# Patient Record
Sex: MALE | Race: OTHER | Employment: FULL TIME | URBAN - METROPOLITAN AREA
[De-identification: names, ages, dates, MRNs, and addresses within clinical notes are randomized per-mention and may not be internally consistent; named-entity substitution may affect disease eponyms.]

---

## 2017-11-05 ENCOUNTER — HOSPITAL ENCOUNTER (INPATIENT)
Age: 52
LOS: 13 days | Discharge: HOME HEALTH | DRG: 720 | End: 2017-11-18
Attending: EMERGENCY MEDICINE | Admitting: INTERNAL MEDICINE
Payer: MEDICAID

## 2017-11-05 ENCOUNTER — APPOINTMENT (OUTPATIENT)
Dept: CT IMAGING | Age: 52
DRG: 720 | End: 2017-11-05
Payer: MEDICAID

## 2017-11-05 DIAGNOSIS — E11.10 TYPE 2 DIABETES MELLITUS WITH KETOACIDOSIS WITHOUT COMA, UNSPECIFIED LONG TERM INSULIN USE STATUS: ICD-10-CM

## 2017-11-05 DIAGNOSIS — A41.9 SEPSIS, DUE TO UNSPECIFIED ORGANISM: ICD-10-CM

## 2017-11-05 DIAGNOSIS — A49.01 MSSA (METHICILLIN SUSCEPTIBLE STAPHYLOCOCCUS AUREUS) INFECTION: ICD-10-CM

## 2017-11-05 DIAGNOSIS — N10 ACUTE PYELONEPHRITIS: Primary | ICD-10-CM

## 2017-11-05 LAB
ALBUMIN SERPL-MCNC: 2.6 G/DL (ref 3.9–4.9)
ALP BLD-CCNC: 269 U/L (ref 35–104)
ALT SERPL-CCNC: 27 U/L (ref 0–41)
ANION GAP SERPL CALCULATED.3IONS-SCNC: 14 MEQ/L (ref 7–13)
ANION GAP SERPL CALCULATED.3IONS-SCNC: 16 MEQ/L (ref 7–13)
ANISOCYTOSIS: ABNORMAL
AST SERPL-CCNC: 14 U/L (ref 0–40)
BACTERIA: ABNORMAL /HPF
BANDED NEUTROPHILS RELATIVE PERCENT: 16 %
BASOPHILS ABSOLUTE: 0.2 K/UL (ref 0–0.2)
BASOPHILS RELATIVE PERCENT: 1 %
BETA-HYDROXYBUTYRATE: 6.8 MG/DL (ref 0.2–2.8)
BILIRUB SERPL-MCNC: 0.9 MG/DL (ref 0–1.2)
BILIRUBIN URINE: NEGATIVE
BLOOD, URINE: ABNORMAL
BUN BLDV-MCNC: 19 MG/DL (ref 6–20)
BUN BLDV-MCNC: 22 MG/DL (ref 6–20)
CALCIUM SERPL-MCNC: 7.6 MG/DL (ref 8.6–10.2)
CALCIUM SERPL-MCNC: 8 MG/DL (ref 8.6–10.2)
CHLORIDE BLD-SCNC: 85 MEQ/L (ref 98–107)
CHLORIDE BLD-SCNC: 93 MEQ/L (ref 98–107)
CHP ED QC CHECK: YES
CLARITY: ABNORMAL
CO2: 25 MEQ/L (ref 22–29)
CO2: 26 MEQ/L (ref 22–29)
COLOR: ABNORMAL
CREAT SERPL-MCNC: 1.12 MG/DL (ref 0.7–1.2)
CREAT SERPL-MCNC: 1.24 MG/DL (ref 0.7–1.2)
EOSINOPHILS ABSOLUTE: 0 K/UL (ref 0–0.7)
EOSINOPHILS RELATIVE PERCENT: 0.2 %
EPITHELIAL CELLS, UA: ABNORMAL /HPF
GFR AFRICAN AMERICAN: >60
GFR AFRICAN AMERICAN: >60
GFR NON-AFRICAN AMERICAN: >60
GFR NON-AFRICAN AMERICAN: >60
GLOBULIN: 3.7 G/DL (ref 2.3–3.5)
GLUCOSE BLD-MCNC: 163 MG/DL (ref 60–115)
GLUCOSE BLD-MCNC: 163 MG/DL (ref 74–109)
GLUCOSE BLD-MCNC: 169 MG/DL (ref 60–115)
GLUCOSE BLD-MCNC: 186 MG/DL (ref 60–115)
GLUCOSE BLD-MCNC: 190 MG/DL (ref 60–115)
GLUCOSE BLD-MCNC: 208 MG/DL (ref 60–115)
GLUCOSE BLD-MCNC: 361 MG/DL (ref 60–115)
GLUCOSE BLD-MCNC: 380 MG/DL
GLUCOSE BLD-MCNC: 380 MG/DL (ref 60–115)
GLUCOSE BLD-MCNC: 522 MG/DL (ref 74–109)
GLUCOSE URINE: >=1000 MG/DL
HBA1C MFR BLD: 11.9 % (ref 4.8–5.9)
HCT VFR BLD CALC: 33.4 % (ref 42–52)
HEMOGLOBIN: 11.5 G/DL (ref 14–18)
HYPOCHROMIA: 0
KETONES, URINE: ABNORMAL MG/DL
LACTIC ACID: 1.5 MMOL/L (ref 0.5–2.2)
LACTIC ACID: 2.9 MMOL/L (ref 0.5–2.2)
LEUKOCYTE ESTERASE, URINE: ABNORMAL
LYMPHOCYTES ABSOLUTE: 0.5 K/UL (ref 1–4.8)
LYMPHOCYTES RELATIVE PERCENT: 2 %
MACROCYTES: 0
MAGNESIUM: 2.2 MG/DL (ref 1.7–2.3)
MCH RBC QN AUTO: 28.3 PG (ref 27–31.3)
MCHC RBC AUTO-ENTMCNC: 34.4 % (ref 33–37)
MCV RBC AUTO: 82.3 FL (ref 80–100)
METAMYELOCYTES RELATIVE PERCENT: 1 %
MICROCYTES: 0
MONOCYTES ABSOLUTE: 0.7 K/UL (ref 0.2–0.8)
MONOCYTES RELATIVE PERCENT: 2.6 %
NEUTROPHILS ABSOLUTE: 21.7 K/UL (ref 1.4–6.5)
NEUTROPHILS RELATIVE PERCENT: 78 %
NITRITE, URINE: POSITIVE
OSMOLALITY: 283 MOSM/KG (ref 280–303)
PDW BLD-RTO: 12.4 % (ref 11.5–14.5)
PERFORMED ON: ABNORMAL
PH UA: 6 (ref 5–9)
PHOSPHORUS: 2.6 MG/DL (ref 2.5–4.5)
PLATELET # BLD: 271 K/UL (ref 130–400)
PLATELET SLIDE REVIEW: ADEQUATE
POIKILOCYTES: 0
POLYCHROMASIA: 0
POTASSIUM SERPL-SCNC: 3.8 MEQ/L (ref 3.5–5.1)
POTASSIUM SERPL-SCNC: 4.8 MEQ/L (ref 3.5–5.1)
PROTEIN UA: 30 MG/DL
RBC # BLD: 4.06 M/UL (ref 4.7–6.1)
RBC UA: ABNORMAL /HPF (ref 0–2)
SLIDE REVIEW: ABNORMAL
SMUDGE CELLS: 1.8
SODIUM BLD-SCNC: 125 MEQ/L (ref 132–144)
SODIUM BLD-SCNC: 134 MEQ/L (ref 132–144)
SPECIFIC GRAVITY UA: 1.03 (ref 1–1.03)
TOTAL PROTEIN: 6.3 G/DL (ref 6.4–8.1)
URINE REFLEX TO CULTURE: YES
UROBILINOGEN, URINE: 0.2 E.U./DL
VACUOLATED NEUTROPHILS: ABNORMAL
WBC # BLD: 22.8 K/UL (ref 4.8–10.8)
WBC UA: >100 /HPF (ref 0–5)

## 2017-11-05 PROCEDURE — 6370000000 HC RX 637 (ALT 250 FOR IP): Performed by: INTERNAL MEDICINE

## 2017-11-05 PROCEDURE — 36415 COLL VENOUS BLD VENIPUNCTURE: CPT

## 2017-11-05 PROCEDURE — 74176 CT ABD & PELVIS W/O CONTRAST: CPT

## 2017-11-05 PROCEDURE — 83036 HEMOGLOBIN GLYCOSYLATED A1C: CPT

## 2017-11-05 PROCEDURE — 83605 ASSAY OF LACTIC ACID: CPT

## 2017-11-05 PROCEDURE — 82010 KETONE BODYS QUAN: CPT

## 2017-11-05 PROCEDURE — 2580000003 HC RX 258: Performed by: EMERGENCY MEDICINE

## 2017-11-05 PROCEDURE — 87149 DNA/RNA DIRECT PROBE: CPT

## 2017-11-05 PROCEDURE — 87147 CULTURE TYPE IMMUNOLOGIC: CPT

## 2017-11-05 PROCEDURE — 2000000000 HC ICU R&B

## 2017-11-05 PROCEDURE — 87186 SC STD MICRODIL/AGAR DIL: CPT

## 2017-11-05 PROCEDURE — 6360000002 HC RX W HCPCS: Performed by: INTERNAL MEDICINE

## 2017-11-05 PROCEDURE — 81001 URINALYSIS AUTO W/SCOPE: CPT

## 2017-11-05 PROCEDURE — 2580000003 HC RX 258: Performed by: INTERNAL MEDICINE

## 2017-11-05 PROCEDURE — 6370000000 HC RX 637 (ALT 250 FOR IP): Performed by: EMERGENCY MEDICINE

## 2017-11-05 PROCEDURE — 96375 TX/PRO/DX INJ NEW DRUG ADDON: CPT

## 2017-11-05 PROCEDURE — 87040 BLOOD CULTURE FOR BACTERIA: CPT

## 2017-11-05 PROCEDURE — 87077 CULTURE AEROBIC IDENTIFY: CPT

## 2017-11-05 PROCEDURE — 6360000002 HC RX W HCPCS: Performed by: EMERGENCY MEDICINE

## 2017-11-05 PROCEDURE — 83930 ASSAY OF BLOOD OSMOLALITY: CPT

## 2017-11-05 PROCEDURE — 96365 THER/PROPH/DIAG IV INF INIT: CPT

## 2017-11-05 PROCEDURE — 83735 ASSAY OF MAGNESIUM: CPT

## 2017-11-05 PROCEDURE — 96366 THER/PROPH/DIAG IV INF ADDON: CPT

## 2017-11-05 PROCEDURE — 99284 EMERGENCY DEPT VISIT MOD MDM: CPT

## 2017-11-05 PROCEDURE — 85025 COMPLETE CBC W/AUTO DIFF WBC: CPT

## 2017-11-05 PROCEDURE — 84100 ASSAY OF PHOSPHORUS: CPT

## 2017-11-05 PROCEDURE — 87086 URINE CULTURE/COLONY COUNT: CPT

## 2017-11-05 PROCEDURE — 80053 COMPREHEN METABOLIC PANEL: CPT

## 2017-11-05 RX ORDER — CIPROFLOXACIN 2 MG/ML
400 INJECTION, SOLUTION INTRAVENOUS ONCE
Status: COMPLETED | OUTPATIENT
Start: 2017-11-05 | End: 2017-11-05

## 2017-11-05 RX ORDER — INSULIN GLARGINE 100 [IU]/ML
50 INJECTION, SOLUTION SUBCUTANEOUS NIGHTLY
Status: DISCONTINUED | OUTPATIENT
Start: 2017-11-05 | End: 2017-11-07

## 2017-11-05 RX ORDER — DEXTROSE AND SODIUM CHLORIDE 5; .45 G/100ML; G/100ML
INJECTION, SOLUTION INTRAVENOUS CONTINUOUS PRN
Status: DISCONTINUED | OUTPATIENT
Start: 2017-11-05 | End: 2017-11-18 | Stop reason: HOSPADM

## 2017-11-05 RX ORDER — DEXTROSE MONOHYDRATE 25 G/50ML
12.5 INJECTION, SOLUTION INTRAVENOUS PRN
Status: DISCONTINUED | OUTPATIENT
Start: 2017-11-05 | End: 2017-11-05 | Stop reason: SDUPTHER

## 2017-11-05 RX ORDER — SODIUM CHLORIDE 450 MG/100ML
INJECTION, SOLUTION INTRAVENOUS CONTINUOUS
Status: DISCONTINUED | OUTPATIENT
Start: 2017-11-05 | End: 2017-11-06

## 2017-11-05 RX ORDER — 0.9 % SODIUM CHLORIDE 0.9 %
1000 INTRAVENOUS SOLUTION INTRAVENOUS ONCE
Status: COMPLETED | OUTPATIENT
Start: 2017-11-05 | End: 2017-11-05

## 2017-11-05 RX ORDER — DEXTROSE MONOHYDRATE 50 MG/ML
100 INJECTION, SOLUTION INTRAVENOUS PRN
Status: DISCONTINUED | OUTPATIENT
Start: 2017-11-05 | End: 2017-11-18 | Stop reason: HOSPADM

## 2017-11-05 RX ORDER — ACETAMINOPHEN 500 MG
1000 TABLET ORAL ONCE
Status: COMPLETED | OUTPATIENT
Start: 2017-11-05 | End: 2017-11-05

## 2017-11-05 RX ORDER — NICOTINE POLACRILEX 4 MG
15 LOZENGE BUCCAL PRN
Status: DISCONTINUED | OUTPATIENT
Start: 2017-11-05 | End: 2017-11-18 | Stop reason: HOSPADM

## 2017-11-05 RX ORDER — DEXTROSE MONOHYDRATE 25 G/50ML
12.5 INJECTION, SOLUTION INTRAVENOUS PRN
Status: DISCONTINUED | OUTPATIENT
Start: 2017-11-05 | End: 2017-11-18 | Stop reason: HOSPADM

## 2017-11-05 RX ORDER — TAMSULOSIN HYDROCHLORIDE 0.4 MG/1
0.4 CAPSULE ORAL DAILY
Status: DISCONTINUED | OUTPATIENT
Start: 2017-11-05 | End: 2017-11-18 | Stop reason: HOSPADM

## 2017-11-05 RX ADMIN — SODIUM CHLORIDE 9 UNITS/HR: 9 INJECTION, SOLUTION INTRAVENOUS at 16:40

## 2017-11-05 RX ADMIN — SODIUM CHLORIDE 4 UNITS/HR: 9 INJECTION, SOLUTION INTRAVENOUS at 14:43

## 2017-11-05 RX ADMIN — TAZOBACTAM SODIUM AND PIPERACILLIN SODIUM 3.38 G: 375; 3 INJECTION, SOLUTION INTRAVENOUS at 14:27

## 2017-11-05 RX ADMIN — CIPROFLOXACIN 400 MG: 2 INJECTION, SOLUTION INTRAVENOUS at 12:50

## 2017-11-05 RX ADMIN — SODIUM CHLORIDE 9 UNITS/HR: 9 INJECTION, SOLUTION INTRAVENOUS at 16:11

## 2017-11-05 RX ADMIN — TAZOBACTAM SODIUM AND PIPERACILLIN SODIUM 3.38 G: 375; 3 INJECTION, SOLUTION INTRAVENOUS at 23:44

## 2017-11-05 RX ADMIN — INSULIN HUMAN 10 UNITS: 100 INJECTION, SOLUTION PARENTERAL at 13:19

## 2017-11-05 RX ADMIN — SODIUM CHLORIDE 1000 ML: 9 INJECTION, SOLUTION INTRAVENOUS at 13:18

## 2017-11-05 RX ADMIN — DEXTROSE AND SODIUM CHLORIDE: 5; 450 INJECTION, SOLUTION INTRAVENOUS at 18:24

## 2017-11-05 RX ADMIN — SODIUM CHLORIDE: 4.5 INJECTION, SOLUTION INTRAVENOUS at 16:40

## 2017-11-05 RX ADMIN — TAMSULOSIN HYDROCHLORIDE 0.4 MG: 0.4 CAPSULE ORAL at 16:40

## 2017-11-05 RX ADMIN — SODIUM CHLORIDE 1000 ML: 9 INJECTION, SOLUTION INTRAVENOUS at 12:50

## 2017-11-05 RX ADMIN — ACETAMINOPHEN 1000 MG: 500 TABLET ORAL at 11:34

## 2017-11-05 ASSESSMENT — PAIN SCALES - GENERAL
PAINLEVEL_OUTOF10: 0
PAINLEVEL_OUTOF10: 4
PAINLEVEL_OUTOF10: 7

## 2017-11-05 ASSESSMENT — ENCOUNTER SYMPTOMS
ABDOMINAL PAIN: 0
SHORTNESS OF BREATH: 0
SORE THROAT: 0
CHEST TIGHTNESS: 0
NAUSEA: 0
EYE PAIN: 0
VOMITING: 0

## 2017-11-05 ASSESSMENT — PAIN DESCRIPTION - ORIENTATION: ORIENTATION: LEFT

## 2017-11-05 ASSESSMENT — PAIN DESCRIPTION - FREQUENCY
FREQUENCY: INTERMITTENT
FREQUENCY: CONTINUOUS

## 2017-11-05 ASSESSMENT — PAIN DESCRIPTION - LOCATION: LOCATION: NECK

## 2017-11-05 ASSESSMENT — PAIN DESCRIPTION - DESCRIPTORS
DESCRIPTORS: ACHING
DESCRIPTORS: ACHING;THROBBING

## 2017-11-05 ASSESSMENT — PAIN DESCRIPTION - PAIN TYPE
TYPE: ACUTE PAIN
TYPE: ACUTE PAIN

## 2017-11-05 NOTE — ED PROVIDER NOTES
deficit. Skin: Skin is warm and dry. No rash noted. He is not diaphoretic. Psychiatric: He has a normal mood and affect. His behavior is normal.       DIAGNOSTIC RESULTS     EKG: All EKG's are interpreted by the Emergency Department Physician who either signs or Co-signs this chart in the absence of a cardiologist.        RADIOLOGY:   Non-plain film images such as CT, Ultrasound and MRI are read by the radiologist. Plain radiographic images are visualized and preliminarily interpreted by the emergency physician with the below findings:    CT of the abdomen and pelvis shows swelling of the right kidney with perinephric stranding but no hydronephrosis. There was questionable calcification at the right UVJ/bladder. Interpretation per the Radiologist below, if available at the time of this note:    CT ABDOMEN PELVIS WO IV CONTRAST Additional Contrast? None    (Results Pending)         ED BEDSIDE ULTRASOUND:   Performed by ED Physician - none    LABS:  Labs Reviewed   URINE RT REFLEX TO CULTURE       All other labs were within normal range or not returned as of this dictation. EMERGENCY DEPARTMENT COURSE and DIFFERENTIAL DIAGNOSIS/MDM:   Vitals:    Vitals:    11/05/17 1052   BP: 104/71   Pulse: 123   Resp: 14   Temp: 100.2 °F (37.9 °C)   TempSrc: Oral   SpO2: 95%   Weight: 158 lb (71.7 kg)   Height: 5' 6\" (1.676 m)       Patient presents with hematuria and fever. He was found to have pyelonephritis. CT of the abdomen and pelvis shows pyelonephritis but does not show hydronephrosis or a stone. The patient is septic and also is in DKA. Patient was diagnosed with type 2 diabetes a year ago but never had any treatment he does have a family history of diabetes. Patient was started on IV Cipro and then stepped up to IV Zosyn. He is getting IV fluids and IV insulin. I did discuss this case with urology, Dr. Stephen Cole.   He looked at the CAT scan himself and did not feel that there was a stone obstructing the ureter. He did state that the patient started to get worse he would come in and put a stent in if necessary. Patient's blood pressure is holding steady and patient is feeling much better with the IV fluids.     [facility]    Labs Reviewed   URINE RT REFLEX TO CULTURE - Abnormal; Notable for the following:        Result Value    Color, UA PRITI (*)     Clarity, UA TURBID (*)     Glucose, Ur >=1000 (*)     Ketones, Urine TRACE (*)     Blood, Urine LARGE (*)     Protein, UA 30 (*)     Nitrite, Urine POSITIVE (*)     Leukocyte Esterase, Urine LARGE (*)     All other components within normal limits   MICROSCOPIC URINALYSIS - Abnormal; Notable for the following:     WBC, UA >100 (*)     RBC, UA 20-50 (*)     All other components within normal limits   CBC WITH AUTO DIFFERENTIAL - Abnormal; Notable for the following:     WBC 22.8 (*)     RBC 4.06 (*)     Hemoglobin 11.5 (*)     Hematocrit 33.4 (*)     Neutrophils # 21.7 (*)     Lymphocytes # 0.5 (*)     All other components within normal limits   COMPREHENSIVE METABOLIC PANEL - Abnormal; Notable for the following:     Sodium 125 (*)     Chloride 85 (*)     Anion Gap 14 (*)     Glucose 522 (*)     BUN 22 (*)     CREATININE 1.24 (*)     Calcium 8.0 (*)     Total Protein 6.3 (*)     Alb 2.6 (*)     Alkaline Phosphatase 269 (*)     Globulin 3.7 (*)     All other components within normal limits    Narrative:     CALL  [facility]  [facility] tel. [phone],  Glucose results called to and read back by Dr Santhosh Lance, 11/05/2017 12:57, by  Lisa Palmer - Abnormal; Notable for the following:     Beta-Hydroxybutyrate 6.8 (*)     All other components within normal limits    Narrative:     CALL  [facility]  [facility] tel. [phone],  Glucose results called to and read back by Dr Santhosh Lance, 11/05/2017 12:57, by  Vito Mlies #2   CULTURE BLOOD #1   LACTIC ACID, PLASMA   HEMOGLOBIN A1C   POCT GLUCOSE   POCT GLUCOSE   POCT GLUCOSE       REASSESSMENT     ED Course

## 2017-11-05 NOTE — PROGRESS NOTES
NEED/INDICATION FOR EMERGENT RIGHT DOUBLE J STENT AT THIS TIME  I WILL MONITOR THE PATIENT THROUGH THE DAY  AGREE WITH ZOSYN TO BE STARTED ASAP  NO ANTICOAGULATION  Shira Unger MD

## 2017-11-05 NOTE — PROGRESS NOTES
Patient admitted to ICU bed 6. Admission assessment completed. Dr Holmes Hammans at bedside orders received. 1800 Dr Alejandro Simmons called and orders received.  Patient voices no needs at this time

## 2017-11-06 LAB
ANION GAP SERPL CALCULATED.3IONS-SCNC: 11 MEQ/L (ref 7–13)
ANION GAP SERPL CALCULATED.3IONS-SCNC: 12 MEQ/L (ref 7–13)
BASOPHILS ABSOLUTE: 0 K/UL (ref 0–0.2)
BASOPHILS RELATIVE PERCENT: 0.1 %
BUN BLDV-MCNC: 16 MG/DL (ref 6–20)
BUN BLDV-MCNC: 17 MG/DL (ref 6–20)
CALCIUM SERPL-MCNC: 7.1 MG/DL (ref 8.6–10.2)
CALCIUM SERPL-MCNC: 7.3 MG/DL (ref 8.6–10.2)
CHLORIDE BLD-SCNC: 96 MEQ/L (ref 98–107)
CHLORIDE BLD-SCNC: 96 MEQ/L (ref 98–107)
CHOLESTEROL, TOTAL: 49 MG/DL (ref 0–199)
CO2: 25 MEQ/L (ref 22–29)
CO2: 25 MEQ/L (ref 22–29)
CREAT SERPL-MCNC: 0.99 MG/DL (ref 0.7–1.2)
CREAT SERPL-MCNC: 1.12 MG/DL (ref 0.7–1.2)
EKG ATRIAL RATE: 108 BPM
EKG P AXIS: 33 DEGREES
EKG P-R INTERVAL: 138 MS
EKG Q-T INTERVAL: 376 MS
EKG QRS DURATION: 88 MS
EKG QTC CALCULATION (BAZETT): 503 MS
EKG R AXIS: 34 DEGREES
EKG T AXIS: 40 DEGREES
EKG VENTRICULAR RATE: 108 BPM
EOSINOPHILS ABSOLUTE: 0.1 K/UL (ref 0–0.7)
EOSINOPHILS RELATIVE PERCENT: 0.4 %
GFR AFRICAN AMERICAN: >60
GFR AFRICAN AMERICAN: >60
GFR NON-AFRICAN AMERICAN: >60
GFR NON-AFRICAN AMERICAN: >60
GLUCOSE BLD-MCNC: 159 MG/DL (ref 74–109)
GLUCOSE BLD-MCNC: 163 MG/DL (ref 60–115)
GLUCOSE BLD-MCNC: 163 MG/DL (ref 60–115)
GLUCOSE BLD-MCNC: 165 MG/DL (ref 74–109)
GLUCOSE BLD-MCNC: 166 MG/DL (ref 60–115)
GLUCOSE BLD-MCNC: 170 MG/DL (ref 60–115)
GLUCOSE BLD-MCNC: 177 MG/DL (ref 60–115)
GLUCOSE BLD-MCNC: 195 MG/DL (ref 60–115)
GLUCOSE BLD-MCNC: 203 MG/DL (ref 60–115)
GLUCOSE BLD-MCNC: 255 MG/DL (ref 60–115)
GLUCOSE BLD-MCNC: 308 MG/DL (ref 60–115)
HCT VFR BLD CALC: 32.8 % (ref 42–52)
HDLC SERPL-MCNC: 6 MG/DL (ref 40–59)
HEMOGLOBIN: 11 G/DL (ref 14–18)
LDL CHOLESTEROL CALCULATED: 25 MG/DL (ref 0–129)
LYMPHOCYTES ABSOLUTE: 0.7 K/UL (ref 1–4.8)
LYMPHOCYTES RELATIVE PERCENT: 3.8 %
MAGNESIUM: 2 MG/DL (ref 1.7–2.3)
MAGNESIUM: 2.3 MG/DL (ref 1.7–2.3)
MCH RBC QN AUTO: 28.2 PG (ref 27–31.3)
MCHC RBC AUTO-ENTMCNC: 33.6 % (ref 33–37)
MCV RBC AUTO: 83.8 FL (ref 80–100)
MONOCYTES ABSOLUTE: 1.2 K/UL (ref 0.2–0.8)
MONOCYTES RELATIVE PERCENT: 6.4 %
NEUTROPHILS ABSOLUTE: 17.1 K/UL (ref 1.4–6.5)
NEUTROPHILS RELATIVE PERCENT: 89.3 %
PDW BLD-RTO: 12.8 % (ref 11.5–14.5)
PERFORMED ON: ABNORMAL
PHOSPHORUS: 2.2 MG/DL (ref 2.5–4.5)
PHOSPHORUS: 3.3 MG/DL (ref 2.5–4.5)
PLATELET # BLD: 230 K/UL (ref 130–400)
POTASSIUM SERPL-SCNC: 3.5 MEQ/L (ref 3.5–5.1)
POTASSIUM SERPL-SCNC: 4.5 MEQ/L (ref 3.5–5.1)
RBC # BLD: 3.92 M/UL (ref 4.7–6.1)
SODIUM BLD-SCNC: 132 MEQ/L (ref 132–144)
SODIUM BLD-SCNC: 133 MEQ/L (ref 132–144)
TRIGL SERPL-MCNC: 91 MG/DL (ref 0–200)
VANCOMYCIN TROUGH: <4 UG/ML (ref 5–10)
WBC # BLD: 19.1 K/UL (ref 4.8–10.8)

## 2017-11-06 PROCEDURE — 99222 1ST HOSP IP/OBS MODERATE 55: CPT | Performed by: INTERNAL MEDICINE

## 2017-11-06 PROCEDURE — 6370000000 HC RX 637 (ALT 250 FOR IP): Performed by: INTERNAL MEDICINE

## 2017-11-06 PROCEDURE — 83735 ASSAY OF MAGNESIUM: CPT

## 2017-11-06 PROCEDURE — 85025 COMPLETE CBC W/AUTO DIFF WBC: CPT

## 2017-11-06 PROCEDURE — 2500000003 HC RX 250 WO HCPCS: Performed by: INTERNAL MEDICINE

## 2017-11-06 PROCEDURE — 80202 ASSAY OF VANCOMYCIN: CPT

## 2017-11-06 PROCEDURE — 2580000003 HC RX 258: Performed by: INTERNAL MEDICINE

## 2017-11-06 PROCEDURE — 99221 1ST HOSP IP/OBS SF/LOW 40: CPT | Performed by: UROLOGY

## 2017-11-06 PROCEDURE — 99254 IP/OBS CNSLTJ NEW/EST MOD 60: CPT | Performed by: INTERNAL MEDICINE

## 2017-11-06 PROCEDURE — 93005 ELECTROCARDIOGRAM TRACING: CPT

## 2017-11-06 PROCEDURE — 6360000002 HC RX W HCPCS: Performed by: INTERNAL MEDICINE

## 2017-11-06 PROCEDURE — 80048 BASIC METABOLIC PNL TOTAL CA: CPT

## 2017-11-06 PROCEDURE — 93010 ELECTROCARDIOGRAM REPORT: CPT | Performed by: INTERNAL MEDICINE

## 2017-11-06 PROCEDURE — 84100 ASSAY OF PHOSPHORUS: CPT

## 2017-11-06 PROCEDURE — 99232 SBSQ HOSP IP/OBS MODERATE 35: CPT | Performed by: ANESTHESIOLOGY

## 2017-11-06 PROCEDURE — 36415 COLL VENOUS BLD VENIPUNCTURE: CPT

## 2017-11-06 PROCEDURE — 80061 LIPID PANEL: CPT

## 2017-11-06 PROCEDURE — 2060000000 HC ICU INTERMEDIATE R&B

## 2017-11-06 RX ORDER — CALCIUM CARBONATE 200(500)MG
1000 TABLET,CHEWABLE ORAL 3 TIMES DAILY PRN
Status: DISCONTINUED | OUTPATIENT
Start: 2017-11-06 | End: 2017-11-18 | Stop reason: HOSPADM

## 2017-11-06 RX ORDER — KETOROLAC TROMETHAMINE 30 MG/ML
30 INJECTION, SOLUTION INTRAMUSCULAR; INTRAVENOUS ONCE
Status: COMPLETED | OUTPATIENT
Start: 2017-11-07 | End: 2017-11-07

## 2017-11-06 RX ORDER — ACETAMINOPHEN 325 MG/1
650 TABLET ORAL EVERY 4 HOURS PRN
Status: DISCONTINUED | OUTPATIENT
Start: 2017-11-06 | End: 2017-11-18 | Stop reason: HOSPADM

## 2017-11-06 RX ORDER — SODIUM CHLORIDE 9 MG/ML
INJECTION, SOLUTION INTRAVENOUS CONTINUOUS
Status: DISCONTINUED | OUTPATIENT
Start: 2017-11-06 | End: 2017-11-13

## 2017-11-06 RX ORDER — VANCOMYCIN HYDROCHLORIDE 1 G/200ML
1000 INJECTION, SOLUTION INTRAVENOUS EVERY 12 HOURS
Status: DISCONTINUED | OUTPATIENT
Start: 2017-11-06 | End: 2017-11-06

## 2017-11-06 RX ADMIN — SODIUM CHLORIDE: 9 INJECTION, SOLUTION INTRAVENOUS at 21:31

## 2017-11-06 RX ADMIN — ACETAMINOPHEN 650 MG: 325 TABLET ORAL at 15:05

## 2017-11-06 RX ADMIN — DEXTROSE AND SODIUM CHLORIDE: 5; 450 INJECTION, SOLUTION INTRAVENOUS at 00:36

## 2017-11-06 RX ADMIN — INSULIN LISPRO 15 UNITS: 100 INJECTION, SOLUTION INTRAVENOUS; SUBCUTANEOUS at 07:34

## 2017-11-06 RX ADMIN — DEXTROSE AND SODIUM CHLORIDE: 5; 450 INJECTION, SOLUTION INTRAVENOUS at 07:28

## 2017-11-06 RX ADMIN — VANCOMYCIN HYDROCHLORIDE 1000 MG: 1 INJECTION, SOLUTION INTRAVENOUS at 09:36

## 2017-11-06 RX ADMIN — NAFCILLIN SODIUM 2 G: 2 INJECTION, POWDER, LYOPHILIZED, FOR SOLUTION INTRAMUSCULAR; INTRAVENOUS at 20:02

## 2017-11-06 RX ADMIN — TAMSULOSIN HYDROCHLORIDE 0.4 MG: 0.4 CAPSULE ORAL at 08:19

## 2017-11-06 RX ADMIN — INSULIN LISPRO 15 UNITS: 100 INJECTION, SOLUTION INTRAVENOUS; SUBCUTANEOUS at 11:47

## 2017-11-06 RX ADMIN — ANTACID TABLETS 1000 MG: 500 TABLET, CHEWABLE ORAL at 21:37

## 2017-11-06 RX ADMIN — HYDROMORPHONE HYDROCHLORIDE 1 MG: 1 INJECTION, SOLUTION INTRAMUSCULAR; INTRAVENOUS; SUBCUTANEOUS at 00:36

## 2017-11-06 RX ADMIN — TAZOBACTAM SODIUM AND PIPERACILLIN SODIUM 3.38 G: 375; 3 INJECTION, SOLUTION INTRAVENOUS at 08:19

## 2017-11-06 RX ADMIN — ACETAMINOPHEN 650 MG: 325 TABLET ORAL at 09:36

## 2017-11-06 RX ADMIN — INSULIN GLARGINE 50 UNITS: 100 INJECTION, SOLUTION SUBCUTANEOUS at 22:07

## 2017-11-06 RX ADMIN — SODIUM CHLORIDE: 4.5 INJECTION, SOLUTION INTRAVENOUS at 15:07

## 2017-11-06 ASSESSMENT — PAIN DESCRIPTION - FREQUENCY
FREQUENCY: INTERMITTENT
FREQUENCY: INTERMITTENT

## 2017-11-06 ASSESSMENT — PAIN SCALES - GENERAL
PAINLEVEL_OUTOF10: 0
PAINLEVEL_OUTOF10: 5
PAINLEVEL_OUTOF10: 2
PAINLEVEL_OUTOF10: 8
PAINLEVEL_OUTOF10: 5
PAINLEVEL_OUTOF10: 3
PAINLEVEL_OUTOF10: 5
PAINLEVEL_OUTOF10: 6
PAINLEVEL_OUTOF10: 0

## 2017-11-06 ASSESSMENT — PAIN DESCRIPTION - PROGRESSION
CLINICAL_PROGRESSION: GRADUALLY IMPROVING
CLINICAL_PROGRESSION: GRADUALLY WORSENING
CLINICAL_PROGRESSION: GRADUALLY IMPROVING
CLINICAL_PROGRESSION: GRADUALLY WORSENING

## 2017-11-06 ASSESSMENT — PAIN DESCRIPTION - DESCRIPTORS
DESCRIPTORS: ACHING;THROBBING
DESCRIPTORS: ACHING

## 2017-11-06 ASSESSMENT — PAIN DESCRIPTION - LOCATION
LOCATION: BACK;NECK
LOCATION: NECK

## 2017-11-06 ASSESSMENT — PAIN DESCRIPTION - ONSET
ONSET: GRADUAL
ONSET: PROGRESSIVE

## 2017-11-06 ASSESSMENT — ENCOUNTER SYMPTOMS
RESPIRATORY NEGATIVE: 1
EYES NEGATIVE: 1
GASTROINTESTINAL NEGATIVE: 1
BACK PAIN: 1

## 2017-11-06 ASSESSMENT — PAIN DESCRIPTION - ORIENTATION: ORIENTATION: LEFT

## 2017-11-06 ASSESSMENT — PAIN DESCRIPTION - PAIN TYPE
TYPE: ACUTE PAIN
TYPE: ACUTE PAIN

## 2017-11-06 NOTE — CONSULTS
Height:         Constitutional: patient is oriented to person, place, and time. patient appears well-developed. patient not in distress. Ears: Adequate hearing  Head: Normocephalic. Atraumatic  Neck: Normal range of motion. No lymphadenopathy  Cardiovascular: Normal rate, BP reviewed. sinus rhythm blood pressure normal no evidence of hypotension patient is afebrile  Pulmonary/Chest: Normal respiratory effort  no shortness of breath  Abdominal: No evidence of abdominal discomfort  Urologic Exam  The patient currently does not have a Quijano catheter  CT scan reviewed. Patient currently on Flomax  Prostate exam deferred. Musculoskeletal:  Ambulatory. Extremities: No edema   Neurological: Cranial nerves intact  Skin: Skin is warm and dry. no rashes  Lymphatics: No lymphadenopathy   Psychiatric: patient has a normal mood and affect. patient's behavior is normal.  Patient fully awake standing talking freely and appropriate in conversation. Assessment  Question of distal right ureteral calculus  No evidence of renal colic at this time  If a stone was there probably it has passed  Plan  We'll continue to monitor patient  No plan on placing double J stent at this time  Greater 50% of 50 minutes spent evaluating patient face to face. Joana Quiroga MD FACS    Please note this report has been partially produced using speech recognition software  And may cause contain errors related to that system including grammar, punctuation and spelling as well as words and phrases that may seem inappropriate. If there are questions or concerns please feel free to contact me to clarify.

## 2017-11-06 NOTE — H&P
patient's allergies indicates no known allergies. Social History:   TOBACCO:   reports that he has never smoked. He has never used smokeless tobacco.  ETOH:   has no alcohol history on file. Lives at home with wife  No travel      Family History:   No family history on file. REVIEW OF SYSTEMS:  Ten systems reviewed and negative except for as above. Physical Exam:    Vitals: /73   Pulse 105   Temp 98.4 °F (36.9 °C)   Resp 21   Ht 5' 6\" (1.676 m)   Wt 158 lb (71.7 kg)   SpO2 96%   BMI 25.50 kg/m²   Constitutional: alert, appears stated age and cooperative  Skin: Skin color, texture, turgor normal. No rashes or lesions  Eyes:Eye: Normal external eye, conjunctiva, PETER. ENT: Head: Normocephalic, no lesions, without obvious abnormality. Neck: no adenopathy, no carotid bruit, no JVD, supple, symmetrical, trachea midline and thyroid not enlarged, symmetric, no tenderness/mass/nodules  Respiratory: clear to auscultation bilaterally  Cardiovascular: regular rate and rhythm, S1, S2 normal, no murmur, click, rub or gallop  Gastrointestinal: soft, non-tender; bowel sounds normal; no masses,  no organomegaly  Genitourinary: Deferred  Musculoskeletal:extremities normal, atraumatic, no cyanosis or edema  Neurologic: Mental status AAOx3 No facial asymmetry or droop. Normal muscle strength b/l. Psychiatric: Appropriate mood and affect.  Good insight and judgement  Hematologic: No obvious bruising or bleeding    Recent Labs      11/05/17   1200   WBC  22.8*   HGB  11.5*   PLT  271     Recent Labs      11/05/17   1200  11/05/17   1430  11/05/17 2008   NA  125*   --   134   K  4.8   --   3.8   CL  85*   --   93*   CO2  26   --   25   BUN  22*   --   19   CREATININE  1.24*   --   1.12   GLUCOSE  522*  380  163*   AST  14   --    --    ALT  27   --    --    BILITOT  0.9   --    --    ALKPHOS  269*   --    --      Lactate: 2.9  URINALYSIS:  Recent Labs      11/05/17   [address]  6.0   WBCUA enlargement. The spleen measures 15 cm in length. No focal splenic or liver lesion. There is no bowel dilatation. Appendix is normal.  There are few diverticula in the sigmoid colon. There is no inflammation within the mesentery. No fluid collection, free fluid, or free air. Left inguinal hernia containing fat. Bones are intact. MILDLY ENLARGED RIGHT KIDNEY WITH RIGHT PERINEPHRIC STRANDING. 2 MM CALCULUS EITHER WITHIN THE RIGHT URETEROVESICAL JUNCTION OR BLADDER. SOFT TISSUE DENSITY AND FAT AT THE POSTERIOR INFERIOR ASPECT OF THE RIGHT KIDNEY, POSSIBLE EXTRAVASATED URINE. THE RIGHT KIDNEY BORDERS ARE ILL-DEFINED, POSSIBLE KIDNEY INFECTION. NO DEFINITE RIGHT-SIDED HYDRONEPHROSIS. 15 MM PLEURAL-BASED NODULE RIGHT LOWER LOBE. RIGHT BASILAR ATELECTASIS. MILD SPLENOMEGALY. LEFT INGUINAL HERNIA CONTAINING FAT. All CT scans at this facility use dose modulation, iterative reconstruction, and/or weight based dosing when appropriate to reduce radiation dose to as low as reasonably achievable. EKG: ordered    Assessment and Plan   1. Sepsis secondary to pyelonephritis with obstructive uropathy: IV Zosyn 3.375 mg every 8 hours. Urine culture cultures and blood cultures. Urology is consulted for possible cystostomy cystoscopy with stenting stone removal.  All anticoagulants have been held for this. Aggressively hydrate both to treat developing DKA as well as sepsis. Patient was started on Zosyn in the emergency department. We also continue with Flomax 0.4 mg daily in order to help with passing of stone. 2. Developing DKA with elevated beta hydroxybutyrate: Patient may be developing diabetic ketoacidosis secondary to inflammatory cytokines from sepsis. We will patient was started on insulin drip the emergency department this will be continued endocrinology will also be consult to the patient has a family history of diabetes but is not a known diabetic.   He does report that he hasn't seen a physician in many years.  Endocrinology will be consulted. Insulin drip D5 1 half normal with 20 of K once glucose reaches less than 250 monitor BMP mag Marlyn every 4 hours until anion gap is closed bicarb greater than 20 insulin drip she'll be continuing diet as started. 3. Hemoglobin A1c 11.9.  4. Lactic acidosis: Likely secondary to diabetes along with developing DKA and pyelonephritis. DVT prophylaxis held secondary to the need of possible urologic intervention. Critical care time including any procedural skills directly evaluating assessing and treating patient requires about 57 minutes.     Mark Guaman MD  Admitting Hospitalist    Emergency Contact:   Contact 1: Name: Lawanda Hall  Contact 1: Number: [phone]  Contact 1: Relationship: wife

## 2017-11-06 NOTE — CONSULTS
[address]                     [address] [facility], [address] [facility]                                 CONSULTATION    PATIENT NAME: Marlin Horton                  :         1965  MED REC NO:   [id number]                            ROOM:       06  ACCOUNT NO:   [de-identified]                           ADMIT DATE:  2017  PROVIDER:     Vance Burger MD    CONSULT DATE:  2017    REASON FOR CONSULTATION:  New onset diabetes, uncontrolled diabetes. CHIEF COMPLAINT AND HISTORY OF PRESENT ILLNESS:  The patient is a  69-year-old male with no prior history of diabetes, admitted with  severe lower back pain with hematuria. Overall condition was  compatible with pyelonephritis with obstructive uropathy leading to  sepsis. The patient's glucose was high at 552 from admission  yesterday, A1c was 11.8, anion gap was slightly elevated at 14, CO2  was normal at 26. The patient was started on IV insulin drip, IV  fluids and IV antibiotics. Initial WBC count was 22.8 thousand. Since that time, the patient has been feeling better. We discontinued  his insulin drip and started him on Lantus and Humalog. He is on  Lantus 50 at night and Humalog 15 at each meals. The patient did  mention about sign and symptoms of diabetes including polyuria,  polydipsia, and some modest weight loss. Denied any blurred vision. Recently moved to this area. He did also mention about some numbness  and tingling of his upper and lower extremities going along with  neuropathy. PAST MEDICAL HISTORY:  Essentially unremarkable. PAST SURGICAL HISTORY:  Hernia repair. FAMILY HISTORY:  Noncontributory. PERSONAL AND SOCIAL HISTORY:  Denies any smoking. ALLERGIES:  None. HOME MEDICATIONS:  Essentially no medications. Meds here include, IV Zosyn, Cipro, Dilaudid, Fluvirin, Lantus 50 at  night, Humalog 15 at each meals, Zosyn, Flomax, vancomycin and IV  fluids.     REVIEW OF

## 2017-11-06 NOTE — PROGRESS NOTES
Critical Care Staff-[facility]  IC06/IC06-01  Narayan Krishna  11/6/2017    ASSESSMENT    Probable UTI with staph bacteremia, improving with IV abx    PLAN    Fluids  IV abx  Urine culture    OK for transfer out of ICU    REASON FOR ICU ADMISSION    48 yo with probable pyelonephritis admitted to the ICU for further management    TODAY'S EVENTS    On exam he states he's feeling much better. He is mildly tachycardic with a good BP and a T max of 38.2. He is growing staph in his blood as sensitivities are not yet back and he is on vancomycin and zosyn. His WBC is slightly better, down to 19 from 23. His Cr has improved and is down to 0.99 from 1.24 as he has received fluids. Exam:    A&Ox3  Blood pressure 119/64, pulse 120, temperature 100.7 °F (38.2 °C), temperature source Oral, resp. rate 16, height 5' 6\" (1.676 m), weight 158 lb (71.7 kg), SpO2 96 %. ST  Adequate air-exchange  Abd soft, NT  Extremities warm        LEVEL II: 1. UTI, pyelonephritis 2. Staph bacteremia 3 Sepsis    Patient seen and examined with the ICU team and RN. I personally participated in the key components.   I have discussed the case and management of the patient's care with the RN and other physicians involved with the care of the patient    Restraints n/a  Sedation interruption n/a  Urinary catheter n/a  Central access n/a    Please see chart for further labs, medications and/or reports    Electronically signed by Omar Bhakta MD on 11/6/2017 at 11:11 AM

## 2017-11-06 NOTE — PROGRESS NOTES
Pharmacy Note  Vancomycin Consult    Matteo Castaneda is a 49 y.o. male started on Vancomycin for urosepsis; consult received from Dr. Espinoza Lepe to manage therapy. Also receiving the following antibiotics: Zosyn 3.375gm IV Q8 hours, per extended infusion protocol. Patient Active Problem List   Diagnosis    Acute pyelonephritis    Sepsis ([facility] [address]       Allergies:  Review of patient's allergies indicates no known allergies.      Temp max: 100.7*F     Recent Labs      11/06/17   0015  11/06/17   0423   BUN  17  16       Recent Labs      11/06/17   0015  11/06/17   0423   CREATININE  1.12  0.99       Recent Labs      11/05/17   1200  11/06/17   0423   WBC  22.8*  19.1*         Intake/Output Summary (Last 24 hours) at 11/06/17 1328  Last data filed at 11/06/17 0853   Gross per 24 hour   Intake 2120 ml   Output 1350 ml   Net 770 ml       Culture Date      Source                       Results  Urine Culture [[id number]] (Abnormal) Collected: 11/05/17 1115   Order Status: Completed Specimen: Urine, clean catch Updated: 11/06/17 0929    Urine Culture, Routine --    Organism Staph aureus MSSA (A)    Urine Culture, Routine --    >100,000 CFU/ml   Sensitivity to follow   PBP2= Negative    Narrative:     ORDER#: [id number]                          ORDERED BY: Demarco Howard  SOURCE: Urine Clean Catch                  COLLECTED:  11/05/17 11:15  [facility] AT [facility]                      RECEIVED :  11/05/17 13:49   Culture Blood #2 [[id number]] (Abnormal) Collected: 11/05/17 1212   Order Status: Completed Specimen: Blood Whole Citrate Updated: 11/06/17 0610    Culture, Blood 2 -- (A)    Gram stain aerobic bottle   Gram positive cocci in clusters-resembling Staph   2 out of 2 blood cultures   Further results to follow    Narrative:     ORDER#: [id number]                          ORDERED BY: Demarco Howard  SOURCE: Blood                              COLLECTED:  11/05/17 12:12  [facility]                      RECEIVED :

## 2017-11-06 NOTE — CONSULTS
Consults   Infectious Disease     Patient Name: Yana Guzman  Date: 11/6/2017  YOB: 1965  Medical Record Number: [id number]    MSSA endocarditis  MSSA pyelonephritis    History of Present Illness:    Presented with urinating blood  Low back pain with radiation to the groin  Blood cultures from admission are growing Staphylococcus urine is showing Staphylococcus aureus MSSA  Significant nausea vomiting  Fevers chills myalgias      CT scan showed right perinephric stranding no hydronephrosis    AMINATION: CT Abdomen and Pelvis without  contrast.       CLINICAL HISTORY: Hematuria for 4 days. ? He did have some right flank pain that went away today. ? He has had some fevers chills and myalgias. ? He took some ibuprofen prior to arrival.       COMPARISONS: None available.       TECHNIQUE: Contiguous axial images were obtained through the abdomen and pelvis without contrast enhancement.  Coronal reformations were made.       FINDINGS: There is right-sided perinephric stranding with soft tissue density in the perirenal fat located posterior and inferior to the right kidney. There is mild dilatation of the right ureter. There is a 2 mm calcification at the expected location of    the right ureterovesical junction or within the bladder. Suspect recent passage of calculus through the right ureter into either the distal ureter or bladder. The border the right kidney is somewhat ill-defined. The right kidney may be infected. Left    kidney is unremarkable. No left-sided hydronephrosis. No nephrolithiasis. Bladder is minimally distended and difficult to evaluate. Calcified phleboliths within the pelvis. There is calcification of the vas deferens. No significant right-sided    hydronephrosis.     No retroperitoneal fluid collection or lymphadenopathy.       No aortic aneurysm. There are minimal atherosclerotic calcifications in the aorta.  Small umbilical hernia containing fat.       There is discoid atelectasis at the right lung base. There is a 15 mm pleural-based nodule posteriorly in the right lower lobe. Left lung base is clear. The portions of liver, pancreas, and adrenal glands visualized have normal morphology. Mild splenic    enlargement. The spleen measures 15 cm in length. No focal splenic or liver lesion.          There is no bowel dilatation. Appendix is normal.  There are few diverticula in the sigmoid colon. There is no inflammation within the mesentery. No fluid collection, free fluid, or free air.  Left inguinal hernia containing fat.       Bones are intact.           Impression   MILDLY ENLARGED RIGHT KIDNEY WITH RIGHT PERINEPHRIC STRANDING. 2 MM CALCULUS EITHER WITHIN THE RIGHT URETEROVESICAL JUNCTION OR BLADDER. SOFT TISSUE DENSITY AND FAT AT THE POSTERIOR INFERIOR ASPECT OF THE RIGHT KIDNEY, POSSIBLE EXTRAVASATED    URINE. THE RIGHT KIDNEY BORDERS ARE ILL-DEFINED, POSSIBLE KIDNEY INFECTION. NO DEFINITE RIGHT-SIDED HYDRONEPHROSIS.     15 MM PLEURAL-BASED NODULE RIGHT LOWER LOBE. RIGHT BASILAR ATELECTASIS.       MILD SPLENOMEGALY.       LEFT INGUINAL HERNIA CONTAINING FAT. Review of Systems   Constitutional: Positive for chills, diaphoresis, fever and malaise/fatigue. Negative for weight loss. HENT: Negative. Eyes: Negative. Respiratory: Negative. Cardiovascular: Negative. Gastrointestinal: Negative. Genitourinary: Positive for dysuria, flank pain, frequency, hematuria and urgency. Musculoskeletal: Positive for back pain and myalgias. Skin: Negative. Neurological: Positive for weakness. Negative for dizziness, tingling, sensory change, seizures and headaches.        Review of Systems: All 14 review of systems negative other than as stated above    Social History   Substance Use Topics    Smoking status: Never Smoker    Smokeless tobacco: Never Used    Alcohol use Not on file         Family medical historyDiabetes hypertension      Past Surgical History:   Procedure Laterality Date    HERNIA REPAIR  1998         No current facility-administered medications on file prior to encounter. No current outpatient prescriptions on file prior to encounter. No Known Allergies          Physical Exam:     Blood pressure 131/72, pulse 102, temperature 98.8 °F (37.1 °C), temperature source Oral, resp. rate 20, height 5' 6\" (1.676 m), weight 158 lb (71.7 kg), SpO2 98 %. Physical Exam   Constitutional: He is oriented to person, place, and time. He appears well-developed. HENT:   Head: Normocephalic. Eyes: Pupils are equal, round, and reactive to light. Neck: No JVD present. No tracheal deviation present. No thyromegaly present. Cardiovascular: Normal rate, normal heart sounds and intact distal pulses. Exam reveals no gallop and no friction rub. No murmur heard. Pulmonary/Chest: Effort normal and breath sounds normal. No respiratory distress. He has no wheezes. He has no rales. He exhibits no tenderness. Abdominal: Soft. Bowel sounds are normal. He exhibits no distension and no mass. There is no tenderness. There is no rebound and no guarding. Musculoskeletal: Normal range of motion. He exhibits no edema or tenderness. Lymphadenopathy:     He has no cervical adenopathy. Neurological: He is alert and oriented to person, place, and time. No cranial nerve deficit. Skin: Skin is warm and dry. No rash noted. No erythema. No pallor.       Lab Results   Component Value Date    WBC 19.1 (H) 11/06/2017    HGB 11.0 (L) 11/06/2017    HCT 32.8 (L) 11/06/2017    MCV 83.8 11/06/2017     11/06/2017     Lab Results   Component Value Date     11/06/2017    K 4.5 11/06/2017    CL 96 11/06/2017    CO2 25 11/06/2017    BUN 16 11/06/2017    CREATININE 0.99 11/06/2017    GLUCOSE 165 11/06/2017    CALCIUM 7.3 11/06/2017              Urine Culture [[id number]] (Abnormal) Collected: 11/05/17 1115   Order Status: Completed Specimen: Urine, clean catch Updated: 11/06/17

## 2017-11-06 NOTE — PROGRESS NOTES
venous distention. Trachea midline. Respiratory:  Normal respiratory effort. Clear to auscultation, bilaterally without Rales/Wheezes/Rhonchi. Cardiovascular: Regular rate and rhythm with normal S1/S2 without murmurs, rubs or gallops. Abdomen: Soft, non-tender, non-distended with normal bowel sounds. Musculoskeletal: No clubbing, cyanosis or edema bilaterally. Full range of motion without deformity. Skin: Skin color, texture, turgor normal.  No rashes or lesions.   Neurologic:  grossly non-focal.  Ext: no c/e/e    Labs:     Recent Results (from the past 24 hour(s))   Urine Reflex to Culture    Collection Time: 11/05/17 11:15 AM   Result Value Ref Range    Color, UA PRITI (A) Straw/Yellow    Clarity, UA TURBID (A) Clear    Glucose, Ur >=1000 (A) Negative mg/dL    Bilirubin Urine Negative Negative    Ketones, Urine TRACE (A) Negative mg/dL    Specific Gravity, UA 1.027 1.005 - 1.030    Blood, Urine LARGE (A) Negative    pH, UA 6.0 5.0 - 9.0    Protein, UA 30 (A) Negative mg/dL    Urobilinogen, Urine 0.2 <2.0 E.U./dL    Nitrite, Urine POSITIVE (A) Negative    Leukocyte Esterase, Urine LARGE (A) Negative    Urine Reflex to Culture YES    Microscopic Urinalysis    Collection Time: 11/05/17 11:15 AM   Result Value Ref Range    WBC, UA >100 (A) 0 - 5 /HPF    RBC, UA 20-50 (A) 0 - 2 /HPF    Epi Cells 0-2 /HPF    Bacteria, UA Many /HPF   CBC Auto Differential    Collection Time: 11/05/17 12:00 PM   Result Value Ref Range    WBC 22.8 (H) 4.8 - 10.8 K/uL    RBC 4.06 (L) 4.70 - 6.10 M/uL    Hemoglobin 11.5 (L) 14.0 - 18.0 g/dL    Hematocrit 33.4 (L) 42.0 - 52.0 %    MCV 82.3 80.0 - 100.0 fL    MCH 28.3 27.0 - 31.3 pg    MCHC 34.4 33.0 - 37.0 %    RDW 12.4 11.5 - 14.5 %    Platelets 438 366 - 996 K/uL    PLATELET SLIDE REVIEW Adequate     SLIDE REVIEW see below     Neutrophils % 78.0 %    Lymphocytes % 2.0 %    Monocytes % 2.6 %    Eosinophils % 0.2 %    Basophils % 1.0 %    Neutrophils # 21.7 (H) 1.4 - 6.5 K/uL lugdunensis,Staphylococcus coagulase-negative,  Enterococcus faecalis, Enterococcus faecium, Listeria species,  Streptococcus pneumoniae, Streptococcus pyogenes (Gp A),  Streptococcus agalactiae (Gp B), Streptococcus anginosus gp,  Streptococcus species. METHICILLIN RESISTANCE MARKER:  mec-A  VANCOMYCIN RESISTANCE MARKERS:  van-A  van-B  :     Lactic Acid, Plasma    Collection Time: 11/05/17 12:45 PM   Result Value Ref Range    Lactic Acid 1.5 0.5 - 2.2 mmol/L   POCT Glucose    Collection Time: 11/05/17  2:29 PM   Result Value Ref Range    POC Glucose 380 (H) 60 - 115 mg/dl    Performed on ACCU-CHEK    POCT glucose    Collection Time: 11/05/17  2:30 PM   Result Value Ref Range    Glucose 380 mg/dL    QC OK?  yes    POCT Glucose    Collection Time: 11/05/17  4:09 PM   Result Value Ref Range    POC Glucose 361 (H) 60 - 115 mg/dl    Performed on ACCU-CHEK    POCT Glucose    Collection Time: 11/05/17  6:18 PM   Result Value Ref Range    POC Glucose 186 (H) 60 - 115 mg/dl    Performed on ACCU-CHEK    Hemoglobin A1c    Collection Time: 11/05/17  8:08 PM   Result Value Ref Range    Hemoglobin A1C 11.9 (H) 4.8 - 5.9 %   Basic metabolic panel    Collection Time: 11/05/17  8:08 PM   Result Value Ref Range    Sodium 134 132 - 144 mEq/L    Potassium 3.8 3.5 - 5.1 mEq/L    Chloride 93 (L) 98 - 107 mEq/L    CO2 25 22 - 29 mEq/L    Anion Gap 16 (H) 7 - 13 mEq/L    Glucose 163 (H) 74 - 109 mg/dL    BUN 19 6 - 20 mg/dL    CREATININE 1.12 0.70 - 1.20 mg/dL    GFR Non-African American >60.0 >60    GFR  >60.0 >60    Calcium 7.6 (L) 8.6 - 10.2 mg/dL   Magnesium    Collection Time: 11/05/17  8:08 PM   Result Value Ref Range    Magnesium 2.2 1.7 - 2.3 mg/dL   Phosphorus    Collection Time: 11/05/17  8:08 PM   Result Value Ref Range    Phosphorus 2.6 2.5 - 4.5 mg/dL   Osmolality    Collection Time: 11/05/17  8:08 PM   Result Value Ref Range    Osmolality 283 280 - 303 mOsm/kg   Lactic acid, plasma    Collection Time:

## 2017-11-07 ENCOUNTER — APPOINTMENT (OUTPATIENT)
Dept: CT IMAGING | Age: 52
DRG: 720 | End: 2017-11-07
Payer: MEDICAID

## 2017-11-07 LAB
BASOPHILS ABSOLUTE: 0 K/UL (ref 0–0.2)
BASOPHILS RELATIVE PERCENT: 0.2 %
EOSINOPHILS ABSOLUTE: 0.1 K/UL (ref 0–0.7)
EOSINOPHILS RELATIVE PERCENT: 0.4 %
GLUCOSE BLD-MCNC: 176 MG/DL (ref 60–115)
GLUCOSE BLD-MCNC: 210 MG/DL (ref 60–115)
GLUCOSE BLD-MCNC: 249 MG/DL (ref 60–115)
GLUCOSE BLD-MCNC: 263 MG/DL (ref 60–115)
HCT VFR BLD CALC: 30.6 % (ref 42–52)
HEMOGLOBIN: 10.2 G/DL (ref 14–18)
LV EF: 65 %
LVEF MODALITY: NORMAL
LYMPHOCYTES ABSOLUTE: 1 K/UL (ref 1–4.8)
LYMPHOCYTES RELATIVE PERCENT: 6.7 %
MCH RBC QN AUTO: 27.9 PG (ref 27–31.3)
MCHC RBC AUTO-ENTMCNC: 33.3 % (ref 33–37)
MCV RBC AUTO: 83.8 FL (ref 80–100)
MONOCYTES ABSOLUTE: 0.9 K/UL (ref 0.2–0.8)
MONOCYTES RELATIVE PERCENT: 5.9 %
NEUTROPHILS ABSOLUTE: 12.9 K/UL (ref 1.4–6.5)
NEUTROPHILS RELATIVE PERCENT: 86.8 %
ORGANISM: ABNORMAL
PDW BLD-RTO: 12.8 % (ref 11.5–14.5)
PERFORMED ON: ABNORMAL
PLATELET # BLD: 228 K/UL (ref 130–400)
RBC # BLD: 3.65 M/UL (ref 4.7–6.1)
SEDIMENTATION RATE, ERYTHROCYTE: 101 MM (ref 0–20)
URINE CULTURE, ROUTINE: ABNORMAL
URINE CULTURE, ROUTINE: ABNORMAL
WBC # BLD: 14.9 K/UL (ref 4.8–10.8)

## 2017-11-07 PROCEDURE — 99231 SBSQ HOSP IP/OBS SF/LOW 25: CPT | Performed by: UROLOGY

## 2017-11-07 PROCEDURE — 85652 RBC SED RATE AUTOMATED: CPT

## 2017-11-07 PROCEDURE — 2580000003 HC RX 258: Performed by: INTERNAL MEDICINE

## 2017-11-07 PROCEDURE — 90656 IIV3 VACC NO PRSV 0.5 ML IM: CPT | Performed by: INTERNAL MEDICINE

## 2017-11-07 PROCEDURE — 6370000000 HC RX 637 (ALT 250 FOR IP): Performed by: INTERNAL MEDICINE

## 2017-11-07 PROCEDURE — 99232 SBSQ HOSP IP/OBS MODERATE 35: CPT | Performed by: INTERNAL MEDICINE

## 2017-11-07 PROCEDURE — 6360000002 HC RX W HCPCS: Performed by: INTERNAL MEDICINE

## 2017-11-07 PROCEDURE — 2580000003 HC RX 258: Performed by: EMERGENCY MEDICINE

## 2017-11-07 PROCEDURE — 74176 CT ABD & PELVIS W/O CONTRAST: CPT

## 2017-11-07 PROCEDURE — 87086 URINE CULTURE/COLONY COUNT: CPT

## 2017-11-07 PROCEDURE — 85025 COMPLETE CBC W/AUTO DIFF WBC: CPT

## 2017-11-07 PROCEDURE — 87186 SC STD MICRODIL/AGAR DIL: CPT

## 2017-11-07 PROCEDURE — 87077 CULTURE AEROBIC IDENTIFY: CPT

## 2017-11-07 PROCEDURE — 36415 COLL VENOUS BLD VENIPUNCTURE: CPT

## 2017-11-07 PROCEDURE — 87040 BLOOD CULTURE FOR BACTERIA: CPT

## 2017-11-07 PROCEDURE — 93306 TTE W/DOPPLER COMPLETE: CPT

## 2017-11-07 PROCEDURE — G0008 ADMIN INFLUENZA VIRUS VAC: HCPCS | Performed by: INTERNAL MEDICINE

## 2017-11-07 PROCEDURE — 2500000003 HC RX 250 WO HCPCS: Performed by: INTERNAL MEDICINE

## 2017-11-07 PROCEDURE — 2060000000 HC ICU INTERMEDIATE R&B

## 2017-11-07 PROCEDURE — 84681 ASSAY OF C-PEPTIDE: CPT

## 2017-11-07 PROCEDURE — 87147 CULTURE TYPE IMMUNOLOGIC: CPT

## 2017-11-07 RX ORDER — INSULIN GLARGINE 100 [IU]/ML
55 INJECTION, SOLUTION SUBCUTANEOUS NIGHTLY
Status: DISCONTINUED | OUTPATIENT
Start: 2017-11-07 | End: 2017-11-08

## 2017-11-07 RX ORDER — PANTOPRAZOLE SODIUM 40 MG/1
40 TABLET, DELAYED RELEASE ORAL
Status: DISCONTINUED | OUTPATIENT
Start: 2017-11-07 | End: 2017-11-16

## 2017-11-07 RX ORDER — KETOROLAC TROMETHAMINE 30 MG/ML
30 INJECTION, SOLUTION INTRAMUSCULAR; INTRAVENOUS ONCE
Status: COMPLETED | OUTPATIENT
Start: 2017-11-07 | End: 2017-11-08

## 2017-11-07 RX ADMIN — DEXTROSE MONOHYDRATE 100 ML/HR: 5 INJECTION INTRAVENOUS at 15:30

## 2017-11-07 RX ADMIN — ACETAMINOPHEN 650 MG: 325 TABLET ORAL at 22:03

## 2017-11-07 RX ADMIN — INSULIN LISPRO 15 UNITS: 100 INJECTION, SOLUTION INTRAVENOUS; SUBCUTANEOUS at 12:09

## 2017-11-07 RX ADMIN — INSULIN GLARGINE 55 UNITS: 100 INJECTION, SOLUTION SUBCUTANEOUS at 21:09

## 2017-11-07 RX ADMIN — NAFCILLIN SODIUM 2 G: 2 INJECTION, POWDER, LYOPHILIZED, FOR SOLUTION INTRAMUSCULAR; INTRAVENOUS at 03:34

## 2017-11-07 RX ADMIN — KETOROLAC TROMETHAMINE 30 MG: 30 INJECTION, SOLUTION INTRAMUSCULAR at 01:07

## 2017-11-07 RX ADMIN — INSULIN LISPRO 15 UNITS: 100 INJECTION, SOLUTION INTRAVENOUS; SUBCUTANEOUS at 08:02

## 2017-11-07 RX ADMIN — TAMSULOSIN HYDROCHLORIDE 0.4 MG: 0.4 CAPSULE ORAL at 07:57

## 2017-11-07 RX ADMIN — NAFCILLIN SODIUM 2 G: 2 INJECTION, POWDER, LYOPHILIZED, FOR SOLUTION INTRAMUSCULAR; INTRAVENOUS at 07:57

## 2017-11-07 RX ADMIN — A/SINGAPORE/GP1908/2015 IVR-180 (AN A/MICHIGAN/45/2015 (H1N1)PDM09-LIKE VIRUS, A/HONG KONG/4801/2014, NYMC X-263B (H3N2) (AN A/HONG KONG/4801/2014-LIKE VIRUS), AND B/BRISBANE/60/2008, WILD TYPE (A B/BRISBANE/60/2008-LIKE VIRUS) 0.5 ML: 15; 15; 15 INJECTION, SUSPENSION INTRAMUSCULAR at 19:01

## 2017-11-07 RX ADMIN — NAFCILLIN SODIUM 2 G: 2 INJECTION, POWDER, LYOPHILIZED, FOR SOLUTION INTRAMUSCULAR; INTRAVENOUS at 21:30

## 2017-11-07 RX ADMIN — DEXTROSE MONOHYDRATE 100 ML/HR: 5 INJECTION INTRAVENOUS at 15:44

## 2017-11-07 RX ADMIN — NAFCILLIN SODIUM 2 G: 2 INJECTION, POWDER, LYOPHILIZED, FOR SOLUTION INTRAMUSCULAR; INTRAVENOUS at 15:30

## 2017-11-07 RX ADMIN — PANTOPRAZOLE SODIUM 40 MG: 40 TABLET, DELAYED RELEASE ORAL at 15:43

## 2017-11-07 RX ADMIN — ANTACID TABLETS 1000 MG: 500 TABLET, CHEWABLE ORAL at 22:03

## 2017-11-07 RX ADMIN — ACETAMINOPHEN 650 MG: 325 TABLET ORAL at 08:40

## 2017-11-07 RX ADMIN — SODIUM CHLORIDE: 9 INJECTION, SOLUTION INTRAVENOUS at 12:16

## 2017-11-07 ASSESSMENT — PAIN DESCRIPTION - ORIENTATION
ORIENTATION: LOWER
ORIENTATION: RIGHT

## 2017-11-07 ASSESSMENT — PAIN DESCRIPTION - PAIN TYPE
TYPE: CHRONIC PAIN
TYPE: ACUTE PAIN
TYPE: ACUTE PAIN

## 2017-11-07 ASSESSMENT — PAIN SCALES - GENERAL
PAINLEVEL_OUTOF10: 2
PAINLEVEL_OUTOF10: 6
PAINLEVEL_OUTOF10: 6
PAINLEVEL_OUTOF10: 5
PAINLEVEL_OUTOF10: 0
PAINLEVEL_OUTOF10: 7
PAINLEVEL_OUTOF10: 9
PAINLEVEL_OUTOF10: 0
PAINLEVEL_OUTOF10: 0

## 2017-11-07 ASSESSMENT — PAIN DESCRIPTION - LOCATION
LOCATION: BACK
LOCATION: BACK;SHOULDER
LOCATION: BACK;NECK;SHOULDER
LOCATION: LEG;BACK

## 2017-11-07 ASSESSMENT — PAIN SCALES - WONG BAKER: WONGBAKER_NUMERICALRESPONSE: 0

## 2017-11-07 NOTE — PROGRESS NOTES
UROLOGY CONSULT Progress Note-Dr Sarmiento    11/7/2017   11:17 AM    Name:  Lauren Ascencio  MRN:    [id number]     Acct:     [de-identified]   Room:  [address] Day: 2     Admit Date: 11/5/2017 10:59 AM  PCP: No primary care provider on file. Subjective:     C/C: Right distal ureteral calculus  Chief Complaint   Patient presents with    Hematuria     started urinating blood 4 days ago, body hurts today, took [id number]  2 hrs ago       Interval History: Status: Patient presented with right distal ureteral calculus, currently asymptomatic, WBC as decreased significantly, patient no longer febrile    History reviewed. No pertinent past medical history. ROS:  ROS    Medications: Allergies: No Known Allergies    Current Meds:   acetaminophen (TYLENOL) tablet 650 mg Q4H PRN   insulin lispro (HUMALOG) injection vial 0-6 Units TID WC   insulin lispro (HUMALOG) injection vial 0-3 Units Nightly   nafcillin 2 g in dextrose 5 % 100 mL IVPB (mini-bag) Q4H   0.9 % sodium chloride infusion Continuous   calcium carbonate (TUMS) chewable tablet 1,000 mg TID PRN   glucose (GLUTOSE) 40 % oral gel 15 g PRN   glucagon (rDNA) injection 1 mg PRN   dextrose 5 % solution PRN   dextrose 50 % solution 12.5 g PRN   dextrose 5 % and 0.45 % sodium chloride infusion Continuous PRN   insulin regular (HUMULIN R;NOVOLIN R) injection 7 Units Once   tamsulosin (FLOMAX) capsule 0.4 mg Daily   influenza type A&B vaccine (FLUVIRIN) injection 0.5 mL Once   insulin glargine (LANTUS) injection vial 50 Units Nightly   insulin lispro (HUMALOG) injection vial 15 Units TID        Data:     Code Status:  Full Code    No family history on file. Social History     Social History    Marital status:      Spouse name: N/A    Number of children: N/A    Years of education: N/A     Occupational History    Not on file.      Social History Main Topics    Smoking status: Never Smoker    Smokeless tobacco: Never Used    Alcohol use Not on file    Drug use: Unknown    Sexual activity: Not on file     Other Topics Concern    Not on file     Social History Narrative    No narrative on file       Vitals:  BP (!) 144/79   Pulse 106   Temp 98.2 °F (36.8 °C) (Oral)   Resp 20   Ht 5' 6\" (1.676 m)   Wt 165 lb 5.5 oz (75 kg)   SpO2 100%   BMI 26.69 kg/m²   Temp (24hrs), Av.6 °F (37 °C), Min:98 °F (36.7 °C), Max:99.7 °F (37.6 °C)    Recent Labs      17   1708  17   2122  17   0611  17   1032   POCGLU  195*  308*  249*  263*       I/O (24Hr): Intake/Output Summary (Last 24 hours) at 17 1117  Last data filed at 17 1028   Gross per 24 hour   Intake              240 ml   Output              700 ml   Net             -460 ml       Labs:  Hematology:  Recent Labs      17   0512   WBC  14.9*   HGB  10.2*   HCT  30.6*   PLT  228   SEDRATE  101*     Chemistry:  Recent Labs      17   0423   NA  133   K  4.5   CL  96*   CO2  25   GLUCOSE  165*   BUN  16   CREATININE  0.99   MG  2.3   ANIONGAP  12   LABGLOM  >60.0   GFRAA  >60.0   CALCIUM  7.3*   PHOS  3.3       Urine Culture   Lab Results   Component Value Date    LABURIN >100,000 CFU/ml  PBP2= Negative   2017         Physical Examination:        Physical Exam patient not in distress, urinalysis clear, no shortness of breath, sinus rhythm, no evidence of flank tenderness    Assessment:         History of recent ureteral calculus on the right with mild hydronephrosis repeat CT scan to make sure that stone is passed and the extravasation/pyelonephritis is resolving  Active Hospital Problems    Diagnosis Date Noted    Acute pyelonephritis [N10]     Sepsis ([address] [A41.9]     Uncontrolled type 2 diabetes mellitus with complication, with long-term current use of insulin (HCC) [E11.8, E11.65, Z79.4]     MSSA (methicillin susceptible Staphylococcus aureus) septicemia ([facility]) [A41.01]     Acute bacterial endocarditis [I33.0]          Plan:        1.  CT today  2.  We'll have further information following CT      Electronically signed by Debra Bruce MD on 11/7/2017 at 11:17 AM

## 2017-11-07 NOTE — CARE COORDINATION
AT THIS TIME, PLAN REMAINS HOME WITH SPOUSE. REPEAT BLOOD CULTURES PENDING.  MAY NEED POSSIBLE FILI. NEED PLAN FROM ID. CURRENTLY RECEIVING NAFCILLIN 2 GM Q 4H. IF LTA WILL NEED PICC. CARE COORDINATION TO FOLLOW.   Electronically signed by Waleska Julian RN on 11/7/17 at 1:52 PM

## 2017-11-07 NOTE — PROGRESS NOTES
MSSA endocarditis  MSSA pyelonephritis     History of Present Illness:     Presented with urinating blood  Low back pain with radiation to the groin  Blood cultures from admission are growing Staphylococcus urine is showing Staphylococcus aureus MSSA  Significant nausea vomiting  Fevers chills myalgias        CT scan showed right perinephric stranding no hydronephrosis     2-D echo of the heart does not show any vegetation               Review of Systems   Constitutional: Positive for chills, diaphoresis, fever and malaise/fatigue. Negative for weight loss. HENT: Negative. Eyes: Negative. Respiratory: Negative. Cardiovascular: Negative. Gastrointestinal: Negative. Genitourinary: Positive for dysuria, flank pain, frequency, hematuria and urgency. Musculoskeletal: Positive for back pain and myalgias. Skin: Negative. Neurological: Positive for weakness. Negative for dizziness, tingling, sensory change, seizures and headaches.         Family medical historyDiabetes hypertension   No Known Allergies     /78   Pulse 106   Temp 97.9 °F (36.6 °C) (Oral)   Resp 19   Ht 5' 6\" (1.676 m)   Wt 165 lb 5.5 oz (75 kg)   SpO2 98%   BMI 26.69 kg/m²     Head: Normocephalic. Eyes: Pupils are equal, round, and reactive to light. Neck: No JVD present. No tracheal deviation present. No thyromegaly present. Cardiovascular: Normal rate, normal heart sounds and intact distal pulses. Exam reveals no gallop and no friction rub. No murmur heard. Pulmonary/Chest: Effort normal and breath sounds normal. No respiratory distress. He has no wheezes. He has no rales. He exhibits no tenderness. Abdominal: Soft. Bowel sounds are normal. He exhibits no distension and no mass. There is no tenderness. There is no rebound and no guarding. Musculoskeletal: Normal range of motion. He exhibits no edema or tenderness. Lymphadenopathy:     He has no cervical adenopathy.    Neurological: He is alert and

## 2017-11-08 ENCOUNTER — APPOINTMENT (OUTPATIENT)
Dept: GENERAL RADIOLOGY | Age: 52
DRG: 720 | End: 2017-11-08
Payer: MEDICAID

## 2017-11-08 ENCOUNTER — ANESTHESIA EVENT (OUTPATIENT)
Dept: OPERATING ROOM | Age: 52
DRG: 720 | End: 2017-11-08
Payer: MEDICAID

## 2017-11-08 ENCOUNTER — ANESTHESIA (OUTPATIENT)
Dept: OPERATING ROOM | Age: 52
DRG: 720 | End: 2017-11-08
Payer: MEDICAID

## 2017-11-08 VITALS — SYSTOLIC BLOOD PRESSURE: 91 MMHG | TEMPERATURE: 97.9 F | OXYGEN SATURATION: 99 % | DIASTOLIC BLOOD PRESSURE: 53 MMHG

## 2017-11-08 LAB
ANION GAP SERPL CALCULATED.3IONS-SCNC: 11 MEQ/L (ref 7–13)
BASOPHILS ABSOLUTE: 0.1 K/UL (ref 0–0.2)
BASOPHILS RELATIVE PERCENT: 1 %
BLOOD CULTURE, ROUTINE: ABNORMAL
BUN BLDV-MCNC: 16 MG/DL (ref 6–20)
CALCIUM SERPL-MCNC: 6.8 MG/DL (ref 8.6–10.2)
CHLORIDE BLD-SCNC: 98 MEQ/L (ref 98–107)
CO2: 24 MEQ/L (ref 22–29)
CREAT SERPL-MCNC: 0.8 MG/DL (ref 0.7–1.2)
CULTURE, BLOOD 2: ABNORMAL
CULTURE, BLOOD 2: ABNORMAL
EOSINOPHILS ABSOLUTE: 0.2 K/UL (ref 0–0.7)
EOSINOPHILS RELATIVE PERCENT: 1.4 %
GFR AFRICAN AMERICAN: >60
GFR NON-AFRICAN AMERICAN: >60
GLUCOSE BLD-MCNC: 110 MG/DL (ref 60–115)
GLUCOSE BLD-MCNC: 181 MG/DL (ref 74–109)
GLUCOSE BLD-MCNC: 186 MG/DL (ref 60–115)
GLUCOSE BLD-MCNC: 195 MG/DL (ref 60–115)
GLUCOSE BLD-MCNC: 217 MG/DL (ref 60–115)
GLUCOSE BLD-MCNC: 235 MG/DL (ref 60–115)
HCT VFR BLD CALC: 29 % (ref 42–52)
HEMOGLOBIN: 9.8 G/DL (ref 14–18)
LYMPHOCYTES ABSOLUTE: 1.1 K/UL (ref 1–4.8)
LYMPHOCYTES RELATIVE PERCENT: 9.3 %
MCH RBC QN AUTO: 28.5 PG (ref 27–31.3)
MCHC RBC AUTO-ENTMCNC: 33.8 % (ref 33–37)
MCV RBC AUTO: 84.5 FL (ref 80–100)
MONOCYTES ABSOLUTE: 0.9 K/UL (ref 0.2–0.8)
MONOCYTES RELATIVE PERCENT: 7.2 %
NEUTROPHILS ABSOLUTE: 9.7 K/UL (ref 1.4–6.5)
NEUTROPHILS RELATIVE PERCENT: 81.1 %
ORGANISM: ABNORMAL
PDW BLD-RTO: 13 % (ref 11.5–14.5)
PERFORMED ON: ABNORMAL
PERFORMED ON: NORMAL
PLATELET # BLD: 254 K/UL (ref 130–400)
POTASSIUM SERPL-SCNC: 3.7 MEQ/L (ref 3.5–5.1)
RBC # BLD: 3.44 M/UL (ref 4.7–6.1)
SODIUM BLD-SCNC: 133 MEQ/L (ref 132–144)
WBC # BLD: 12 K/UL (ref 4.8–10.8)

## 2017-11-08 PROCEDURE — 6370000000 HC RX 637 (ALT 250 FOR IP): Performed by: INTERNAL MEDICINE

## 2017-11-08 PROCEDURE — C1773 RET DEV, INSERTABLE: HCPCS | Performed by: UROLOGY

## 2017-11-08 PROCEDURE — 2580000003 HC RX 258: Performed by: UROLOGY

## 2017-11-08 PROCEDURE — 0T768DZ DILATION OF RIGHT URETER WITH INTRALUMINAL DEVICE, VIA NATURAL OR ARTIFICIAL OPENING ENDOSCOPIC: ICD-10-PCS | Performed by: UROLOGY

## 2017-11-08 PROCEDURE — 36415 COLL VENOUS BLD VENIPUNCTURE: CPT

## 2017-11-08 PROCEDURE — 2500000003 HC RX 250 WO HCPCS: Performed by: INTERNAL MEDICINE

## 2017-11-08 PROCEDURE — 85025 COMPLETE CBC W/AUTO DIFF WBC: CPT

## 2017-11-08 PROCEDURE — BT1D0ZZ FLUOROSCOPY OF RIGHT KIDNEY, URETER AND BLADDER USING HIGH OSMOLAR CONTRAST: ICD-10-PCS | Performed by: UROLOGY

## 2017-11-08 PROCEDURE — C1894 INTRO/SHEATH, NON-LASER: HCPCS | Performed by: UROLOGY

## 2017-11-08 PROCEDURE — 87040 BLOOD CULTURE FOR BACTERIA: CPT

## 2017-11-08 PROCEDURE — 6360000002 HC RX W HCPCS: Performed by: NURSE ANESTHETIST, CERTIFIED REGISTERED

## 2017-11-08 PROCEDURE — 6360000002 HC RX W HCPCS: Performed by: INTERNAL MEDICINE

## 2017-11-08 PROCEDURE — 52330 CYSTOSCOPY AND TREATMENT: CPT | Performed by: UROLOGY

## 2017-11-08 PROCEDURE — 7100000000 HC PACU RECOVERY - FIRST 15 MIN: Performed by: UROLOGY

## 2017-11-08 PROCEDURE — 6370000000 HC RX 637 (ALT 250 FOR IP): Performed by: UROLOGY

## 2017-11-08 PROCEDURE — C1726 CATH, BAL DIL, NON-VASCULAR: HCPCS | Performed by: UROLOGY

## 2017-11-08 PROCEDURE — 3700000001 HC ADD 15 MINUTES (ANESTHESIA): Performed by: UROLOGY

## 2017-11-08 PROCEDURE — B246ZZ4 ULTRASONOGRAPHY OF RIGHT AND LEFT HEART, TRANSESOPHAGEAL: ICD-10-PCS | Performed by: UROLOGY

## 2017-11-08 PROCEDURE — 3600000004 HC SURGERY LEVEL 4 BASE: Performed by: UROLOGY

## 2017-11-08 PROCEDURE — 2580000003 HC RX 258: Performed by: INTERNAL MEDICINE

## 2017-11-08 PROCEDURE — 02HV33Z INSERTION OF INFUSION DEVICE INTO SUPERIOR VENA CAVA, PERCUTANEOUS APPROACH: ICD-10-PCS | Performed by: INTERNAL MEDICINE

## 2017-11-08 PROCEDURE — 2580000003 HC RX 258: Performed by: NURSE ANESTHETIST, CERTIFIED REGISTERED

## 2017-11-08 PROCEDURE — 3600000014 HC SURGERY LEVEL 4 ADDTL 15MIN: Performed by: UROLOGY

## 2017-11-08 PROCEDURE — 87186 SC STD MICRODIL/AGAR DIL: CPT

## 2017-11-08 PROCEDURE — 52332 CYSTOSCOPY AND TREATMENT: CPT | Performed by: UROLOGY

## 2017-11-08 PROCEDURE — 87077 CULTURE AEROBIC IDENTIFY: CPT

## 2017-11-08 PROCEDURE — 80048 BASIC METABOLIC PNL TOTAL CA: CPT

## 2017-11-08 PROCEDURE — 87147 CULTURE TYPE IMMUNOLOGIC: CPT

## 2017-11-08 PROCEDURE — 87086 URINE CULTURE/COLONY COUNT: CPT

## 2017-11-08 PROCEDURE — 2060000000 HC ICU INTERMEDIATE R&B

## 2017-11-08 PROCEDURE — C1758 CATHETER, URETERAL: HCPCS | Performed by: UROLOGY

## 2017-11-08 PROCEDURE — 99232 SBSQ HOSP IP/OBS MODERATE 35: CPT | Performed by: INTERNAL MEDICINE

## 2017-11-08 PROCEDURE — 76000 FLUOROSCOPY <1 HR PHYS/QHP: CPT

## 2017-11-08 PROCEDURE — 6360000004 HC RX CONTRAST MEDICATION: Performed by: UROLOGY

## 2017-11-08 PROCEDURE — 99999 PR OFFICE/OUTPT VISIT,PROCEDURE ONLY: CPT | Performed by: UROLOGY

## 2017-11-08 PROCEDURE — 3700000000 HC ANESTHESIA ATTENDED CARE: Performed by: UROLOGY

## 2017-11-08 PROCEDURE — C2617 STENT, NON-COR, TEM W/O DEL: HCPCS | Performed by: UROLOGY

## 2017-11-08 PROCEDURE — 7100000001 HC PACU RECOVERY - ADDTL 15 MIN: Performed by: UROLOGY

## 2017-11-08 PROCEDURE — 2500000003 HC RX 250 WO HCPCS: Performed by: NURSE ANESTHETIST, CERTIFIED REGISTERED

## 2017-11-08 DEVICE — URETERAL STENT
Type: IMPLANTABLE DEVICE | Site: URETER | Status: FUNCTIONAL
Brand: PERCUFLEX™ PLUS

## 2017-11-08 RX ORDER — MIDAZOLAM HYDROCHLORIDE 1 MG/ML
INJECTION INTRAMUSCULAR; INTRAVENOUS PRN
Status: DISCONTINUED | OUTPATIENT
Start: 2017-11-08 | End: 2017-11-08 | Stop reason: SDUPTHER

## 2017-11-08 RX ORDER — FENTANYL CITRATE 50 UG/ML
INJECTION, SOLUTION INTRAMUSCULAR; INTRAVENOUS PRN
Status: DISCONTINUED | OUTPATIENT
Start: 2017-11-08 | End: 2017-11-08 | Stop reason: SDUPTHER

## 2017-11-08 RX ORDER — HYDROCODONE BITARTRATE AND ACETAMINOPHEN 5; 325 MG/1; MG/1
1 TABLET ORAL PRN
Status: DISCONTINUED | OUTPATIENT
Start: 2017-11-08 | End: 2017-11-08 | Stop reason: HOSPADM

## 2017-11-08 RX ORDER — HYDROCODONE BITARTRATE AND ACETAMINOPHEN 5; 325 MG/1; MG/1
2 TABLET ORAL PRN
Status: DISCONTINUED | OUTPATIENT
Start: 2017-11-08 | End: 2017-11-08 | Stop reason: HOSPADM

## 2017-11-08 RX ORDER — SODIUM CHLORIDE 0.9 % (FLUSH) 0.9 %
10 SYRINGE (ML) INJECTION EVERY 12 HOURS SCHEDULED
Status: DISCONTINUED | OUTPATIENT
Start: 2017-11-08 | End: 2017-11-18 | Stop reason: HOSPADM

## 2017-11-08 RX ORDER — ONDANSETRON 2 MG/ML
INJECTION INTRAMUSCULAR; INTRAVENOUS PRN
Status: DISCONTINUED | OUTPATIENT
Start: 2017-11-08 | End: 2017-11-08 | Stop reason: SDUPTHER

## 2017-11-08 RX ORDER — METOCLOPRAMIDE HYDROCHLORIDE 5 MG/ML
10 INJECTION INTRAMUSCULAR; INTRAVENOUS
Status: DISCONTINUED | OUTPATIENT
Start: 2017-11-08 | End: 2017-11-08 | Stop reason: HOSPADM

## 2017-11-08 RX ORDER — ONDANSETRON 2 MG/ML
4 INJECTION INTRAMUSCULAR; INTRAVENOUS
Status: DISCONTINUED | OUTPATIENT
Start: 2017-11-08 | End: 2017-11-08 | Stop reason: HOSPADM

## 2017-11-08 RX ORDER — ONDANSETRON 2 MG/ML
4 INJECTION INTRAMUSCULAR; INTRAVENOUS EVERY 6 HOURS PRN
Status: DISCONTINUED | OUTPATIENT
Start: 2017-11-08 | End: 2017-11-16

## 2017-11-08 RX ORDER — INSULIN GLARGINE 100 [IU]/ML
40 INJECTION, SOLUTION SUBCUTANEOUS NIGHTLY
Status: DISCONTINUED | OUTPATIENT
Start: 2017-11-08 | End: 2017-11-09

## 2017-11-08 RX ORDER — MAGNESIUM HYDROXIDE 1200 MG/15ML
LIQUID ORAL PRN
Status: DISCONTINUED | OUTPATIENT
Start: 2017-11-08 | End: 2017-11-08 | Stop reason: HOSPADM

## 2017-11-08 RX ORDER — FENTANYL CITRATE 50 UG/ML
50 INJECTION, SOLUTION INTRAMUSCULAR; INTRAVENOUS EVERY 10 MIN PRN
Status: DISCONTINUED | OUTPATIENT
Start: 2017-11-08 | End: 2017-11-08 | Stop reason: HOSPADM

## 2017-11-08 RX ORDER — PROPOFOL 10 MG/ML
INJECTION, EMULSION INTRAVENOUS PRN
Status: DISCONTINUED | OUTPATIENT
Start: 2017-11-08 | End: 2017-11-08 | Stop reason: SDUPTHER

## 2017-11-08 RX ORDER — SODIUM CHLORIDE 0.9 % (FLUSH) 0.9 %
10 SYRINGE (ML) INJECTION PRN
Status: DISCONTINUED | OUTPATIENT
Start: 2017-11-08 | End: 2017-11-18 | Stop reason: HOSPADM

## 2017-11-08 RX ORDER — SODIUM CHLORIDE 9 MG/ML
INJECTION, SOLUTION INTRAVENOUS CONTINUOUS PRN
Status: DISCONTINUED | OUTPATIENT
Start: 2017-11-08 | End: 2017-11-08 | Stop reason: SDUPTHER

## 2017-11-08 RX ORDER — MEPERIDINE HYDROCHLORIDE 25 MG/ML
12.5 INJECTION INTRAMUSCULAR; INTRAVENOUS; SUBCUTANEOUS EVERY 5 MIN PRN
Status: DISCONTINUED | OUTPATIENT
Start: 2017-11-08 | End: 2017-11-08 | Stop reason: HOSPADM

## 2017-11-08 RX ORDER — DIPHENHYDRAMINE HYDROCHLORIDE 50 MG/ML
12.5 INJECTION INTRAMUSCULAR; INTRAVENOUS
Status: DISCONTINUED | OUTPATIENT
Start: 2017-11-08 | End: 2017-11-08 | Stop reason: HOSPADM

## 2017-11-08 RX ORDER — LIDOCAINE HYDROCHLORIDE 20 MG/ML
INJECTION, SOLUTION INFILTRATION; PERINEURAL PRN
Status: DISCONTINUED | OUTPATIENT
Start: 2017-11-08 | End: 2017-11-08 | Stop reason: SDUPTHER

## 2017-11-08 RX ORDER — DEXAMETHASONE SODIUM PHOSPHATE 4 MG/ML
INJECTION, SOLUTION INTRA-ARTICULAR; INTRALESIONAL; INTRAMUSCULAR; INTRAVENOUS; SOFT TISSUE PRN
Status: DISCONTINUED | OUTPATIENT
Start: 2017-11-08 | End: 2017-11-08 | Stop reason: SDUPTHER

## 2017-11-08 RX ADMIN — ACETAMINOPHEN 650 MG: 325 TABLET ORAL at 16:18

## 2017-11-08 RX ADMIN — KETOROLAC TROMETHAMINE 30 MG: 30 INJECTION, SOLUTION INTRAMUSCULAR at 00:33

## 2017-11-08 RX ADMIN — INSULIN GLARGINE 40 UNITS: 100 INJECTION, SOLUTION SUBCUTANEOUS at 22:18

## 2017-11-08 RX ADMIN — ONDANSETRON 4 MG: 2 INJECTION INTRAMUSCULAR; INTRAVENOUS at 13:30

## 2017-11-08 RX ADMIN — PROPOFOL 10 MG: 10 INJECTION, EMULSION INTRAVENOUS at 13:22

## 2017-11-08 RX ADMIN — ACETAMINOPHEN 650 MG: 325 TABLET ORAL at 02:48

## 2017-11-08 RX ADMIN — PROPOFOL 50 MG: 10 INJECTION, EMULSION INTRAVENOUS at 13:20

## 2017-11-08 RX ADMIN — FENTANYL CITRATE 25 MCG: 50 INJECTION, SOLUTION INTRAMUSCULAR; INTRAVENOUS at 13:28

## 2017-11-08 RX ADMIN — SODIUM CHLORIDE: 9 INJECTION, SOLUTION INTRAVENOUS at 02:44

## 2017-11-08 RX ADMIN — NAFCILLIN SODIUM 2 G: 2 INJECTION, POWDER, LYOPHILIZED, FOR SOLUTION INTRAMUSCULAR; INTRAVENOUS at 09:49

## 2017-11-08 RX ADMIN — Medication 10 ML: at 21:28

## 2017-11-08 RX ADMIN — PROPOFOL 200 MG: 10 INJECTION, EMULSION INTRAVENOUS at 13:18

## 2017-11-08 RX ADMIN — FENTANYL CITRATE 25 MCG: 50 INJECTION, SOLUTION INTRAMUSCULAR; INTRAVENOUS at 13:18

## 2017-11-08 RX ADMIN — NAFCILLIN SODIUM 2 G: 2 INJECTION, POWDER, LYOPHILIZED, FOR SOLUTION INTRAMUSCULAR; INTRAVENOUS at 02:44

## 2017-11-08 RX ADMIN — LIDOCAINE HYDROCHLORIDE 80 MG: 20 INJECTION, SOLUTION INFILTRATION; PERINEURAL at 13:18

## 2017-11-08 RX ADMIN — SODIUM CHLORIDE: 900 INJECTION, SOLUTION INTRAVENOUS at 13:34

## 2017-11-08 RX ADMIN — DEXAMETHASONE SODIUM PHOSPHATE 5 MG: 4 INJECTION INTRA-ARTICULAR; INTRALESIONAL; INTRAMUSCULAR; INTRAVENOUS; SOFT TISSUE at 13:29

## 2017-11-08 RX ADMIN — SODIUM CHLORIDE: 900 INJECTION, SOLUTION INTRAVENOUS at 13:12

## 2017-11-08 RX ADMIN — ACETAMINOPHEN 650 MG: 325 TABLET ORAL at 23:28

## 2017-11-08 RX ADMIN — NAFCILLIN SODIUM 2 G: 2 INJECTION, POWDER, LYOPHILIZED, FOR SOLUTION INTRAMUSCULAR; INTRAVENOUS at 21:28

## 2017-11-08 RX ADMIN — NAFCILLIN SODIUM 2 G: 2 INJECTION, POWDER, LYOPHILIZED, FOR SOLUTION INTRAMUSCULAR; INTRAVENOUS at 14:25

## 2017-11-08 RX ADMIN — PHENYLEPHRINE HYDROCHLORIDE 50 MCG: 10 INJECTION INTRAVENOUS at 13:37

## 2017-11-08 RX ADMIN — PHENYLEPHRINE HYDROCHLORIDE 50 MCG: 10 INJECTION INTRAVENOUS at 13:20

## 2017-11-08 RX ADMIN — MIDAZOLAM HYDROCHLORIDE 2 MG: 1 INJECTION, SOLUTION INTRAMUSCULAR; INTRAVENOUS at 13:12

## 2017-11-08 RX ADMIN — PANTOPRAZOLE SODIUM 40 MG: 40 TABLET, DELAYED RELEASE ORAL at 08:32

## 2017-11-08 RX ADMIN — TAMSULOSIN HYDROCHLORIDE 0.4 MG: 0.4 CAPSULE ORAL at 08:32

## 2017-11-08 ASSESSMENT — PAIN SCALES - GENERAL
PAINLEVEL_OUTOF10: 0
PAINLEVEL_OUTOF10: 2
PAINLEVEL_OUTOF10: 7
PAINLEVEL_OUTOF10: 0
PAINLEVEL_OUTOF10: 4
PAINLEVEL_OUTOF10: 0
PAINLEVEL_OUTOF10: 8
PAINLEVEL_OUTOF10: 5
PAINLEVEL_OUTOF10: 2
PAINLEVEL_OUTOF10: 6

## 2017-11-08 ASSESSMENT — PAIN DESCRIPTION - LOCATION: LOCATION: BACK

## 2017-11-08 ASSESSMENT — PAIN DESCRIPTION - PAIN TYPE: TYPE: ACUTE PAIN

## 2017-11-08 ASSESSMENT — PAIN DESCRIPTION - ORIENTATION: ORIENTATION: LOWER

## 2017-11-08 NOTE — PROGRESS NOTES
Kelli Branham is a 49 y.o. male patient.   Chief complaints DKA/HYPERGLYCEMIA  Current Facility-Administered Medications   Medication Dose Route Frequency Provider Last Rate Last Dose    pantoprazole (PROTONIX) tablet 40 mg  40 mg Oral QAM AC Rafat Gaspar MD   40 mg at 11/07/17 1543    insulin lispro (HUMALOG) injection vial 20 Units  20 Units Subcutaneous TID  Yi Ortiz MD   20 Units at 11/07/17 1653    insulin glargine (LANTUS) injection vial 55 Units  55 Units Subcutaneous Nightly Yi Ortiz MD   55 Units at 11/07/17 2109    nafcillin 2 g in dextrose 5 % 100 mL IVPB  2 g Intravenous Q4H Donavan Hernandez MD   Stopped at 11/07/17 1630    ketorolac (TORADOL) injection 30 mg  30 mg Intravenous Once Sarah Furnace D Sedar, DO        acetaminophen (TYLENOL) tablet 650 mg  650 mg Oral Q4H PRN Yi Ortiz MD   650 mg at 11/07/17 0840    insulin lispro (HUMALOG) injection vial 0-6 Units  0-6 Units Subcutaneous TID  Rafat Simmons MD   2 Units at 11/07/17 1654    insulin lispro (HUMALOG) injection vial 0-3 Units  0-3 Units Subcutaneous Nightly Rafat Gaspar MD   1 Units at 11/07/17 2108    0.9 % sodium chloride infusion   Intravenous Continuous Sarah Furnace D Sedar, DO 75 mL/hr at 11/07/17 1216      calcium carbonate (TUMS) chewable tablet 1,000 mg  1,000 mg Oral TID PRN Georgana Wyano Sedar, DO   1,000 mg at 11/06/17 2137    glucose (GLUTOSE) 40 % oral gel 15 g  15 g Oral PRN Kacie O Portman, DO        glucagon (rDNA) injection 1 mg  1 mg Intramuscular PRN Kacie O Portman, DO        dextrose 5 % solution  100 mL/hr Intravenous PRN Kacie O Portman,  mL/hr at 11/07/17 1544 100 mL/hr at 11/07/17 1544    dextrose 50 % solution 12.5 g  12.5 g Intravenous PRN Sarah Furnace D Sedar, DO        dextrose 5 % and 0.45 % sodium chloride infusion   Intravenous Continuous PRN Georgana Wyano Sedar,  mL/hr at 11/06/17 0728      insulin regular (HUMULIN R;NOVOLIN R) injection 7 Units  0.1 Units/kg Intravenous Once Linette Tse DO  tamsulosin (FLOMAX) capsule 0.4 mg  0.4 mg Oral Daily Meagan De León DO   0.4 mg at 11/07/17 0757     No Known Allergies  Active Problems:    Acute pyelonephritis    Sepsis ([address]    Uncontrolled type 2 diabetes mellitus with complication, with long-term current use of insulin ([facility])    MSSA (methicillin susceptible Staphylococcus aureus) septicemia ([facility])    Acute bacterial endocarditis    Blood pressure 135/81, pulse 99, temperature 98.4 °F (36.9 °C), temperature source Oral, resp. rate 18, height 5' 6\" (1.676 m), weight 164 lb 10.9 oz (74.7 kg), SpO2 100 %. Subjective:  Symptoms:  Improved. He reports malaise and weakness. Diet:  Adequate intake. Activity level: Impaired due to weakness. Pain:  He complains of pain that is mild. He reports pain is improving. Objective:  General Appearance:  Comfortable. Vital signs: (most recent): Blood pressure 135/81, pulse 99, temperature 98.4 °F (36.9 °C), temperature source Oral, resp. rate 18, height 5' 6\" (1.676 m), weight 164 lb 10.9 oz (74.7 kg), SpO2 100 %. Vital signs are normal.    Lungs:  Normal effort and normal respiratory rate. Breath sounds clear to auscultation. Heart: Normal rate. Regular rhythm. S1 normal.    Abdomen: Abdomen is soft. Bowel sounds are normal.     Extremities: Normal range of motion. Neurological: Patient is alert. Skin:  Warm. Logs reviewed   Results for Gato Galeas (MRN [id number]) as of 11/7/2017 21:58   Ref.  Range 11/7/2017 10:32 11/7/2017 13:12 11/7/2017 14:30 11/7/2017 15:40 11/7/2017 20:29   POC Glucose Latest Ref Range: 60 - 115 mg/dl 263 (H)   210 (H) 176 (H)     Lab Results   Component Value Date     11/06/2017    K 4.5 11/06/2017    CL 96 (L) 11/06/2017    CO2 25 11/06/2017    BUN 16 11/06/2017    CREATININE 0.99 11/06/2017    GLUCOSE 165 (H) 11/06/2017    CALCIUM 7.3 (L) 11/06/2017    PROT 6.3 (L) 11/05/2017    LABALBU 2.6 (L) 11/05/2017    BILITOT 0.9 11/05/2017    ALKPHOS 269 (H)

## 2017-11-08 NOTE — PROGRESS NOTES
lispro  0-3 Units Subcutaneous Nightly    insulin regular  0.1 Units/kg Intravenous Once    tamsulosin  0.4 mg Oral Daily     Continuous Infusions:   sodium chloride 75 mL/hr at 11/08/17 0244    dextrose 100 mL/hr (11/07/17 1544)    dextrose 5 % and 0.45 % NaCl 150 mL/hr at 11/06/17 0728       Objective:   Vitals: /75   Pulse 95   Temp 97.9 °F (36.6 °C)   Resp 18   Ht 5' 6\" (1.676 m)   Wt 164 lb 10.9 oz (74.7 kg)   SpO2 99%   BMI 26.58 kg/m²    Wt Readings from Last 3 Encounters:   11/07/17 164 lb 10.9 oz (74.7 kg)        General appearance: alert, appears stated age, cooperative and no distress  Skin: Skin color, texture, turgor normal. No rashes or lesions. Neck: no lymphadenopathy  Lungs: clear to auscultation bilaterally  Heart: regular rate and rhythm, S1, S2 normal, no murmur, click, rub or gallop  Abdomen: soft, non-tender. Bowel sounds normal. No masses,  no organomegaly.   Extremities: extremities normal, atraumatic, no cyanosis or edema    Patient Active Problem List:     Acute pyelonephritis     Sepsis ([address]     Uncontrolled type 2 diabetes mellitus with complication, with long-term current use of insulin ([address]     MSSA (methicillin susceptible Staphylococcus aureus) septicemia ([address]     Acute bacterial endocarditis            Electronically signed by KATHY Hurd on 11/8/2017 at 11:17 AM

## 2017-11-08 NOTE — OP NOTE
verified  by fluoroscopy. The catheter was then followed up into the bladder. Examination of bladder showed no evidence of inflammatory changes. There was no evidence of tumors, stones, and/or other abnormalities. There was no evidence of malignancy. The right ureter could be seen  oozing some murky looking urine. At this point, a right retrograde  pyelogram was obtained which showed questionable filling defect  consistent with a stone; however, the ureteral catheter could not be  advanced due to swelling and/or the stone being impacted in this area. Then, this ureter was flushed and manipulated with the ureteral  catheter and the stone probably moved due to the catheter giving and  moving up into the ureter. At this point, mild hydronephrotic drip  was noted and a urine culture from the right kidney was sent. A  guidewire was placed in the right collecting system. A right  retrograde pyelogram was obtained which showed mild to moderate  hydronephrosis. This does not reflect the hydronephrosis noted on the  previous CT the hydro was less severe. At this point, a guidewire was placed into the right  collecting system and a 6 x 26 double-J stent was placed into the  right collecting system under fluoroscopy with good curl observed in  the renal pelvis and in the bladder. Then, the guidewire was placed  back into the bladder and a 16-[place] tip catheter was placed  over the wire into the bladder under fluoroscopy. The balloon was  noted to be in the bladder and there was good drainage from the  catheter. The catheter will be left in place overnight and  discontinued in the morning. The patient tolerated the procedure  well. There were no complications.         Christiano Levine MD    D: 11/08/2017 13:51:32       T: 11/08/2017 14:15:32     [email or web address]  Job#: [id number]     Doc#: [id number]

## 2017-11-08 NOTE — BRIEF OP NOTE
Brief Postoperative Note    Isabell Pérez  YOB: 1965  [id number]    Pre-operative Diagnosis: right hydronephrosis/right pyelonephritis    Post-operative Diagnosis: Same    Procedure: cystoscopy right retrograde pyelogram, manipulation of calculus, placement of double j stent    Anesthesia: General    Surgeons/Assistants: Jose Miguel Kumar MD      Estimated Blood Loss: less than 50     Complications: None    Specimens: Was Obtained: right renal pelvis/kidney culture    Findings: hydronephrosis    Electronically signed by Jose Miguel Kumar MD on 11/8/2017 at 1:44 PM

## 2017-11-08 NOTE — PROGRESS NOTES
Assumed care of pt. Assessment unchanged from report given. Pt alert and complains of neck and shoulder pain. Will medicated per MAR. No further needs at this time. Family present at the bedside.   INV NS continues to infuse at 75 ml/hr without dificulty

## 2017-11-08 NOTE — ANESTHESIA PRE PROCEDURE
Prema Dragon Sedar, DO        dextrose 5 % and 0.45 % sodium chloride infusion   Intravenous Continuous PRN Prema Dragon Sedar,  mL/hr at 11/06/17 9917      insulin regular (HUMULIN R;NOVOLIN R) injection 7 Units  0.1 Units/kg Intravenous Once Prema Dragon Sedar, DO        tamsulosin (FLOMAX) capsule 0.4 mg  0.4 mg Oral Daily Prema Dragon Sedar, DO   0.4 mg at 11/08/17 1554       Allergies:  No Known Allergies    Problem List:    Patient Active Problem List   Diagnosis Code    Acute pyelonephritis N10    Sepsis ([facility] A41.9    Uncontrolled type 2 diabetes mellitus with complication, with long-term current use of insulin ([facility]) E11.8, E11.65, Z79.4    MSSA (methicillin susceptible Staphylococcus aureus) septicemia ([facility] A41.01    Acute bacterial endocarditis I33.0       Past Medical History:  History reviewed. No pertinent past medical history. Past Surgical History:        Procedure Laterality Date    HERNIA REPAIR  1998       Social History:    Social History   Substance Use Topics    Smoking status: Never Smoker    Smokeless tobacco: Never Used    Alcohol use Not on file                                Counseling given: Not Answered      Vital Signs (Current):   Vitals:    11/07/17 1943 11/08/17 0718 11/08/17 0815 11/08/17 0817   BP:  124/81 123/75 123/75   Pulse: 99 88 89 95   Resp:  17 18 18   Temp:  97.8 °F (36.6 °C) 97.9 °F (36.6 °C) 97.9 °F (36.6 °C)   TempSrc:  Oral Oral    SpO2:  99% 99% 99%   Weight:       Height:                                                  BP Readings from Last 3 Encounters:   11/08/17 123/75       NPO Status: Time of last liquid consumption: 0600 (one oz. water)                        Time of last solid consumption: 0600                        Date of last liquid consumption: 11/08/17                        Date of last solid food consumption: 11/08/17    BMI:   Wt Readings from Last 3 Encounters:   11/07/17 164 lb 10.9 oz (74.7 kg)     Body mass index is 26.58 kg/m².     CBC:   Lab Results   Component Value Date    WBC 12.0 11/08/2017    RBC 3.44 11/08/2017    HGB 9.8 11/08/2017    HCT 29.0 11/08/2017    MCV 84.5 11/08/2017    RDW 13.0 11/08/2017     11/08/2017       CMP:   Lab Results   Component Value Date     11/08/2017    K 3.7 11/08/2017    CL 98 11/08/2017    CO2 24 11/08/2017    BUN 16 11/08/2017    CREATININE 0.80 11/08/2017    GFRAA >60.0 11/08/2017    LABGLOM >60.0 11/08/2017    GLUCOSE 181 11/08/2017    PROT 6.3 11/05/2017    CALCIUM 6.8 11/08/2017    BILITOT 0.9 11/05/2017    ALKPHOS 269 11/05/2017    AST 14 11/05/2017    ALT 27 11/05/2017       POC Tests:   Recent Labs      11/08/17   1051   POCGLU  186*       Coags: No results found for: PROTIME, INR, APTT    HCG (If Applicable): No results found for: PREGTESTUR, PREGSERUM, HCG, HCGQUANT     ABGs: No results found for: PHART, PO2ART, GVD3ZZX, RQU4EKD, BEART, A3TSLVKF     Type & Screen (If Applicable):  No results found for: LABABO, LABRH    Anesthesia Evaluation    Airway: Mallampati: I  TM distance: >3 FB   Neck ROM: full  Mouth opening: > = 3 FB Dental:          Pulmonary:negative ROS breath sounds clear to auscultation                             Cardiovascular:negative ROS          ECG reviewed  Rhythm: regular    Echocardiogram reviewed                  Neuro/Psych:   {neg ROS              GI/Hepatic/Renal:   (+) renal disease: kidney stones,           Endo/Other:    (+) Type II DM, using insulin,                  Abdominal:           Vascular:                                      Anesthesia Plan      general     ASA 2       Induction: intravenous. MIPS: Postoperative opioids intended and Prophylactic antiemetics administered. Anesthetic plan and risks discussed with patient. Use of blood products discussed with patient whom consented to blood products.    Plan discussed with CRNA and surgical team.                  Fred Khoury MD   11/8/2017

## 2017-11-08 NOTE — CARE COORDINATION
2ND SET OF BLOOD CULTURES GROWING STAPH. PATIENT REMAINS ON NAFCILLIN Q 4H. HAVING DOUBLE J STENT PLACED TODAY. STILL NEED PLAN FROM ID.   Electronically signed by Gale Kimble RN on 11/8/17 at 1:00 PM

## 2017-11-09 ENCOUNTER — APPOINTMENT (OUTPATIENT)
Dept: CARDIAC CATH/INVASIVE PROCEDURES | Age: 52
DRG: 720 | End: 2017-11-09
Payer: MEDICAID

## 2017-11-09 LAB
ANION GAP SERPL CALCULATED.3IONS-SCNC: 13 MEQ/L (ref 7–13)
BASOPHILS ABSOLUTE: 0 K/UL (ref 0–0.2)
BASOPHILS RELATIVE PERCENT: 0.4 %
BLOOD CULTURE, ROUTINE: ABNORMAL
BLOOD CULTURE, ROUTINE: ABNORMAL
BUN BLDV-MCNC: 13 MG/DL (ref 6–20)
C-PEPTIDE: 2.2 NG/ML (ref 0.8–3.5)
CALCIUM SERPL-MCNC: 6.8 MG/DL (ref 8.6–10.2)
CHLORIDE BLD-SCNC: 100 MEQ/L (ref 98–107)
CO2: 23 MEQ/L (ref 22–29)
CREAT SERPL-MCNC: 1.02 MG/DL (ref 0.7–1.2)
CULTURE, BLOOD 2: ABNORMAL
CULTURE, BLOOD 2: ABNORMAL
EOSINOPHILS ABSOLUTE: 0.1 K/UL (ref 0–0.7)
EOSINOPHILS RELATIVE PERCENT: 0.7 %
GFR AFRICAN AMERICAN: >60
GFR NON-AFRICAN AMERICAN: >60
GLUCOSE BLD-MCNC: 171 MG/DL (ref 60–115)
GLUCOSE BLD-MCNC: 182 MG/DL (ref 74–109)
GLUCOSE BLD-MCNC: 190 MG/DL (ref 60–115)
GLUCOSE BLD-MCNC: 212 MG/DL (ref 60–115)
GLUCOSE BLD-MCNC: 222 MG/DL (ref 60–115)
HCT VFR BLD CALC: 31.2 % (ref 42–52)
HEMOGLOBIN: 10.5 G/DL (ref 14–18)
LYMPHOCYTES ABSOLUTE: 1.7 K/UL (ref 1–4.8)
LYMPHOCYTES RELATIVE PERCENT: 12.9 %
MCH RBC QN AUTO: 28.6 PG (ref 27–31.3)
MCHC RBC AUTO-ENTMCNC: 33.7 % (ref 33–37)
MCV RBC AUTO: 84.9 FL (ref 80–100)
MONOCYTES ABSOLUTE: 0.8 K/UL (ref 0.2–0.8)
MONOCYTES RELATIVE PERCENT: 6.4 %
NEUTROPHILS ABSOLUTE: 10.3 K/UL (ref 1.4–6.5)
NEUTROPHILS RELATIVE PERCENT: 79.6 %
ORGANISM: ABNORMAL
ORGANISM: ABNORMAL
PDW BLD-RTO: 13 % (ref 11.5–14.5)
PERFORMED ON: ABNORMAL
PLATELET # BLD: 318 K/UL (ref 130–400)
POTASSIUM SERPL-SCNC: 4.2 MEQ/L (ref 3.5–5.1)
RBC # BLD: 3.67 M/UL (ref 4.7–6.1)
SODIUM BLD-SCNC: 136 MEQ/L (ref 132–144)
WBC # BLD: 12.9 K/UL (ref 4.8–10.8)

## 2017-11-09 PROCEDURE — 6370000000 HC RX 637 (ALT 250 FOR IP): Performed by: INTERNAL MEDICINE

## 2017-11-09 PROCEDURE — 93312 ECHO TRANSESOPHAGEAL: CPT

## 2017-11-09 PROCEDURE — 2580000003 HC RX 258: Performed by: INTERNAL MEDICINE

## 2017-11-09 PROCEDURE — 93321 DOPPLER ECHO F-UP/LMTD STD: CPT

## 2017-11-09 PROCEDURE — 93325 DOPPLER ECHO COLOR FLOW MAPG: CPT | Performed by: INTERNAL MEDICINE

## 2017-11-09 PROCEDURE — 99232 SBSQ HOSP IP/OBS MODERATE 35: CPT | Performed by: INTERNAL MEDICINE

## 2017-11-09 PROCEDURE — 99231 SBSQ HOSP IP/OBS SF/LOW 25: CPT | Performed by: UROLOGY

## 2017-11-09 PROCEDURE — 85025 COMPLETE CBC W/AUTO DIFF WBC: CPT

## 2017-11-09 PROCEDURE — G0109 DIAB MANAGE TRN IND/GROUP: HCPCS

## 2017-11-09 PROCEDURE — 6360000002 HC RX W HCPCS

## 2017-11-09 PROCEDURE — 2500000003 HC RX 250 WO HCPCS: Performed by: INTERNAL MEDICINE

## 2017-11-09 PROCEDURE — 87040 BLOOD CULTURE FOR BACTERIA: CPT

## 2017-11-09 PROCEDURE — 6360000002 HC RX W HCPCS: Performed by: INTERNAL MEDICINE

## 2017-11-09 PROCEDURE — 99252 IP/OBS CONSLTJ NEW/EST SF 35: CPT | Performed by: INTERNAL MEDICINE

## 2017-11-09 PROCEDURE — 36415 COLL VENOUS BLD VENIPUNCTURE: CPT

## 2017-11-09 PROCEDURE — 93325 DOPPLER ECHO COLOR FLOW MAPG: CPT

## 2017-11-09 PROCEDURE — 80048 BASIC METABOLIC PNL TOTAL CA: CPT

## 2017-11-09 PROCEDURE — 93312 ECHO TRANSESOPHAGEAL: CPT | Performed by: INTERNAL MEDICINE

## 2017-11-09 PROCEDURE — 2060000000 HC ICU INTERMEDIATE R&B

## 2017-11-09 RX ORDER — BLOOD SUGAR DIAGNOSTIC
1 STRIP MISCELLANEOUS 4 TIMES DAILY
Qty: 150 EACH | Refills: 3 | Status: SHIPPED | OUTPATIENT
Start: 2017-11-09 | End: 2017-12-27 | Stop reason: SDUPTHER

## 2017-11-09 RX ORDER — SODIUM CHLORIDE 9 MG/ML
INJECTION, SOLUTION INTRAVENOUS CONTINUOUS
Status: DISCONTINUED | OUTPATIENT
Start: 2017-11-09 | End: 2017-11-09 | Stop reason: SDUPTHER

## 2017-11-09 RX ORDER — LANCETS 28 GAUGE
1 EACH MISCELLANEOUS DAILY
Qty: 100 EACH | Refills: 3 | Status: SHIPPED | OUTPATIENT
Start: 2017-11-09 | End: 2017-12-06 | Stop reason: SDUPTHER

## 2017-11-09 RX ORDER — INSULIN GLARGINE 100 [IU]/ML
45 INJECTION, SOLUTION SUBCUTANEOUS NIGHTLY
Status: DISCONTINUED | OUTPATIENT
Start: 2017-11-09 | End: 2017-11-13

## 2017-11-09 RX ORDER — BLOOD SUGAR DIAGNOSTIC
1 STRIP MISCELLANEOUS DAILY
Qty: 100 EACH | Refills: 0 | Status: SHIPPED | OUTPATIENT
Start: 2017-11-10 | End: 2018-11-09

## 2017-11-09 RX ORDER — SODIUM CHLORIDE 0.9 % (FLUSH) 0.9 %
10 SYRINGE (ML) INJECTION EVERY 12 HOURS SCHEDULED
Status: DISCONTINUED | OUTPATIENT
Start: 2017-11-09 | End: 2017-11-09 | Stop reason: SDUPTHER

## 2017-11-09 RX ORDER — FENTANYL CITRATE 50 UG/ML
100 INJECTION, SOLUTION INTRAMUSCULAR; INTRAVENOUS ONCE
Status: COMPLETED | OUTPATIENT
Start: 2017-11-09 | End: 2017-11-09

## 2017-11-09 RX ORDER — MIDAZOLAM HYDROCHLORIDE 1 MG/ML
5 INJECTION INTRAMUSCULAR; INTRAVENOUS ONCE
Status: COMPLETED | OUTPATIENT
Start: 2017-11-09 | End: 2017-11-09

## 2017-11-09 RX ORDER — ZOLPIDEM TARTRATE 5 MG/1
5 TABLET ORAL NIGHTLY PRN
Status: DISCONTINUED | OUTPATIENT
Start: 2017-11-09 | End: 2017-11-18 | Stop reason: HOSPADM

## 2017-11-09 RX ORDER — SODIUM CHLORIDE 0.9 % (FLUSH) 0.9 %
10 SYRINGE (ML) INJECTION PRN
Status: DISCONTINUED | OUTPATIENT
Start: 2017-11-09 | End: 2017-11-09 | Stop reason: SDUPTHER

## 2017-11-09 RX ORDER — MIDAZOLAM HYDROCHLORIDE 1 MG/ML
INJECTION INTRAMUSCULAR; INTRAVENOUS
Status: COMPLETED
Start: 2017-11-09 | End: 2017-11-09

## 2017-11-09 RX ADMIN — ACETAMINOPHEN 650 MG: 325 TABLET ORAL at 12:34

## 2017-11-09 RX ADMIN — NAFCILLIN SODIUM 2 G: 2 INJECTION, POWDER, LYOPHILIZED, FOR SOLUTION INTRAMUSCULAR; INTRAVENOUS at 06:36

## 2017-11-09 RX ADMIN — NAFCILLIN SODIUM 2 G: 2 INJECTION, POWDER, LYOPHILIZED, FOR SOLUTION INTRAMUSCULAR; INTRAVENOUS at 22:00

## 2017-11-09 RX ADMIN — NAFCILLIN SODIUM 2 G: 2 INJECTION, POWDER, LYOPHILIZED, FOR SOLUTION INTRAMUSCULAR; INTRAVENOUS at 12:32

## 2017-11-09 RX ADMIN — NAFCILLIN SODIUM 2 G: 2 INJECTION, POWDER, LYOPHILIZED, FOR SOLUTION INTRAMUSCULAR; INTRAVENOUS at 03:35

## 2017-11-09 RX ADMIN — FENTANYL CITRATE 50 MCG: 50 INJECTION, SOLUTION INTRAMUSCULAR; INTRAVENOUS at 09:55

## 2017-11-09 RX ADMIN — PANTOPRAZOLE SODIUM 40 MG: 40 TABLET, DELAYED RELEASE ORAL at 06:36

## 2017-11-09 RX ADMIN — TAMSULOSIN HYDROCHLORIDE 0.4 MG: 0.4 CAPSULE ORAL at 12:31

## 2017-11-09 RX ADMIN — INSULIN GLARGINE 45 UNITS: 100 INJECTION, SOLUTION SUBCUTANEOUS at 22:12

## 2017-11-09 RX ADMIN — SODIUM CHLORIDE 75 ML/HR: 9 INJECTION, SOLUTION INTRAVENOUS at 04:24

## 2017-11-09 RX ADMIN — SODIUM CHLORIDE 75 ML/HR: 9 INJECTION, SOLUTION INTRAVENOUS at 20:29

## 2017-11-09 RX ADMIN — ACETAMINOPHEN 650 MG: 325 TABLET ORAL at 22:17

## 2017-11-09 RX ADMIN — ZOLPIDEM TARTRATE 5 MG: 5 TABLET ORAL at 22:17

## 2017-11-09 RX ADMIN — MIDAZOLAM HYDROCHLORIDE 4 MG: 1 INJECTION INTRAMUSCULAR; INTRAVENOUS at 09:50

## 2017-11-09 RX ADMIN — MIDAZOLAM HYDROCHLORIDE 4 MG: 1 INJECTION, SOLUTION INTRAMUSCULAR; INTRAVENOUS at 09:50

## 2017-11-09 RX ADMIN — NAFCILLIN SODIUM 2 G: 2 INJECTION, POWDER, LYOPHILIZED, FOR SOLUTION INTRAMUSCULAR; INTRAVENOUS at 17:31

## 2017-11-09 ASSESSMENT — ENCOUNTER SYMPTOMS
NAUSEA: 0
EYES NEGATIVE: 1
BLOOD IN STOOL: 0
GASTROINTESTINAL NEGATIVE: 1
SHORTNESS OF BREATH: 0
RESPIRATORY NEGATIVE: 1
COUGH: 0
CHEST TIGHTNESS: 0
STRIDOR: 0
WHEEZING: 0

## 2017-11-09 ASSESSMENT — PAIN SCALES - GENERAL
PAINLEVEL_OUTOF10: 0
PAINLEVEL_OUTOF10: 2
PAINLEVEL_OUTOF10: 7
PAINLEVEL_OUTOF10: 5
PAINLEVEL_OUTOF10: 2

## 2017-11-09 NOTE — PROGRESS NOTES
Hospitalist Progress Note      PCP: No primary care provider on file. Date of Admission: 2017        Subjective: The patient is experiencing mild rightsided neck pain    Medications:  Reviewed    Infusion Medications    sodium chloride 75 mL/hr (17 0424)    dextrose 100 mL/hr (17 1544)    dextrose 5 % and 0.45 % NaCl 150 mL/hr at 17 0728     Scheduled Medications    sodium chloride flush  10 mL Intravenous 2 times per day    insulin glargine  40 Units Subcutaneous Nightly    insulin lispro  14 Units Subcutaneous TID WC    pantoprazole  40 mg Oral QAM AC    nafcillin (dose greater than 1 g) IVPB  2 g Intravenous Q4H    insulin lispro  0-6 Units Subcutaneous TID WC    insulin lispro  0-3 Units Subcutaneous Nightly    insulin regular  0.1 Units/kg Intravenous Once    tamsulosin  0.4 mg Oral Daily     PRN Meds: sodium chloride flush, ondansetron, acetaminophen, calcium carbonate, glucose, glucagon (rDNA), dextrose, dextrose, dextrose 5 % and 0.45 % NaCl      Intake/Output Summary (Last 24 hours) at 17 0814  Last data filed at 17 0650   Gross per 24 hour   Intake             3580 ml   Output             2701 ml   Net              879 ml       Exam:  BP (!) 150/87   Pulse 88   Temp 97.9 °F (36.6 °C) (Oral)   Resp 18   Ht 5' 6\" (1.676 m)   Wt 167 lb 8.8 oz (76 kg)   SpO2 100%   BMI 27.04 kg/m²     Average, Min, and Max for last 24 hours Vitals:  TEMPERATURE:  Temp  Av.1 °F (36.7 °C)  Min: 97.3 °F (36.3 °C)  Max: 99.1 °F (37.3 °C)    RESPIRATIONS RANGE: Resp  Av.6  Min: 0  Max: 34    PULSE RANGE: Pulse  Av.2  Min: 86  Max: 106    BLOOD PRESSURE RANGE:  Systolic (79YRD), SYB:821 , Min:86 , JOSEPHINE:210   ; Diastolic (94YJN), ASHLEY:72, Min:50, Max:87      PULSE OXIMETRY RANGE: SpO2  Av.6 %  Min: 94 %  Max: 100 %    I/O last 3 completed shifts: In: 46 [P.O.:840;  I.V.:2740]  Out: 2701 [Urine:2700; Stool:1]    General appearance: No apparent distress, 6:18 AM   Result Value Ref Range    WBC 12.9 (H) 4.8 - 10.8 K/uL    RBC 3.67 (L) 4.70 - 6.10 M/uL    Hemoglobin 10.5 (L) 14.0 - 18.0 g/dL    Hematocrit 31.2 (L) 42.0 - 52.0 %    MCV 84.9 80.0 - 100.0 fL    MCH 28.6 27.0 - 31.3 pg    MCHC 33.7 33.0 - 37.0 %    RDW 13.0 11.5 - 14.5 %    Platelets 935 315 - 912 K/uL    Neutrophils % 79.6 %    Lymphocytes % 12.9 %    Monocytes % 6.4 %    Eosinophils % 0.7 %    Basophils % 0.4 %    Neutrophils # 10.3 (H) 1.4 - 6.5 K/uL    Lymphocytes # 1.7 1.0 - 4.8 K/uL    Monocytes # 0.8 0.2 - 0.8 K/uL    Eosinophils # 0.1 0.0 - 0.7 K/uL    Basophils # 0.0 0.0 - 0.2 K/uL           Assessment/Plan:    1. Sepsis secondary to pyelonephritis with obstructive uropathy/ MSSA endocarditis:nafcillin  Urine culture cultures and blood cultures. Infectious disease and Urology following. FILI today. 2. We also continue with Flomax 0.4 mg daily in order to help with passing of stone. 3.  DKA Resolved-Sliding scale insulin, and I could six, endocrinology following. Hemoglobin A1c 11.9.  4. Lactic acidosis: Likely secondary to diabetes along with developing DKA and pyelonephritis.       DVT Prophylaxis: via lovenox  Diet: Diet NPO Effective Now  Code Status: Full Code          Electronically signed by Jonah Valdovinos MD on 11/9/2017 at 8:14 AM

## 2017-11-09 NOTE — SEDATION DOCUMENTATION
Jonel probe inserted, pt tolerated well. MP- NSR, VR 92. Oxygen maintained at 100% wiith 4lpm per nc.

## 2017-11-09 NOTE — BRIEF OP NOTE
Section of Cardiology  Adult Brief FILI Procedure Note        Procedure(s):  FILI    Pre-operative Diagnosis:  Rule out endocarditis    H&P Status: Completed and reviewed.      Post-operative Diagnosis:  No mass, thrombus or shunt, normal LVF, mild MR    Findings:  See full report    Complications:  none    Primary Proceduralist:   Dr.Wes Sutherland DO      Full procedure note to follow

## 2017-11-09 NOTE — CONSULTS
normal. He has no wheezes. He has no rales. He exhibits no tenderness. Abdominal: Soft. Bowel sounds are normal. There is no tenderness. Musculoskeletal: Normal range of motion. He exhibits no edema or tenderness. Neurological: He is alert and oriented to person, place, and time. Skin: Skin is warm. No cyanosis. Nails show no clubbing. Results/ Medications reviewed 11/9/2017, 6:56 AM     Laboratory, Microbiology, Pathology, Radiology, Cardiology, Medications and Transcriptions reviewed  Scheduled Meds:   sodium chloride flush  10 mL Intravenous 2 times per day    insulin glargine  40 Units Subcutaneous Nightly    insulin lispro  14 Units Subcutaneous TID WC    pantoprazole  40 mg Oral QAM AC    nafcillin (dose greater than 1 g) IVPB  2 g Intravenous Q4H    insulin lispro  0-6 Units Subcutaneous TID WC    insulin lispro  0-3 Units Subcutaneous Nightly    insulin regular  0.1 Units/kg Intravenous Once    tamsulosin  0.4 mg Oral Daily     Continuous Infusions:   sodium chloride 75 mL/hr (11/09/17 0424)    dextrose 100 mL/hr (11/07/17 1544)    dextrose 5 % and 0.45 % NaCl 150 mL/hr at 11/06/17 0728       Recent Labs      11/07/17   0512  11/08/17   0533   WBC  14.9*  12.0*   HGB  10.2*  9.8*   HCT  30.6*  29.0*   MCV  83.8  84.5   PLT  228  254     Recent Labs      11/08/17   0533   NA  133   K  3.7   CL  98   CO2  24   BUN  16   CREATININE  0.80     No results for input(s): AST, ALT, ALB, BILIDIR, BILITOT, ALKPHOS in the last 72 hours. No results for input(s): LIPASE, AMYLASE in the last 72 hours. No results for input(s): PROT, INR in the last 72 hours. Ct Abdomen Pelvis Wo Iv Contrast Additional Contrast? None    Result Date: 11/7/2017  CT of the Abdomen and Pelvis without intravenous contrast medium History:  Recent distal ureteral stone. Check for residual calculus, hydronephrosis. Currently denies flank pain.  Technical Factors: CT imaging of the abdomen and pelvis were obtained and formatted as 5 mm contiguous axial images from the domes of the diaphragm to the symphysis pubis. Sagittal and coronal reconstructions were also obtained. Oral contrast medium:  None. Intravenous contrast medium:  None. Comparison:  November 5, 2017. Findings: Kidneys:  Right kidney remains enlarged with right perinephric stranding and fluid again identified, and somewhat increased since the prior study. No nephrolithiasis bilaterally. No pelvocaliectasis bilaterally. Identified. Left kidney unremarkable. Ureters: Right ureter remains mildly dilated from right ureteropelvic junction to right ureterovesical junction. 2 mm calcification at, or correctly adjacent to the right ureterovesical junction, again identified and unchanged. Course and caliber of left  ureter without anomaly. No left ureteral calculi. Bladder: Partially decompressed. Lungs:  Previously described pleural-based nodule, not definitely imaged on current study. . However, interval development of small bilateral pleural effusions and bilateral dependent lower lobe subsegmental consolidation. Liver:  Normal in size, shape, and attenuation. Gallbladder:  No stones or wall thickening. Pancreas:  Normal without masses, cysts, ductal dilatation or calcification. Spleen:  Spleen is enlarged with an AP dimension of 15.6 cm. No calcifications. No splenules. Adrenals:  Normal. Small bowel:  Normal in caliber. Appendix:  Not identified. Colon:  Diverticular change sigmoid colon. Peritoneum:  No ascites, free air, or fluid collections. Vessels: Aorta normal in course and caliber. Lymph nodes:  Retroperitoneal:  No enlarged retroperitoneal lymph nodes. Abdominal Wall: Fatty again identified within left inguinal canal. No associated small bowel or colon identified. Bones:  No bone lesions. Persistent 2 mm calculus at right ureterovesical junction with mild right hydroureter and enlarged right kidney. No right nephrolithiasis or hydronephrosis.  Right perinephric fat stranding and fluid has mildly increased since the prior study. Interval development of small bilateral pleural effusions and small bibasilar foci of dependent atelectasis/pneumonia. Splenomegaly. Sigmoid diverticulosis. Fat-containing left inguinal hernia, with no CT evidence of incarceration or obstruction. All CT scans at this facility use dose modulation, iterative reconstruction, and/or weight based dosing when appropriate to reduce radiation dose to as low as reasonably achievable. Ct Abdomen Pelvis Wo Iv Contrast Additional Contrast? None    Result Date: 11/5/2017  EXAMINATION: CT Abdomen and Pelvis without  contrast. CLINICAL HISTORY: Hematuria for 4 days. ? He did have some right flank pain that went away today. ? He has had some fevers chills and myalgias. ? He took some ibuprofen prior to arrival. COMPARISONS: None available. TECHNIQUE: Contiguous axial images were obtained through the abdomen and pelvis without contrast enhancement. Coronal reformations were made. FINDINGS: There is right-sided perinephric stranding with soft tissue density in the perirenal fat located posterior and inferior to the right kidney. There is mild dilatation of the right ureter. There is a 2 mm calcification at the expected location of  the right ureterovesical junction or within the bladder. Suspect recent passage of calculus through the right ureter into either the distal ureter or bladder. The border the right kidney is somewhat ill-defined. The right kidney may be infected. Left kidney is unremarkable. No left-sided hydronephrosis. No nephrolithiasis. Bladder is minimally distended and difficult to evaluate. Calcified phleboliths within the pelvis. There is calcification of the vas deferens. No significant right-sided hydronephrosis. No retroperitoneal fluid collection or lymphadenopathy. No aortic aneurysm. There are minimal atherosclerotic calcifications in the aorta.  Small umbilical hernia containing fat. There is discoid atelectasis at the right lung base. There is a 15 mm pleural-based nodule posteriorly in the right lower lobe. Left lung base is clear. The portions of liver, pancreas, and adrenal glands visualized have normal morphology. Mild splenic enlargement. The spleen measures 15 cm in length. No focal splenic or liver lesion. There is no bowel dilatation. Appendix is normal.  There are few diverticula in the sigmoid colon. There is no inflammation within the mesentery. No fluid collection, free fluid, or free air. Left inguinal hernia containing fat. Bones are intact. MILDLY ENLARGED RIGHT KIDNEY WITH RIGHT PERINEPHRIC STRANDING. 2 MM CALCULUS EITHER WITHIN THE RIGHT URETEROVESICAL JUNCTION OR BLADDER. SOFT TISSUE DENSITY AND FAT AT THE POSTERIOR INFERIOR ASPECT OF THE RIGHT KIDNEY, POSSIBLE EXTRAVASATED URINE. THE RIGHT KIDNEY BORDERS ARE ILL-DEFINED, POSSIBLE KIDNEY INFECTION. NO DEFINITE RIGHT-SIDED HYDRONEPHROSIS. 15 MM PLEURAL-BASED NODULE RIGHT LOWER LOBE. RIGHT BASILAR ATELECTASIS. MILD SPLENOMEGALY. LEFT INGUINAL HERNIA CONTAINING FAT. All CT scans at this facility use dose modulation, iterative reconstruction, and/or weight based dosing when appropriate to reduce radiation dose to as low as reasonably achievable. Fl Less Than 1 Hour    Result Date: 11/8/2017  EXAMINATION: FL LESS THAN 1 HOUR CLINICAL HISTORY:  cysto stent COMPARISONS: November 7, 2017 CT abdomen and pelvis FINDINGS: A total of 4 intraoperative fluoroscopic images were saved during retrograde pyelography, including images before and after right ureteral retrograde stent placement. X-RAY DOSE SUMMARY: 4 FLUOROSCOPIC IMAGES SAVED TO PACS. 0SPOT FILM WAS EXPOSED. 28.5SECOND FLUOROSCOPY TIME. 6.65MGY CUMULATIVE X-RAY DOSE. CONCLUSION: UNREMARKABLE RETROGRADE PLACEMENT OF A RIGHT URETERAL STENT, WITH ITS PROXIMAL LOOP REFORMED IN THE UPPER POLE CALYX OF THE RIGHT KIDNEY.          Impression/Plan:     Active Hospital Problems    Diagnosis Date Noted    Hydronephrosis with urinary obstruction due to ureteral calculus [N13.2]      Priority: Low    Acute pyelonephritis [N10]      Priority: Low    Sepsis ([address] [A41.9]      Priority: Low    Uncontrolled type 2 diabetes mellitus with complication, with long-term current use of insulin (HCC) [E11.8, E11.65, Z79.4]      Priority: Low    MSSA (methicillin susceptible Staphylococcus aureus) septicemia ([facility]) [A41.01]      Priority: Low    Acute bacterial endocarditis [I33.0]      Priority: Low                                            Pt will benefit from FILI to examine for IE. RBA discussed with pt and wife. Pt has no history of Esophageal pathology such as strictures                         Thank you for allowing us to participate in the care of this patient. Will continue to follow. Please call if questions or concerns arise.     Electronically signed by Isaura Quintana MD on 11/9/2017 at 6:56 AM

## 2017-11-09 NOTE — PROGRESS NOTES
UROLOGY CONSULT Progress Note-Dr Sarmiento    11/9/2017   1:17 PM    Name:  Kalyani Hamlin  MRN:    [id number]     Acct:     [de-identified]   Room:  [address] Day: 4     Admit Date: 11/5/2017 10:59 AM  PCP: No primary care provider on file. Subjective:     C/C: patient underwent stent placement yesterday  Quijano catheter also placed  Chief Complaint   Patient presents with    Hematuria     started urinating blood 4 days ago, body hurts today, took [id number]  2 hrs ago       Interval History: Status: clinically somewhat improved, urine culture from kidney growing staph    History reviewed. No pertinent past medical history. ROS:  ROS    Medications: Allergies: No Known Allergies    Current Meds:   0.9 % sodium chloride infusion Continuous   sodium chloride flush 0.9 % injection 10 mL 2 times per day   sodium chloride flush 0.9 % injection 10 mL PRN   ondansetron (ZOFRAN) injection 4 mg Q6H PRN   insulin glargine (LANTUS) injection vial 40 Units Nightly   insulin lispro (HUMALOG) injection vial 14 Units TID WC   pantoprazole (PROTONIX) tablet 40 mg QAM AC   nafcillin 2 g in dextrose 5 % 100 mL IVPB Q4H   acetaminophen (TYLENOL) tablet 650 mg Q4H PRN   insulin lispro (HUMALOG) injection vial 0-6 Units TID WC   insulin lispro (HUMALOG) injection vial 0-3 Units Nightly   0.9 % sodium chloride infusion Continuous   calcium carbonate (TUMS) chewable tablet 1,000 mg TID PRN   glucose (GLUTOSE) 40 % oral gel 15 g PRN   glucagon (rDNA) injection 1 mg PRN   dextrose 5 % solution PRN   dextrose 50 % solution 12.5 g PRN   dextrose 5 % and 0.45 % sodium chloride infusion Continuous PRN   insulin regular (HUMULIN R;NOVOLIN R) injection 7 Units Once   tamsulosin (FLOMAX) capsule 0.4 mg Daily       Data:     Code Status:  Full Code    History reviewed. No pertinent family history.     Social History     Social History    Marital status:      Spouse name: N/A    Number of children: N/A    Years of education: N/A     Occupational History    Not on file. Social History Main Topics    Smoking status: Never Smoker    Smokeless tobacco: Never Used    Alcohol use Not on file    Drug use: Unknown    Sexual activity: Not on file     Other Topics Concern    Not on file     Social History Narrative    No narrative on file       Vitals:  BP (!) 160/93   Pulse 101   Temp 97.9 °F (36.6 °C) (Oral)   Resp 16   Ht 5' 6\" (1.676 m)   Wt 167 lb 8.8 oz (76 kg)   SpO2 100%   BMI 27.04 kg/m²   Temp (24hrs), Av.2 °F (36.8 °C), Min:97.3 °F (36.3 °C), Max:99.1 °F (37.3 °C)    Recent Labs      17   1551  17   1953  17   0558  17   1224   POCGLU  195*  235*  190*  171*       I/O (24Hr): Intake/Output Summary (Last 24 hours) at 17 1317  Last data filed at 17 0650   Gross per 24 hour   Intake             3580 ml   Output             2401 ml   Net             1179 ml       Labs:  Hematology:  Recent Labs      17   0512   17   0618   WBC  14.9*   < >  12.9*   HGB  10.2*   < >  10.5*   HCT  30.6*   < >  31.2*   PLT  228   < >  318   SEDRATE  101*   --    --     < > = values in this interval not displayed.      Chemistry:  Recent Labs      17   0618   NA  136   K  4.2   CL  100   CO2  23   GLUCOSE  182*   BUN  13   CREATININE  1.02   ANIONGAP  13   LABGLOM  >60.0   GFRAA  >60.0   CALCIUM  6.8*       Urine Culture   Lab Results   Component Value Date    LABURIN  2017     50,000 CFU/ml  Sensitivity to follow  PBP2= Negative           Physical Examination:        Physical Exam patient tolerating Quijano catheter  Urine was discolored  Denies flank pain  Patient not short of breath  Abdominal exam within normal limits    Assessment:         improved clinically  Afebrile  WBC 12.9  Continue Quijano drainage  Active Hospital Problems    Diagnosis Date Noted    Hydronephrosis with urinary obstruction due to ureteral calculus [N13.2]     Acute pyelonephritis [N10]  Sepsis ([facility] [A41.9]     Uncontrolled type 2 diabetes mellitus with complication, with long-term current use of insulin (HCC) [E11.8, E11.65, Z79.4]     MSSA (methicillin susceptible Staphylococcus aureus) septicemia ([facility] [A41.01]     Acute bacterial endocarditis [I33.0]          Plan:        1.  We'll follow with you      Electronically signed by Kwesi Bustos MD on 11/9/2017 at 1:17 PM

## 2017-11-09 NOTE — PROGRESS NOTES
Infectious Diseases Inpatient Progress Note          HISTORY OF PRESENT ILLNESS:  Follow up MSSA sepsis on IV Nafcil, well tolerated . Patient had remarkable clinical improvement with decreased abdominal and R flank pain, clearing of urine in the joyner. Had R ureteral J stent placed yesterday. No fevers. Repeat Blood Cx from last night and today are pending. Current Medications:     insulin glargine  45 Units Subcutaneous Nightly    insulin lispro  16 Units Subcutaneous TID WC    sodium chloride flush  10 mL Intravenous 2 times per day    pantoprazole  40 mg Oral QAM AC    nafcillin (dose greater than 1 g) IVPB  2 g Intravenous Q4H    insulin lispro  0-6 Units Subcutaneous TID WC    insulin lispro  0-3 Units Subcutaneous Nightly    insulin regular  0.1 Units/kg Intravenous Once    tamsulosin  0.4 mg Oral Daily       Allergies:  Review of patient's allergies indicates no known allergies. ROS  14 system review is negative other than HPI    Physical Exam  Vitals:    11/09/17 1045 11/09/17 1100 11/09/17 1230 11/09/17 1358   BP:   (!) 160/93 127/87   Pulse: 89 88 101 112   Resp: 13 16 17   Temp:    98.4 °F (36.9 °C)   TempSrc:    Oral   SpO2: 98% 100% 100% 98%   Weight:       Height:         General Appearance: alert and oriented to person, place and time, well-developed and well-nourished, in no acute distress  Skin: warm and dry, no rash. Head: normocephalic and atraumatic  Eyes: pupils equal, round, and reactive to light, extraocular eye movements intact, conjunctivae normal, anicteric sclerae  ENT: oropharynx clear and moist with normal mucous membranes.  No thrush  Lungs: normal respiratory effort, Clear Lungs, no rhochi, no crackles, no wheexes  Heart:RRR, nl S1/S2, no murmur  Abdomen: soft, no tenderness, no H-S-megaly, + BS  NEUROLOGICAL: alert and oriented x 3, no focal deficits  No leg edema  No erythema, no warmth, no tenderness  Clear urine in joyner    DATA:    Lab Results   Component

## 2017-11-09 NOTE — CARE COORDINATION
Per C3, pt may need IVs at discharge. Pt may do out pt IVs.  C3 asked about a pending medicaid number in case a facility is needed. LSW spoke to Beau Simmons, and she states pt is pending medicaid (application was done at [id number] [facility]) and she will check on status of application and for a pending medicaid number. Electronically signed by BERNARDINO Anton on 11/9/17 at 1:46 PM    Per Beau Simmons, no pending medicaid number for pt. The agency [facility]) is currently working on cases submitted last week. Agency is closed tomorrow due to holiday. C3 to be updated.  Electronically signed by BERNARDINO Anton on 11/9/17 at 1:48 PM

## 2017-11-09 NOTE — CARE COORDINATION
PATIENT HAVING FILI TODAY. REMAINS ON NAFCILLIN IV Q 4H.  24H.  1 OF 2 OF THE BLOOD CULTURES DRAWN YESTERDAY ARE GROWING MSSA. YESTERDAYS URINE CULTURE GROWING MSSA. WILL DISCUSS WITH DR ELLER WHEN SHE ROUNDS.   Electronically signed by Kristen Wheat RN on 11/9/17 at 12:11 PM

## 2017-11-10 LAB
ANION GAP SERPL CALCULATED.3IONS-SCNC: 12 MEQ/L (ref 7–13)
BASOPHILS ABSOLUTE: 0.1 K/UL (ref 0–0.2)
BASOPHILS RELATIVE PERCENT: 0.6 %
BUN BLDV-MCNC: 11 MG/DL (ref 6–20)
CALCIUM SERPL-MCNC: 7.1 MG/DL (ref 8.6–10.2)
CHLORIDE BLD-SCNC: 101 MEQ/L (ref 98–107)
CO2: 24 MEQ/L (ref 22–29)
CREAT SERPL-MCNC: 0.81 MG/DL (ref 0.7–1.2)
EOSINOPHILS ABSOLUTE: 0.2 K/UL (ref 0–0.7)
EOSINOPHILS RELATIVE PERCENT: 1.3 %
GFR AFRICAN AMERICAN: >60
GFR NON-AFRICAN AMERICAN: >60
GLUCOSE BLD-MCNC: 106 MG/DL (ref 74–109)
GLUCOSE BLD-MCNC: 117 MG/DL (ref 60–115)
GLUCOSE BLD-MCNC: 167 MG/DL (ref 60–115)
GLUCOSE BLD-MCNC: 169 MG/DL (ref 60–115)
GLUCOSE BLD-MCNC: 174 MG/DL (ref 60–115)
GLUCOSE BLD-MCNC: 98 MG/DL (ref 60–115)
HCT VFR BLD CALC: 32.5 % (ref 42–52)
HEMOGLOBIN: 10.9 G/DL (ref 14–18)
LYMPHOCYTES ABSOLUTE: 1.7 K/UL (ref 1–4.8)
LYMPHOCYTES RELATIVE PERCENT: 14 %
MCH RBC QN AUTO: 28.4 PG (ref 27–31.3)
MCHC RBC AUTO-ENTMCNC: 33.4 % (ref 33–37)
MCV RBC AUTO: 85 FL (ref 80–100)
MONOCYTES ABSOLUTE: 0.7 K/UL (ref 0.2–0.8)
MONOCYTES RELATIVE PERCENT: 6.2 %
NEUTROPHILS ABSOLUTE: 9.3 K/UL (ref 1.4–6.5)
NEUTROPHILS RELATIVE PERCENT: 77.9 %
ORGANISM: ABNORMAL
ORGANISM: ABNORMAL
PDW BLD-RTO: 13 % (ref 11.5–14.5)
PERFORMED ON: ABNORMAL
PERFORMED ON: NORMAL
PLATELET # BLD: 369 K/UL (ref 130–400)
POTASSIUM SERPL-SCNC: 4.3 MEQ/L (ref 3.5–5.1)
RBC # BLD: 3.83 M/UL (ref 4.7–6.1)
SODIUM BLD-SCNC: 137 MEQ/L (ref 132–144)
URINE CULTURE, ROUTINE: ABNORMAL
WBC # BLD: 12 K/UL (ref 4.8–10.8)

## 2017-11-10 PROCEDURE — 6360000002 HC RX W HCPCS

## 2017-11-10 PROCEDURE — 80048 BASIC METABOLIC PNL TOTAL CA: CPT

## 2017-11-10 PROCEDURE — 99231 SBSQ HOSP IP/OBS SF/LOW 25: CPT | Performed by: INTERNAL MEDICINE

## 2017-11-10 PROCEDURE — 2500000003 HC RX 250 WO HCPCS: Performed by: INTERNAL MEDICINE

## 2017-11-10 PROCEDURE — 99231 SBSQ HOSP IP/OBS SF/LOW 25: CPT | Performed by: PHYSICIAN ASSISTANT

## 2017-11-10 PROCEDURE — 6370000000 HC RX 637 (ALT 250 FOR IP): Performed by: INTERNAL MEDICINE

## 2017-11-10 PROCEDURE — 2580000003 HC RX 258: Performed by: UROLOGY

## 2017-11-10 PROCEDURE — 99232 SBSQ HOSP IP/OBS MODERATE 35: CPT | Performed by: INTERNAL MEDICINE

## 2017-11-10 PROCEDURE — 2060000000 HC ICU INTERMEDIATE R&B

## 2017-11-10 PROCEDURE — 36415 COLL VENOUS BLD VENIPUNCTURE: CPT

## 2017-11-10 PROCEDURE — 85025 COMPLETE CBC W/AUTO DIFF WBC: CPT

## 2017-11-10 PROCEDURE — 2580000003 HC RX 258: Performed by: INTERNAL MEDICINE

## 2017-11-10 PROCEDURE — 99231 SBSQ HOSP IP/OBS SF/LOW 25: CPT | Performed by: UROLOGY

## 2017-11-10 PROCEDURE — 87040 BLOOD CULTURE FOR BACTERIA: CPT

## 2017-11-10 RX ORDER — MORPHINE SULFATE 4 MG/ML
INJECTION, SOLUTION INTRAMUSCULAR; INTRAVENOUS
Status: COMPLETED
Start: 2017-11-10 | End: 2017-11-10

## 2017-11-10 RX ORDER — MORPHINE SULFATE 2 MG/ML
2 INJECTION, SOLUTION INTRAMUSCULAR; INTRAVENOUS EVERY 4 HOURS PRN
Status: DISCONTINUED | OUTPATIENT
Start: 2017-11-10 | End: 2017-11-10

## 2017-11-10 RX ORDER — TRAMADOL HYDROCHLORIDE 50 MG/1
50 TABLET ORAL EVERY 6 HOURS PRN
Status: DISCONTINUED | OUTPATIENT
Start: 2017-11-10 | End: 2017-11-10

## 2017-11-10 RX ADMIN — ACETAMINOPHEN 650 MG: 325 TABLET ORAL at 09:59

## 2017-11-10 RX ADMIN — PANTOPRAZOLE SODIUM 40 MG: 40 TABLET, DELAYED RELEASE ORAL at 06:13

## 2017-11-10 RX ADMIN — ACETAMINOPHEN 650 MG: 325 TABLET ORAL at 04:46

## 2017-11-10 RX ADMIN — Medication 4 MG: at 22:39

## 2017-11-10 RX ADMIN — INSULIN GLARGINE 45 UNITS: 100 INJECTION, SOLUTION SUBCUTANEOUS at 21:26

## 2017-11-10 RX ADMIN — NAFCILLIN SODIUM 2 G: 2 INJECTION, POWDER, LYOPHILIZED, FOR SOLUTION INTRAMUSCULAR; INTRAVENOUS at 10:02

## 2017-11-10 RX ADMIN — ACETAMINOPHEN 650 MG: 325 TABLET ORAL at 17:34

## 2017-11-10 RX ADMIN — TAMSULOSIN HYDROCHLORIDE 0.4 MG: 0.4 CAPSULE ORAL at 08:22

## 2017-11-10 RX ADMIN — NAFCILLIN SODIUM 2 G: 2 INJECTION, POWDER, LYOPHILIZED, FOR SOLUTION INTRAMUSCULAR; INTRAVENOUS at 06:13

## 2017-11-10 RX ADMIN — TRAMADOL HYDROCHLORIDE 50 MG: 50 TABLET, FILM COATED ORAL at 21:26

## 2017-11-10 RX ADMIN — NAFCILLIN SODIUM 2 G: 2 INJECTION, POWDER, LYOPHILIZED, FOR SOLUTION INTRAMUSCULAR; INTRAVENOUS at 01:48

## 2017-11-10 RX ADMIN — ZOLPIDEM TARTRATE 5 MG: 5 TABLET ORAL at 22:27

## 2017-11-10 RX ADMIN — Medication 10 ML: at 08:22

## 2017-11-10 RX ADMIN — SODIUM CHLORIDE: 9 INJECTION, SOLUTION INTRAVENOUS at 15:24

## 2017-11-10 RX ADMIN — NAFCILLIN SODIUM 2 G: 2 INJECTION, POWDER, LYOPHILIZED, FOR SOLUTION INTRAMUSCULAR; INTRAVENOUS at 15:36

## 2017-11-10 ASSESSMENT — PAIN SCALES - GENERAL
PAINLEVEL_OUTOF10: 2
PAINLEVEL_OUTOF10: 7
PAINLEVEL_OUTOF10: 5
PAINLEVEL_OUTOF10: 9
PAINLEVEL_OUTOF10: 8
PAINLEVEL_OUTOF10: 10

## 2017-11-10 ASSESSMENT — ENCOUNTER SYMPTOMS
CHEST TIGHTNESS: 0
EYES NEGATIVE: 1
NAUSEA: 0
RESPIRATORY NEGATIVE: 1
GASTROINTESTINAL NEGATIVE: 1
COUGH: 0
SHORTNESS OF BREATH: 0
WHEEZING: 0
STRIDOR: 0
BLOOD IN STOOL: 0

## 2017-11-10 NOTE — PROGRESS NOTES
No cyanosis. Nails show no clubbing.        LABS:  CBC:   Lab Results   Component Value Date    WBC 12.0 11/10/2017    RBC 3.83 11/10/2017    HGB 10.9 11/10/2017    HCT 32.5 11/10/2017    MCV 85.0 11/10/2017    MCH 28.4 11/10/2017    MCHC 33.4 11/10/2017    RDW 13.0 11/10/2017     11/10/2017     CBC with Differential:    Lab Results   Component Value Date    WBC 12.0 11/10/2017    RBC 3.83 11/10/2017    HGB 10.9 11/10/2017    HCT 32.5 11/10/2017     11/10/2017    MCV 85.0 11/10/2017    MCH 28.4 11/10/2017    MCHC 33.4 11/10/2017    RDW 13.0 11/10/2017    BANDSPCT 16 11/05/2017    METASPCT 1 11/05/2017    LYMPHOPCT 14.0 11/10/2017    MONOPCT 6.2 11/10/2017    BASOPCT 0.6 11/10/2017    MONOSABS 0.7 11/10/2017    LYMPHSABS 1.7 11/10/2017    EOSABS 0.2 11/10/2017    BASOSABS 0.1 11/10/2017     CMP:    Lab Results   Component Value Date     11/10/2017    K 4.3 11/10/2017     11/10/2017    CO2 24 11/10/2017    BUN 11 11/10/2017    CREATININE 0.81 11/10/2017    GFRAA >60.0 11/10/2017    LABGLOM >60.0 11/10/2017    GLUCOSE 106 11/10/2017    PROT 6.3 11/05/2017    LABALBU 2.6 11/05/2017    CALCIUM 7.1 11/10/2017    BILITOT 0.9 11/05/2017    ALKPHOS 269 11/05/2017    AST 14 11/05/2017    ALT 27 11/05/2017     BMP:    Lab Results   Component Value Date     11/10/2017    K 4.3 11/10/2017     11/10/2017    CO2 24 11/10/2017    BUN 11 11/10/2017    LABALBU 2.6 11/05/2017    CREATININE 0.81 11/10/2017    CALCIUM 7.1 11/10/2017    GFRAA >60.0 11/10/2017    LABGLOM >60.0 11/10/2017    GLUCOSE 106 11/10/2017     Magnesium:    Lab Results   Component Value Date    MG 2.3 11/06/2017     Troponin:  No results found for: TROPONINI      Assessment/Plan:    Active Hospital Problems    Diagnosis Date Noted    Staphylococcus aureus bacteremia with sepsis ([facility] [A41.01]      Priority: Low    Hydronephrosis with urinary obstruction due to ureteral calculus [N13.2]      Priority: Low    Acute pyelonephritis [N10]      Priority: Low    Sepsis ([facility] [A41.9]      Priority: Low    Uncontrolled type 2 diabetes mellitus with complication, with long-term current use of insulin (HCC) [E11.8, E11.65, Z79.4]      Priority: Low    MSSA (methicillin susceptible Staphylococcus aureus) septicemia ([facility] [A41.01]      Priority: Low             FILI is negative for IE. We will sign off. Please call as needed. Thank You.     Electronically signed by Misael Hernandez MD on 11/10/2017 at 7:04 AM

## 2017-11-10 NOTE — PROGRESS NOTES
Years of education: N/A     Occupational History    Not on file. Social History Main Topics    Smoking status: Never Smoker    Smokeless tobacco: Never Used    Alcohol use Not on file    Drug use: Unknown    Sexual activity: Not on file     Other Topics Concern    Not on file     Social History Narrative    No narrative on file       Vitals:  /71   Pulse 89   Temp 97.9 °F (36.6 °C) (Oral)   Resp 18   Ht 5' 6\" (1.676 m)   Wt 171 lb 15.3 oz (78 kg)   SpO2 98%   BMI 27.75 kg/m²   Temp (24hrs), Av.3 °F (36.8 °C), Min:97.9 °F (36.6 °C), Max:99.1 °F (37.3 °C)    Recent Labs      17   2102  11/10/17   0629  11/10/17   1053  11/10/17   1127   POCGLU  212*  98  167*  174*       I/O (24Hr):     Intake/Output Summary (Last 24 hours) at 11/10/17 1234  Last data filed at 11/10/17 0522   Gross per 24 hour   Intake             2060 ml   Output             3370 ml   Net            -1310 ml       Labs:  Hematology:  Recent Labs      11/10/17   0511   WBC  12.0*   HGB  10.9*   HCT  32.5*   PLT  369     Chemistry:  Recent Labs      11/10/17   0510   NA  137   K  4.3   CL  101   CO2  24   GLUCOSE  106   BUN  11   CREATININE  0.81   ANIONGAP  12   LABGLOM  >60.0   GFRAA  >60.0   CALCIUM  7.1*       Urine Culture   Lab Results   Component Value Date    LABURIN 50,000 CFU/ml  PBP2= Negative   2017         Physical Examination:        Physical Exam catheter draining clear urine no evidence of suprapubic pain  Patient has mild right elbow swelling  No penile swelling    Assessment:         Improved clinically  Urine clear  Continue Quijano catheter drainage  Active Hospital Problems    Diagnosis Date Noted    Staphylococcus aureus bacteremia with sepsis ([address] [A41.01]     Hydronephrosis with urinary obstruction due to ureteral calculus [N13.2]     Acute pyelonephritis [N10]     Sepsis ([address] [A41.9]     Uncontrolled type 2 diabetes mellitus with complication, with long-term current use of insulin

## 2017-11-10 NOTE — PROGRESS NOTES
CO2 23 11/09/2017    BUN 13 11/09/2017    CREATININE 1.02 11/09/2017    GLUCOSE 182 (H) 11/09/2017    CALCIUM 6.8 (L) 11/09/2017    PROT 6.3 (L) 11/05/2017    LABALBU 2.6 (L) 11/05/2017    BILITOT 0.9 11/05/2017    ALKPHOS 269 (H) 11/05/2017    AST 14 11/05/2017    ALT 27 11/05/2017    LABGLOM >60.0 11/09/2017    GFRAA >60.0 11/09/2017    GLOB 3.7 (H) 11/05/2017           Assessment:    Condition: In stable condition. Improving. (Uncontrolled diabetes   S/p DKA  Pyelonephritis   sepsis MSSA). Plan:   (Continue lantus 45 units at bedtime plus Humalog 16 units tid   continue iv antibiotics   D/c plan next week ).        Sofi Kathleen MD  11/9/2017

## 2017-11-10 NOTE — PROGRESS NOTES
age and cooperative. HEENT: Pupils equal, round, and reactive to light. Conjunctivae/corneas clear. Neck: Supple, with full range of motion. No jugular venous distention. Trachea midline. Respiratory:  Normal respiratory effort. Clear to auscultation, bilaterally without Rales/Wheezes/Rhonchi. Cardiovascular: Regular rate and rhythm with normal S1/S2 without murmurs, rubs or gallops. Abdomen: Soft, non-tender, non-distended with normal bowel sounds. Musculoskeletal: No clubbing, cyanosis or edema bilaterally. Full range of motion without deformity. Skin: Skin color, texture, turgor normal.  No rashes or lesions.   Neurologic:  grossly non-focal.  Ext: no c/e/e    Labs:     Recent Results (from the past 24 hour(s))   POCT Glucose    Collection Time: 11/09/17 12:24 PM   Result Value Ref Range    POC Glucose 171 (H) 60 - 115 mg/dl    Performed on ACCU-CHEK    POCT Glucose    Collection Time: 11/09/17  3:51 PM   Result Value Ref Range    POC Glucose 222 (H) 60 - 115 mg/dl    Performed on ACCU-CHEK    POCT Glucose    Collection Time: 11/09/17  9:02 PM   Result Value Ref Range    POC Glucose 212 (H) 60 - 115 mg/dl    Performed on ACCU-CHEK    Basic Metabolic Panel    Collection Time: 11/10/17  5:10 AM   Result Value Ref Range    Sodium 137 132 - 144 mEq/L    Potassium 4.3 3.5 - 5.1 mEq/L    Chloride 101 98 - 107 mEq/L    CO2 24 22 - 29 mEq/L    Anion Gap 12 7 - 13 mEq/L    Glucose 106 74 - 109 mg/dL    BUN 11 6 - 20 mg/dL    CREATININE 0.81 0.70 - 1.20 mg/dL    GFR Non-African American >60.0 >60    GFR  >60.0 >60    Calcium 7.1 (L) 8.6 - 10.2 mg/dL   CBC Auto Differential    Collection Time: 11/10/17  5:11 AM   Result Value Ref Range    WBC 12.0 (H) 4.8 - 10.8 K/uL    RBC 3.83 (L) 4.70 - 6.10 M/uL    Hemoglobin 10.9 (L) 14.0 - 18.0 g/dL    Hematocrit 32.5 (L) 42.0 - 52.0 %    MCV 85.0 80.0 - 100.0 fL    MCH 28.4 27.0 - 31.3 pg    MCHC 33.4 33.0 - 37.0 %    RDW 13.0 11.5 - 14.5 %    Platelets 843 628 - 400 K/uL    Neutrophils % 77.9 %    Lymphocytes % 14.0 %    Monocytes % 6.2 %    Eosinophils % 1.3 %    Basophils % 0.6 %    Neutrophils # 9.3 (H) 1.4 - 6.5 K/uL    Lymphocytes # 1.7 1.0 - 4.8 K/uL    Monocytes # 0.7 0.2 - 0.8 K/uL    Eosinophils # 0.2 0.0 - 0.7 K/uL    Basophils # 0.1 0.0 - 0.2 K/uL   POCT Glucose    Collection Time: 11/10/17  6:29 AM   Result Value Ref Range    POC Glucose 98 60 - 115 mg/dl    Performed on ACCU-CHEK            Assessment/Plan:    1. Sepsis secondary to pyelonephritis with obstructive uropathy/ MSSA endocarditis 1/2 cultures positive today:nafcillin  Urine culture cultures and blood cultures pending. Infectious disease and Urology following. 2. We also continue with Flomax 0.4 mg daily in order to help with passing of stone. 3.  DKA Resolved-Sliding scale insulin, accuchecks ac and hs, endocrinology following. Hemoglobin A1c 11.9.       DVT Prophylaxis: via lovenox  Diet: DIET CARB CONTROL; Carb Control: 5 carbs/meal (approximate 2000 kcals/day)  Code Status: Full Code          Electronically signed by Maia Bland MD on 11/10/2017 at 9:49 AM

## 2017-11-11 ENCOUNTER — APPOINTMENT (OUTPATIENT)
Dept: MRI IMAGING | Age: 52
DRG: 720 | End: 2017-11-11
Payer: MEDICAID

## 2017-11-11 ENCOUNTER — APPOINTMENT (OUTPATIENT)
Dept: ULTRASOUND IMAGING | Age: 52
DRG: 720 | End: 2017-11-11
Payer: MEDICAID

## 2017-11-11 LAB
ANION GAP SERPL CALCULATED.3IONS-SCNC: 12 MEQ/L (ref 7–13)
BASOPHILS ABSOLUTE: 0.1 K/UL (ref 0–0.2)
BASOPHILS RELATIVE PERCENT: 0.6 %
BLOOD CULTURE, ROUTINE: ABNORMAL
BUN BLDV-MCNC: 8 MG/DL (ref 6–20)
CALCIUM SERPL-MCNC: 7.3 MG/DL (ref 8.6–10.2)
CHLORIDE BLD-SCNC: 99 MEQ/L (ref 98–107)
CO2: 23 MEQ/L (ref 22–29)
CREAT SERPL-MCNC: 0.91 MG/DL (ref 0.7–1.2)
CULTURE, BLOOD 2: ABNORMAL
EOSINOPHILS ABSOLUTE: 0.2 K/UL (ref 0–0.7)
EOSINOPHILS RELATIVE PERCENT: 1.2 %
GFR AFRICAN AMERICAN: >60
GFR NON-AFRICAN AMERICAN: >60
GLUCOSE BLD-MCNC: 143 MG/DL (ref 60–115)
GLUCOSE BLD-MCNC: 149 MG/DL (ref 60–115)
GLUCOSE BLD-MCNC: 164 MG/DL (ref 60–115)
GLUCOSE BLD-MCNC: 175 MG/DL (ref 74–109)
GLUCOSE BLD-MCNC: 92 MG/DL (ref 60–115)
HCT VFR BLD CALC: 31.1 % (ref 42–52)
HEMOGLOBIN: 10.5 G/DL (ref 14–18)
LYMPHOCYTES ABSOLUTE: 1.3 K/UL (ref 1–4.8)
LYMPHOCYTES RELATIVE PERCENT: 10.1 %
MCH RBC QN AUTO: 28.7 PG (ref 27–31.3)
MCHC RBC AUTO-ENTMCNC: 33.6 % (ref 33–37)
MCV RBC AUTO: 85.4 FL (ref 80–100)
MONOCYTES ABSOLUTE: 0.8 K/UL (ref 0.2–0.8)
MONOCYTES RELATIVE PERCENT: 6.6 %
NEUTROPHILS ABSOLUTE: 10.2 K/UL (ref 1.4–6.5)
NEUTROPHILS RELATIVE PERCENT: 81.5 %
ORGANISM: ABNORMAL
PDW BLD-RTO: 13.6 % (ref 11.5–14.5)
PERFORMED ON: ABNORMAL
PERFORMED ON: NORMAL
PLATELET # BLD: 385 K/UL (ref 130–400)
POTASSIUM SERPL-SCNC: 4.5 MEQ/L (ref 3.5–5.1)
RBC # BLD: 3.64 M/UL (ref 4.7–6.1)
SODIUM BLD-SCNC: 134 MEQ/L (ref 132–144)
WBC # BLD: 12.5 K/UL (ref 4.8–10.8)

## 2017-11-11 PROCEDURE — 87186 SC STD MICRODIL/AGAR DIL: CPT

## 2017-11-11 PROCEDURE — 87040 BLOOD CULTURE FOR BACTERIA: CPT

## 2017-11-11 PROCEDURE — 2580000003 HC RX 258: Performed by: INTERNAL MEDICINE

## 2017-11-11 PROCEDURE — 80048 BASIC METABOLIC PNL TOTAL CA: CPT

## 2017-11-11 PROCEDURE — 99233 SBSQ HOSP IP/OBS HIGH 50: CPT | Performed by: INTERNAL MEDICINE

## 2017-11-11 PROCEDURE — 36415 COLL VENOUS BLD VENIPUNCTURE: CPT

## 2017-11-11 PROCEDURE — 85025 COMPLETE CBC W/AUTO DIFF WBC: CPT

## 2017-11-11 PROCEDURE — 93971 EXTREMITY STUDY: CPT

## 2017-11-11 PROCEDURE — 72156 MRI NECK SPINE W/O & W/DYE: CPT

## 2017-11-11 PROCEDURE — 2580000003 HC RX 258: Performed by: UROLOGY

## 2017-11-11 PROCEDURE — 6360000004 HC RX CONTRAST MEDICATION: Performed by: INTERNAL MEDICINE

## 2017-11-11 PROCEDURE — 2500000003 HC RX 250 WO HCPCS: Performed by: INTERNAL MEDICINE

## 2017-11-11 PROCEDURE — 6370000000 HC RX 637 (ALT 250 FOR IP): Performed by: INTERNAL MEDICINE

## 2017-11-11 PROCEDURE — 87077 CULTURE AEROBIC IDENTIFY: CPT

## 2017-11-11 PROCEDURE — 87147 CULTURE TYPE IMMUNOLOGIC: CPT

## 2017-11-11 PROCEDURE — 6360000002 HC RX W HCPCS: Performed by: INTERNAL MEDICINE

## 2017-11-11 PROCEDURE — A9579 GAD-BASE MR CONTRAST NOS,1ML: HCPCS | Performed by: INTERNAL MEDICINE

## 2017-11-11 PROCEDURE — 2060000000 HC ICU INTERMEDIATE R&B

## 2017-11-11 RX ADMIN — NAFCILLIN SODIUM 2 G: 2 INJECTION, POWDER, LYOPHILIZED, FOR SOLUTION INTRAMUSCULAR; INTRAVENOUS at 04:17

## 2017-11-11 RX ADMIN — HYDROMORPHONE HYDROCHLORIDE 1 MG: 1 INJECTION, SOLUTION INTRAMUSCULAR; INTRAVENOUS; SUBCUTANEOUS at 10:40

## 2017-11-11 RX ADMIN — GADOTERIDOL 15 ML: 279.3 INJECTION, SOLUTION INTRAVENOUS at 13:24

## 2017-11-11 RX ADMIN — NAFCILLIN SODIUM 2 G: 2 INJECTION, POWDER, LYOPHILIZED, FOR SOLUTION INTRAMUSCULAR; INTRAVENOUS at 00:08

## 2017-11-11 RX ADMIN — SODIUM CHLORIDE, PRESERVATIVE FREE 10 ML: 5 INJECTION INTRAVENOUS at 10:40

## 2017-11-11 RX ADMIN — HYDROMORPHONE HYDROCHLORIDE 1 MG: 1 INJECTION, SOLUTION INTRAMUSCULAR; INTRAVENOUS; SUBCUTANEOUS at 21:54

## 2017-11-11 RX ADMIN — HYDROMORPHONE HYDROCHLORIDE 1 MG: 1 INJECTION, SOLUTION INTRAMUSCULAR; INTRAVENOUS; SUBCUTANEOUS at 16:46

## 2017-11-11 RX ADMIN — ACETAMINOPHEN 650 MG: 325 TABLET ORAL at 20:18

## 2017-11-11 RX ADMIN — INSULIN GLARGINE 45 UNITS: 100 INJECTION, SOLUTION SUBCUTANEOUS at 22:02

## 2017-11-11 RX ADMIN — NAFCILLIN SODIUM 2 G: 2 INJECTION, POWDER, LYOPHILIZED, FOR SOLUTION INTRAMUSCULAR; INTRAVENOUS at 08:24

## 2017-11-11 RX ADMIN — ZOLPIDEM TARTRATE 5 MG: 5 TABLET ORAL at 21:54

## 2017-11-11 RX ADMIN — SODIUM CHLORIDE, PRESERVATIVE FREE 10 ML: 5 INJECTION INTRAVENOUS at 16:46

## 2017-11-11 RX ADMIN — TAMSULOSIN HYDROCHLORIDE 0.4 MG: 0.4 CAPSULE ORAL at 08:47

## 2017-11-11 RX ADMIN — SODIUM CHLORIDE: 9 INJECTION, SOLUTION INTRAVENOUS at 06:15

## 2017-11-11 RX ADMIN — NAFCILLIN SODIUM: 2 INJECTION, POWDER, LYOPHILIZED, FOR SOLUTION INTRAMUSCULAR; INTRAVENOUS at 18:00

## 2017-11-11 RX ADMIN — HYDROMORPHONE HYDROCHLORIDE 1 MG: 1 INJECTION, SOLUTION INTRAMUSCULAR; INTRAVENOUS; SUBCUTANEOUS at 00:06

## 2017-11-11 RX ADMIN — PANTOPRAZOLE SODIUM 40 MG: 40 TABLET, DELAYED RELEASE ORAL at 06:14

## 2017-11-11 RX ADMIN — HYDROMORPHONE HYDROCHLORIDE 1 MG: 1 INJECTION, SOLUTION INTRAMUSCULAR; INTRAVENOUS; SUBCUTANEOUS at 04:17

## 2017-11-11 RX ADMIN — NAFCILLIN SODIUM: 2 INJECTION, POWDER, LYOPHILIZED, FOR SOLUTION INTRAMUSCULAR; INTRAVENOUS at 21:54

## 2017-11-11 RX ADMIN — NAFCILLIN SODIUM 2 G: 2 INJECTION, POWDER, LYOPHILIZED, FOR SOLUTION INTRAMUSCULAR; INTRAVENOUS at 14:09

## 2017-11-11 ASSESSMENT — PAIN SCALES - GENERAL
PAINLEVEL_OUTOF10: 3
PAINLEVEL_OUTOF10: 7
PAINLEVEL_OUTOF10: 8
PAINLEVEL_OUTOF10: 7
PAINLEVEL_OUTOF10: 9
PAINLEVEL_OUTOF10: 7
PAINLEVEL_OUTOF10: 2
PAINLEVEL_OUTOF10: 0
PAINLEVEL_OUTOF10: 3

## 2017-11-11 ASSESSMENT — PAIN DESCRIPTION - LOCATION
LOCATION: BACK;NECK
LOCATION: BACK

## 2017-11-11 ASSESSMENT — PAIN DESCRIPTION - PAIN TYPE
TYPE: ACUTE PAIN
TYPE: ACUTE PAIN

## 2017-11-11 NOTE — PROGRESS NOTES
MSSA endocarditis  MSSA pyelonephritis     Had R ureteral J stent placed Hydronephrosis with urinary obstruction due to ureteral calculus   Objective improvement in pain     CT scan showed right perinephric stranding no hydronephrosis     2-D echo of the heart does not show any vegetation     FILI done 11/9/17 No Vegetations   Blood cultures remain positive           Review of Systems   Constitutional: Positive for chills, diaphoresis, fever and malaise/fatigue. Negative for weight loss. HENT: Negative. Eyes: Negative. Respiratory: Negative. Cardiovascular: Negative. Gastrointestinal: Negative. Genitourinary: Positive for dysuria, flank pain, frequency, hematuria and urgency. Musculoskeletal: Complains of some neck pain today  Skin: Negative. Neurological: Positive for weakness. Negative for dizziness, tingling, sensory change, seizures and headaches.         Family medical historyDiabetes hypertension   No Known Allergies     BP (!) 160/92   Pulse 101   Temp 98.8 °F (37.1 °C) (Oral)   Resp 18   Ht 5' 6\" (1.676 m)   Wt 167 lb 8.8 oz (76 kg)   SpO2 99%   BMI 27.04 kg/m²     Head: Normocephalic. Eyes: Pupils are equal, round, and reactive to light. Neck: No JVD present. No tracheal deviation present. No thyromegaly present. Cardiovascular: Normal rate, normal heart sounds and intact distal pulses. Exam reveals no gallop and no friction rub. No murmur heard. Pulmonary/Chest: Effort normal and breath sounds normal. No respiratory distress. He has no wheezes. He has no rales. He exhibits no tenderness. Abdominal: Soft. Bowel sounds are normal. He exhibits no distension and no mass. There is no tenderness. There is no rebound and no guarding. Musculoskeletal: Normal range of motion. He exhibits no edema or tenderness. Lymphadenopathy:     He has no cervical adenopathy. Neurological: He is alert and oriented to person, place, and time. No cranial nerve deficit.    Skin: Skin is warm and dry. No rash noted. No erythema. No pallor.            EXAMINATION: MRI CERVICAL SPINE W WO CONTRAST       DATE AND TIME:11/10/2017 8:45 PM       CLINICAL HISTORY: Severe neck pain  SPINE INFECTION         COMPARISON: If available previous films were reviewed for comparison.       TECHNIQUE: Multiplanar, multi-sequence MRI scans . 15 mL of ProHance contrast ministered intravenously.       FINDINGS: There is some abnormal enhancing prevertebral soft tissue extending from the level of C3-C7 in the region of the retropharyngeal spaces. There also is some enhancement of the longus coli muscles consistent with inflammation. Some paravertebral    enhancement as well primarily at the C4-C6 levels. No organized paraspinal or epidural abscess. Findings consistent with an inflammatory process/phlegmon. Early microabscess formation is not entirely excluded. The cervical vertebral bodies remain intact    without MR evidence of discitis or osteomyelitis. Marrow signal is unremarkable. Degenerative disc changes primarily at C5-6 and C6-7 with resulting canal narrowing at the C6-7 level noted. Dens atlas articulation within normal limits. Cervical cord is    normal in signal intensity.           Impression   PREVERTEBRAL SOFT TISSUE INFLAMMATORY CHANGES FROM APPROXIMATELY C3-C7.  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                 Patient Active Problem List   Diagnosis    Acute pyelonephritis    Sepsis ([address]    Uncontrolled type 2 diabetes mellitus with complication, with long-term current use of insulin ([address]            ASSESSMENT:PLAN:  MSSA endocarditis  MSSA pyelonephritis     FILI and regular echo showed no evidence of vegetation on the bowel is however he has persistent multiple positive blood cultures continue endocarditis a possible diagnosis he also has pyelonephritis significant infection of the kidney status post stent placement for obstruction    MRI consistent with spine infection

## 2017-11-11 NOTE — PROGRESS NOTES
he also has pyelonephritis significant infection of the kidney status post stent placement for obstruction    Now complaining of neck pain need to do MRI to rule out the possibility of cervical osteomyelitis    Continue nafcillin

## 2017-11-11 NOTE — CARE COORDINATION
STILL AWAITING NEGATIVE BLOOD CULTURES AND ID FINAL PLAN-AT LEAST A FEW MORE DAYS. NOTE WAS LEFT ABOUT PT NOT HAVING INSURANCE.

## 2017-11-11 NOTE — PROGRESS NOTES
Hospitalist Progress Note      PCP: No primary care provider on file. Date of Admission: 2017    Subjective: The pt is experiencing back pain    Medications:  Reviewed    Infusion Medications    sodium chloride 75 mL/hr at 17 0615    dextrose 100 mL/hr (17 1544)    dextrose 5 % and 0.45 % NaCl 150 mL/hr at 17 0728     Scheduled Medications    insulin glargine  45 Units Subcutaneous Nightly    insulin lispro  16 Units Subcutaneous TID WC    sodium chloride flush  10 mL Intravenous 2 times per day    pantoprazole  40 mg Oral QAM AC    nafcillin (dose greater than 1 g) IVPB  2 g Intravenous Q4H    insulin lispro  0-6 Units Subcutaneous TID WC    insulin lispro  0-3 Units Subcutaneous Nightly    insulin regular  0.1 Units/kg Intravenous Once    tamsulosin  0.4 mg Oral Daily     PRN Meds: HYDROmorphone, zolpidem, sodium chloride flush, ondansetron, acetaminophen, calcium carbonate, glucose, glucagon (rDNA), dextrose, dextrose, dextrose 5 % and 0.45 % NaCl      Intake/Output Summary (Last 24 hours) at 17 1040  Last data filed at 17 0836   Gross per 24 hour   Intake              340 ml   Output             4200 ml   Net            -3860 ml       Exam:  BP (!) 165/92   Pulse 104   Temp 99 °F (37.2 °C) (Oral)   Resp 16   Ht 5' 6\" (1.676 m)   Wt 167 lb 8.8 oz (76 kg)   SpO2 96%   BMI 27.04 kg/m²     Average, Min, and Max for last 24 hours Vitals:  TEMPERATURE:  Temp  Av.6 °F (37 °C)  Min: 98.1 °F (36.7 °C)  Max: 99 °F (37.2 °C)    RESPIRATIONS RANGE: Resp  Av.8  Min: 16  Max: 18    PULSE RANGE: Pulse  Av  Min: 101  Max: 104    BLOOD PRESSURE RANGE:  Systolic (45VTU), AHI:560 , Min:152 , UOD:256   ; Diastolic (95MXD), IWV:42, Min:77, Max:92      PULSE OXIMETRY RANGE: SpO2  Av %  Min: 96 %  Max: 100 %    I/O last 3 completed shifts:   In: 100 [IV Piggyback:100]  Out: 3700 [Urine:3700]    General appearance: No apparent distress, appears stated age and cooperative. HEENT: Pupils equal, round, and reactive to light. Conjunctivae/corneas clear. Neck: Supple, with full range of motion. No jugular venous distention. Trachea midline. Respiratory:  Normal respiratory effort. Clear to auscultation, bilaterally without Rales/Wheezes/Rhonchi. Cardiovascular: Regular rate and rhythm with normal S1/S2 without murmurs, rubs or gallops. Abdomen: Soft, non-tender, non-distended with normal bowel sounds. Musculoskeletal: No clubbing, cyanosis or edema bilaterally. Full range of motion without deformity. Skin: Skin color, texture, turgor normal.  No rashes or lesions.   Neurologic:  grossly non-focal.  Ext: no c/e/e    Labs:     Recent Results (from the past 24 hour(s))   POCT Glucose    Collection Time: 11/10/17 10:53 AM   Result Value Ref Range    POC Glucose 167 (H) 60 - 115 mg/dl    Performed on ACCU-CHEK    POCT Glucose    Collection Time: 11/10/17 11:27 AM   Result Value Ref Range    POC Glucose 174 (H) 60 - 115 mg/dl    Performed on ACCU-CHEK    POCT Glucose    Collection Time: 11/10/17  3:44 PM   Result Value Ref Range    POC Glucose 169 (H) 60 - 115 mg/dl    Performed on ACCU-CHEK    POCT Glucose    Collection Time: 11/10/17  7:26 PM   Result Value Ref Range    POC Glucose 117 (H) 60 - 115 mg/dl    Performed on ACCU-CHEK    Basic Metabolic Panel    Collection Time: 11/11/17  5:50 AM   Result Value Ref Range    Sodium 134 132 - 144 mEq/L    Potassium 4.5 3.5 - 5.1 mEq/L    Chloride 99 98 - 107 mEq/L    CO2 23 22 - 29 mEq/L    Anion Gap 12 7 - 13 mEq/L    Glucose 175 (H) 74 - 109 mg/dL    BUN 8 6 - 20 mg/dL    CREATININE 0.91 0.70 - 1.20 mg/dL    GFR Non-African American >60.0 >60    GFR  >60.0 >60    Calcium 7.3 (L) 8.6 - 10.2 mg/dL   CBC Auto Differential    Collection Time: 11/11/17  5:50 AM   Result Value Ref Range    WBC 12.5 (H) 4.8 - 10.8 K/uL    RBC 3.64 (L) 4.70 - 6.10 M/uL    Hemoglobin 10.5 (L) 14.0 - 18.0 g/dL    Hematocrit 31.1 (L) 42.0 - 52.0 %    MCV 85.4 80.0 - 100.0 fL    MCH 28.7 27.0 - 31.3 pg    MCHC 33.6 33.0 - 37.0 %    RDW 13.6 11.5 - 14.5 %    Platelets 571 125 - 573 K/uL    Neutrophils % 81.5 %    Lymphocytes % 10.1 %    Monocytes % 6.6 %    Eosinophils % 1.2 %    Basophils % 0.6 %    Neutrophils # 10.2 (H) 1.4 - 6.5 K/uL    Lymphocytes # 1.3 1.0 - 4.8 K/uL    Monocytes # 0.8 0.2 - 0.8 K/uL    Eosinophils # 0.2 0.0 - 0.7 K/uL    Basophils # 0.1 0.0 - 0.2 K/uL   POCT Glucose    Collection Time: 11/11/17  6:18 AM   Result Value Ref Range    POC Glucose 164 (H) 60 - 115 mg/dl    Performed on ACCU-CHEK            Assessment/Plan:    1. Sepsis secondary to pyelonephritis with obstructive uropathy/ MSSA endocarditis. Blood cultures have continued to remain positive:-Obtaining daily surveillance blood cultures, continuing nafcillin   2. Infectious disease and Urology following. Obtaining an MRI. Considering transfer to tertiary center given persistently positive blood cultures and leukocytosis. 3. Continuing with Flomax 0.4 mg daily in order to help with passing of stone. 4. DKA Resolved, Blood sugars well controlled-Sliding scale insulin, accuchecks ac and hs, endocrinology input appreciated. Hemoglobin A1c 11.9.       DVT Prophylaxis: via lovenox  Diet: DIET CARB CONTROL; Carb Control: 5 carbs/meal (approximate 2000 kcals/day)  Code Status: Full Code          Electronically signed by Arianna Chahal MD on 11/11/2017 at 10:40 AM

## 2017-11-12 LAB
ANION GAP SERPL CALCULATED.3IONS-SCNC: 12 MEQ/L (ref 7–13)
BASOPHILS ABSOLUTE: 0.1 K/UL (ref 0–0.2)
BASOPHILS RELATIVE PERCENT: 0.7 %
BUN BLDV-MCNC: 8 MG/DL (ref 6–20)
CALCIUM SERPL-MCNC: 7.5 MG/DL (ref 8.6–10.2)
CHLORIDE BLD-SCNC: 97 MEQ/L (ref 98–107)
CO2: 22 MEQ/L (ref 22–29)
CREAT SERPL-MCNC: 0.82 MG/DL (ref 0.7–1.2)
CULTURE, BLOOD 2: ABNORMAL
CULTURE, BLOOD 2: ABNORMAL
EOSINOPHILS ABSOLUTE: 0.2 K/UL (ref 0–0.7)
EOSINOPHILS RELATIVE PERCENT: 1.3 %
GFR AFRICAN AMERICAN: >60
GFR NON-AFRICAN AMERICAN: >60
GLUCOSE BLD-MCNC: 110 MG/DL (ref 60–115)
GLUCOSE BLD-MCNC: 122 MG/DL (ref 60–115)
GLUCOSE BLD-MCNC: 145 MG/DL (ref 60–115)
GLUCOSE BLD-MCNC: 85 MG/DL (ref 74–109)
GLUCOSE BLD-MCNC: 92 MG/DL (ref 60–115)
HCT VFR BLD CALC: 28.5 % (ref 42–52)
HEMOGLOBIN: 9.5 G/DL (ref 14–18)
LYMPHOCYTES ABSOLUTE: 1.2 K/UL (ref 1–4.8)
LYMPHOCYTES RELATIVE PERCENT: 9.9 %
MCH RBC QN AUTO: 28.4 PG (ref 27–31.3)
MCHC RBC AUTO-ENTMCNC: 33.4 % (ref 33–37)
MCV RBC AUTO: 84.8 FL (ref 80–100)
MONOCYTES ABSOLUTE: 0.9 K/UL (ref 0.2–0.8)
MONOCYTES RELATIVE PERCENT: 7 %
NEUTROPHILS ABSOLUTE: 9.9 K/UL (ref 1.4–6.5)
NEUTROPHILS RELATIVE PERCENT: 81.1 %
ORGANISM: ABNORMAL
PDW BLD-RTO: 13.4 % (ref 11.5–14.5)
PERFORMED ON: ABNORMAL
PERFORMED ON: ABNORMAL
PERFORMED ON: NORMAL
PERFORMED ON: NORMAL
PLATELET # BLD: 406 K/UL (ref 130–400)
POTASSIUM SERPL-SCNC: 4.2 MEQ/L (ref 3.5–5.1)
RBC # BLD: 3.37 M/UL (ref 4.7–6.1)
SODIUM BLD-SCNC: 131 MEQ/L (ref 132–144)
WBC # BLD: 12.3 K/UL (ref 4.8–10.8)

## 2017-11-12 PROCEDURE — 2060000000 HC ICU INTERMEDIATE R&B

## 2017-11-12 PROCEDURE — 85025 COMPLETE CBC W/AUTO DIFF WBC: CPT

## 2017-11-12 PROCEDURE — 36415 COLL VENOUS BLD VENIPUNCTURE: CPT

## 2017-11-12 PROCEDURE — 6370000000 HC RX 637 (ALT 250 FOR IP): Performed by: INTERNAL MEDICINE

## 2017-11-12 PROCEDURE — 2580000003 HC RX 258: Performed by: UROLOGY

## 2017-11-12 PROCEDURE — 99232 SBSQ HOSP IP/OBS MODERATE 35: CPT | Performed by: INTERNAL MEDICINE

## 2017-11-12 PROCEDURE — 6360000002 HC RX W HCPCS: Performed by: INTERNAL MEDICINE

## 2017-11-12 PROCEDURE — 80048 BASIC METABOLIC PNL TOTAL CA: CPT

## 2017-11-12 PROCEDURE — 2580000003 HC RX 258: Performed by: INTERNAL MEDICINE

## 2017-11-12 PROCEDURE — 2500000003 HC RX 250 WO HCPCS: Performed by: INTERNAL MEDICINE

## 2017-11-12 RX ADMIN — NAFCILLIN SODIUM: 2 INJECTION, POWDER, LYOPHILIZED, FOR SOLUTION INTRAMUSCULAR; INTRAVENOUS at 06:10

## 2017-11-12 RX ADMIN — NAFCILLIN SODIUM: 2 INJECTION, POWDER, LYOPHILIZED, FOR SOLUTION INTRAMUSCULAR; INTRAVENOUS at 02:09

## 2017-11-12 RX ADMIN — TAMSULOSIN HYDROCHLORIDE 0.4 MG: 0.4 CAPSULE ORAL at 08:53

## 2017-11-12 RX ADMIN — NAFCILLIN SODIUM: 2 INJECTION, POWDER, LYOPHILIZED, FOR SOLUTION INTRAMUSCULAR; INTRAVENOUS at 10:28

## 2017-11-12 RX ADMIN — SODIUM CHLORIDE: 9 INJECTION, SOLUTION INTRAVENOUS at 01:18

## 2017-11-12 RX ADMIN — SODIUM CHLORIDE: 9 INJECTION, SOLUTION INTRAVENOUS at 18:16

## 2017-11-12 RX ADMIN — HYDROMORPHONE HYDROCHLORIDE 1 MG: 1 INJECTION, SOLUTION INTRAMUSCULAR; INTRAVENOUS; SUBCUTANEOUS at 14:41

## 2017-11-12 RX ADMIN — SODIUM CHLORIDE, PRESERVATIVE FREE 10 ML: 5 INJECTION INTRAVENOUS at 18:19

## 2017-11-12 RX ADMIN — HYDROMORPHONE HYDROCHLORIDE 1 MG: 1 INJECTION, SOLUTION INTRAMUSCULAR; INTRAVENOUS; SUBCUTANEOUS at 10:29

## 2017-11-12 RX ADMIN — SODIUM CHLORIDE, PRESERVATIVE FREE 10 ML: 5 INJECTION INTRAVENOUS at 10:29

## 2017-11-12 RX ADMIN — NAFCILLIN SODIUM: 2 INJECTION, POWDER, LYOPHILIZED, FOR SOLUTION INTRAMUSCULAR; INTRAVENOUS at 18:16

## 2017-11-12 RX ADMIN — Medication 10 ML: at 08:53

## 2017-11-12 RX ADMIN — INSULIN GLARGINE 45 UNITS: 100 INJECTION, SOLUTION SUBCUTANEOUS at 22:30

## 2017-11-12 RX ADMIN — ZOLPIDEM TARTRATE 5 MG: 5 TABLET ORAL at 22:25

## 2017-11-12 RX ADMIN — PANTOPRAZOLE SODIUM 40 MG: 40 TABLET, DELAYED RELEASE ORAL at 06:10

## 2017-11-12 RX ADMIN — NAFCILLIN SODIUM: 2 INJECTION, POWDER, LYOPHILIZED, FOR SOLUTION INTRAMUSCULAR; INTRAVENOUS at 22:26

## 2017-11-12 RX ADMIN — HYDROMORPHONE HYDROCHLORIDE 1 MG: 1 INJECTION, SOLUTION INTRAMUSCULAR; INTRAVENOUS; SUBCUTANEOUS at 22:24

## 2017-11-12 RX ADMIN — NAFCILLIN SODIUM: 2 INJECTION, POWDER, LYOPHILIZED, FOR SOLUTION INTRAMUSCULAR; INTRAVENOUS at 14:24

## 2017-11-12 RX ADMIN — SODIUM CHLORIDE, PRESERVATIVE FREE 10 ML: 5 INJECTION INTRAVENOUS at 14:42

## 2017-11-12 RX ADMIN — HYDROMORPHONE HYDROCHLORIDE 1 MG: 1 INJECTION, SOLUTION INTRAMUSCULAR; INTRAVENOUS; SUBCUTANEOUS at 06:16

## 2017-11-12 ASSESSMENT — PAIN DESCRIPTION - PAIN TYPE
TYPE: ACUTE PAIN
TYPE: ACUTE PAIN

## 2017-11-12 ASSESSMENT — PAIN DESCRIPTION - LOCATION
LOCATION: BACK;NECK
LOCATION: BACK;NECK

## 2017-11-12 ASSESSMENT — PAIN SCALES - GENERAL
PAINLEVEL_OUTOF10: 2
PAINLEVEL_OUTOF10: 7
PAINLEVEL_OUTOF10: 7
PAINLEVEL_OUTOF10: 10
PAINLEVEL_OUTOF10: 8
PAINLEVEL_OUTOF10: 2
PAINLEVEL_OUTOF10: 2

## 2017-11-12 NOTE — PROGRESS NOTES
appears stated age and cooperative. HEENT: Pupils equal, round, and reactive to light. Conjunctivae/corneas clear. Neck: Supple, with full range of motion. No jugular venous distention. Trachea midline. Respiratory:  Normal respiratory effort. Clear to auscultation, bilaterally without Rales/Wheezes/Rhonchi. Cardiovascular: Regular rate and rhythm with normal S1/S2 without murmurs, rubs or gallops. Abdomen: Soft, non-tender, non-distended with normal bowel sounds. Musculoskeletal: No clubbing, cyanosis or edema bilaterally. Full range of motion without deformity. Skin: Skin color, texture, turgor normal.  No rashes or lesions.   Neurologic:  grossly non-focal.  Ext: no c/e/e    Labs:     Recent Results (from the past 24 hour(s))   POCT Glucose    Collection Time: 11/11/17 10:42 AM   Result Value Ref Range    POC Glucose 143 (H) 60 - 115 mg/dl    Performed on ACCU-CHEK    POCT Glucose    Collection Time: 11/11/17  3:48 PM   Result Value Ref Range    POC Glucose 149 (H) 60 - 115 mg/dl    Performed on ACCU-CHEK    POCT Glucose    Collection Time: 11/11/17  8:35 PM   Result Value Ref Range    POC Glucose 92 60 - 115 mg/dl    Performed on ACCU-CHEK    Basic Metabolic Panel    Collection Time: 11/12/17  5:19 AM   Result Value Ref Range    Sodium 131 (L) 132 - 144 mEq/L    Potassium 4.2 3.5 - 5.1 mEq/L    Chloride 97 (L) 98 - 107 mEq/L    CO2 22 22 - 29 mEq/L    Anion Gap 12 7 - 13 mEq/L    Glucose 85 74 - 109 mg/dL    BUN 8 6 - 20 mg/dL    CREATININE 0.82 0.70 - 1.20 mg/dL    GFR Non-African American >60.0 >60    GFR  >60.0 >60    Calcium 7.5 (L) 8.6 - 10.2 mg/dL   CBC Auto Differential    Collection Time: 11/12/17  5:19 AM   Result Value Ref Range    WBC 12.3 (H) 4.8 - 10.8 K/uL    RBC 3.37 (L) 4.70 - 6.10 M/uL    Hemoglobin 9.5 (L) 14.0 - 18.0 g/dL    Hematocrit 28.5 (L) 42.0 - 52.0 %    MCV 84.8 80.0 - 100.0 fL    MCH 28.4 27.0 - 31.3 pg    MCHC 33.4 33.0 - 37.0 %    RDW 13.4 11.5 - 14.5 %

## 2017-11-13 LAB
ANION GAP SERPL CALCULATED.3IONS-SCNC: 12 MEQ/L (ref 7–13)
BASOPHILS ABSOLUTE: 0.1 K/UL (ref 0–0.2)
BASOPHILS RELATIVE PERCENT: 0.9 %
BUN BLDV-MCNC: 10 MG/DL (ref 6–20)
CALCIUM SERPL-MCNC: 8 MG/DL (ref 8.6–10.2)
CHLORIDE BLD-SCNC: 96 MEQ/L (ref 98–107)
CO2: 24 MEQ/L (ref 22–29)
CREAT SERPL-MCNC: 0.95 MG/DL (ref 0.7–1.2)
EOSINOPHILS ABSOLUTE: 0.3 K/UL (ref 0–0.7)
EOSINOPHILS RELATIVE PERCENT: 2.1 %
GFR AFRICAN AMERICAN: >60
GFR NON-AFRICAN AMERICAN: >60
GLUCOSE BLD-MCNC: 118 MG/DL (ref 60–115)
GLUCOSE BLD-MCNC: 123 MG/DL (ref 60–115)
GLUCOSE BLD-MCNC: 258 MG/DL (ref 60–115)
GLUCOSE BLD-MCNC: 95 MG/DL (ref 74–109)
GLUCOSE BLD-MCNC: 99 MG/DL (ref 60–115)
HCT VFR BLD CALC: 30 % (ref 42–52)
HEMOGLOBIN: 10 G/DL (ref 14–18)
LYMPHOCYTES ABSOLUTE: 1.7 K/UL (ref 1–4.8)
LYMPHOCYTES RELATIVE PERCENT: 12.4 %
MCH RBC QN AUTO: 28.2 PG (ref 27–31.3)
MCHC RBC AUTO-ENTMCNC: 33.2 % (ref 33–37)
MCV RBC AUTO: 84.9 FL (ref 80–100)
MONOCYTES ABSOLUTE: 0.8 K/UL (ref 0.2–0.8)
MONOCYTES RELATIVE PERCENT: 6.1 %
NEUTROPHILS ABSOLUTE: 10.8 K/UL (ref 1.4–6.5)
NEUTROPHILS RELATIVE PERCENT: 78.5 %
PDW BLD-RTO: 13.6 % (ref 11.5–14.5)
PERFORMED ON: ABNORMAL
PERFORMED ON: NORMAL
PLATELET # BLD: 508 K/UL (ref 130–400)
POTASSIUM SERPL-SCNC: 4.4 MEQ/L (ref 3.5–5.1)
RBC # BLD: 3.53 M/UL (ref 4.7–6.1)
SODIUM BLD-SCNC: 132 MEQ/L (ref 132–144)
WBC # BLD: 13.7 K/UL (ref 4.8–10.8)

## 2017-11-13 PROCEDURE — 6370000000 HC RX 637 (ALT 250 FOR IP): Performed by: INTERNAL MEDICINE

## 2017-11-13 PROCEDURE — 80048 BASIC METABOLIC PNL TOTAL CA: CPT

## 2017-11-13 PROCEDURE — 2580000003 HC RX 258: Performed by: INTERNAL MEDICINE

## 2017-11-13 PROCEDURE — 99232 SBSQ HOSP IP/OBS MODERATE 35: CPT | Performed by: INTERNAL MEDICINE

## 2017-11-13 PROCEDURE — 97161 PT EVAL LOW COMPLEX 20 MIN: CPT

## 2017-11-13 PROCEDURE — 6360000002 HC RX W HCPCS: Performed by: INTERNAL MEDICINE

## 2017-11-13 PROCEDURE — 87040 BLOOD CULTURE FOR BACTERIA: CPT

## 2017-11-13 PROCEDURE — G8979 MOBILITY GOAL STATUS: HCPCS

## 2017-11-13 PROCEDURE — G8980 MOBILITY D/C STATUS: HCPCS

## 2017-11-13 PROCEDURE — 36415 COLL VENOUS BLD VENIPUNCTURE: CPT

## 2017-11-13 PROCEDURE — 2500000003 HC RX 250 WO HCPCS: Performed by: INTERNAL MEDICINE

## 2017-11-13 PROCEDURE — 2060000000 HC ICU INTERMEDIATE R&B

## 2017-11-13 PROCEDURE — 85025 COMPLETE CBC W/AUTO DIFF WBC: CPT

## 2017-11-13 PROCEDURE — G8978 MOBILITY CURRENT STATUS: HCPCS

## 2017-11-13 PROCEDURE — 2580000003 HC RX 258: Performed by: UROLOGY

## 2017-11-13 RX ORDER — INSULIN GLARGINE 100 [IU]/ML
30 INJECTION, SOLUTION SUBCUTANEOUS NIGHTLY
Status: DISCONTINUED | OUTPATIENT
Start: 2017-11-13 | End: 2017-11-14

## 2017-11-13 RX ADMIN — TAMSULOSIN HYDROCHLORIDE 0.4 MG: 0.4 CAPSULE ORAL at 08:54

## 2017-11-13 RX ADMIN — HYDROMORPHONE HYDROCHLORIDE 1 MG: 1 INJECTION, SOLUTION INTRAMUSCULAR; INTRAVENOUS; SUBCUTANEOUS at 02:29

## 2017-11-13 RX ADMIN — NAFCILLIN SODIUM: 2 INJECTION, POWDER, LYOPHILIZED, FOR SOLUTION INTRAMUSCULAR; INTRAVENOUS at 11:29

## 2017-11-13 RX ADMIN — SODIUM CHLORIDE: 9 INJECTION, SOLUTION INTRAVENOUS at 08:55

## 2017-11-13 RX ADMIN — PANTOPRAZOLE SODIUM 40 MG: 40 TABLET, DELAYED RELEASE ORAL at 06:19

## 2017-11-13 RX ADMIN — NAFCILLIN SODIUM: 2 INJECTION, POWDER, LYOPHILIZED, FOR SOLUTION INTRAMUSCULAR; INTRAVENOUS at 20:49

## 2017-11-13 RX ADMIN — HYDROMORPHONE HYDROCHLORIDE 1 MG: 1 INJECTION, SOLUTION INTRAMUSCULAR; INTRAVENOUS; SUBCUTANEOUS at 06:30

## 2017-11-13 RX ADMIN — ZOLPIDEM TARTRATE 5 MG: 5 TABLET ORAL at 22:54

## 2017-11-13 RX ADMIN — Medication 10 ML: at 20:49

## 2017-11-13 RX ADMIN — NAFCILLIN SODIUM: 2 INJECTION, POWDER, LYOPHILIZED, FOR SOLUTION INTRAMUSCULAR; INTRAVENOUS at 14:58

## 2017-11-13 RX ADMIN — Medication 10 ML: at 09:03

## 2017-11-13 RX ADMIN — INSULIN GLARGINE 30 UNITS: 100 INJECTION, SOLUTION SUBCUTANEOUS at 21:03

## 2017-11-13 RX ADMIN — NAFCILLIN SODIUM: 2 INJECTION, POWDER, LYOPHILIZED, FOR SOLUTION INTRAMUSCULAR; INTRAVENOUS at 02:09

## 2017-11-13 RX ADMIN — NAFCILLIN SODIUM: 2 INJECTION, POWDER, LYOPHILIZED, FOR SOLUTION INTRAMUSCULAR; INTRAVENOUS at 17:39

## 2017-11-13 RX ADMIN — HYDROMORPHONE HYDROCHLORIDE 1 MG: 1 INJECTION, SOLUTION INTRAMUSCULAR; INTRAVENOUS; SUBCUTANEOUS at 22:54

## 2017-11-13 RX ADMIN — HYDROMORPHONE HYDROCHLORIDE 1 MG: 1 INJECTION, SOLUTION INTRAMUSCULAR; INTRAVENOUS; SUBCUTANEOUS at 14:57

## 2017-11-13 RX ADMIN — NAFCILLIN SODIUM: 2 INJECTION, POWDER, LYOPHILIZED, FOR SOLUTION INTRAMUSCULAR; INTRAVENOUS at 06:19

## 2017-11-13 ASSESSMENT — PAIN SCALES - GENERAL
PAINLEVEL_OUTOF10: 8
PAINLEVEL_OUTOF10: 0
PAINLEVEL_OUTOF10: 8
PAINLEVEL_OUTOF10: 8
PAINLEVEL_OUTOF10: 10

## 2017-11-13 NOTE — PROGRESS NOTES
resulting canal narrowing at the C6-7 level noted. Dens atlas articulation within normal limits. Cervical cord is    normal in signal intensity.           Impression   PREVERTEBRAL SOFT TISSUE INFLAMMATORY CHANGES FROM APPROXIMATELY C3-C7.  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     FILI and regular echo showed no evidence of vegetation on the bowel is however he has persistent multiple positive blood cultures continue endocarditis a possible diagnosis he also has pyelonephritis significant infection of the kidney status post stent placement for obstruction    MRI consistent with spine infection discitis osteomyelitis and soft tissue infection undoubtedly related to the MSSA infection involving blood and kidney            Patient Active Problem List   Diagnosis    Acute pyelonephritis    Sepsis ([address]    Uncontrolled type 2 diabetes mellitus with complication, with long-term current use of insulin ([address]            ASSESSMENT:PLAN:  MSSA endocarditis  MSSA pyelonephritis   MSSA cervical discitis        Continue nafcillin  Blood cult still + 11/11/17 + MSSA    Needs repeat blood cultures once again  Condition at discharge from hospital blood cultures are sterilized

## 2017-11-13 NOTE — PROGRESS NOTES
and rhythm with normal S1/S2 without murmurs, rubs or gallops. Abdomen: Soft, non-tender, non-distended with normal bowel sounds. Slight right sided CVA tenderness; none on the left. Musculoskeletal: No clubbing, cyanosis or edema bilaterally. Neuro: Non Focal. Intact and symmetric  Psychiatric: Alert and oriented, thought content appropriate, normal insight  Capillary Refill: Brisk,< 3 seconds   Peripheral Pulses: +2 palpable, equal bilaterally     Labs:   Recent Labs      11/11/17 0550 11/12/17 0519 11/13/17 0534   WBC  12.5*  12.3*  13.7*   HGB  10.5*  9.5*  10.0*   HCT  31.1*  28.5*  30.0*   PLT  385  406*  508*     Recent Labs      11/11/17 0550 11/12/17 0519 11/13/17 0534   NA  134  131*  132   K  4.5  4.2  4.4   CL  99  97*  96*   CO2  23  22  24   BUN  8  8  10   CREATININE  0.91  0.82  0.95   CALCIUM  7.3*  7.5*  8.0*     No results for input(s): AST, ALT, BILIDIR, BILITOT, ALKPHOS in the last 72 hours. No results for input(s): INR in the last 72 hours. No results for input(s): Chayo Stephanie in the last 72 hours. Urinalysis:    Lab Results   Component Value Date    NITRU POSITIVE 11/05/2017    WBCUA >100 11/05/2017    BACTERIA Many 11/05/2017    RBCUA 20-50 11/05/2017    BLOODU LARGE 11/05/2017    SPECGRAV 1.027 11/05/2017    GLUCOSEU >=1000 11/05/2017     Radiology:  MRI CERVICAL SPINE W WO CONTRAST   Final Result   PREVERTEBRAL SOFT TISSUE INFLAMMATORY CHANGES FROM APPROXIMATELY C3-C7. 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 FL Less Than 1 Hour   Final Result      CT ABDOMEN PELVIS WO IV CONTRAST Additional Contrast? None   Final Result      Persistent 2 mm calculus at right ureterovesical junction with mild right hydroureter and enlarged right kidney. No right nephrolithiasis or hydronephrosis.  Right perinephric fat inpatient; continue SSI and Lantus    Active Hospital Problems    Diagnosis Date Noted    Staphylococcus aureus bacteremia with sepsis ([facility] [A41.01]     Hydronephrosis with urinary obstruction due to ureteral calculus [N13.2]     Acute pyelonephritis [N10]     Sepsis ([facility] [A41.9]     Uncontrolled type 2 diabetes mellitus with complication, with long-term current use of insulin ([facility]CC) [E11.8, E11.65, Z79.4]     MSSA (methicillin susceptible Staphylococcus aureus) septicemia ([facility] [A41.01]      Additional work up or/and treatment plan may be added today or then after based on clinical progression. I am managing a portion of pt care. Some medical issues are handled by other specialists. Additional work up and treatment should be done in out pt setting by pt PCP and other out pt providers. In addition to examining and evaluating pt, I spent additional time explaining care, normal and abnormal findings, and treatment plan. All of pt questions were answered. Counseling, diet and education were  provided. Case will be discussed with nursing staff when appropriate. Family will be updated if and when appropriate.       Diet: DIET CARB CONTROL; Carb Control: 5 carbs/meal (approximate 2000 kcals/day)    Code Status: Full Code    PT/OT Eval     Electronically signed by Dereje Wesley MD on 11/13/2017 at 12:46 PM

## 2017-11-13 NOTE — PROGRESS NOTES
Physical Therapy Med Surg Initial Assessment  Facility/Department: [address]  [id number]       NAME: Melina Oconnor  : 1965 (49 y.o.)  MRN: [id number]  CODE STATUS: Full Code    Date of Service: 2017    Patient Diagnosis(es): DKA, type 2, not at goal [facility]) [E13.10]   Chief Complaint   Patient presents with    Hematuria     started urinating blood 4 days ago, body hurts today, took [id number]  2 hrs ago     Patient Active Problem List    Diagnosis Date Noted    Staphylococcus aureus bacteremia with sepsis ([facility]     Hydronephrosis with urinary obstruction due to ureteral calculus     Acute pyelonephritis     Sepsis ([facility]     Uncontrolled type 2 diabetes mellitus with complication, with long-term current use of insulin ([facility]     [facility]A [facility]icillin susceptible St[facility] aureus) [facility]         History reviewed. No pertinent past medical history. Past Surgical History:   Procedure Laterality Date    CYSTOSCOPY INSERTION / REMOVAL STENT / STONE Right 2017    CYSTOSCOPY RIGHT DOUBLE J  STENT INSERTION performed by Muriel Lorenzo MD at [address] [place] [address]            Restrictions: Body mass index is 26.83 kg/m². SUBJECTIVE: Subjective: Agreeable to PT evaluation.  Denies any mobility or safety concerns  Pre Treatment Pain Screening  Pain at present:  (unrated soreness in back)  Intervention List: Patient able to continue with treatment    Post Treatment Pain Screening:   Pain Screening  Patient Currently in Pain: Yes  Pain Assessment  Pain Level:  (unrated soreness in back)    Prior Level of Function:  Social/Functional History  Lives With: Spouse  Type of Home: House  Home Layout: Two level  Home Access: Stairs to enter with rails  Entrance Stairs - Number of Steps: 4  Home Equipment:  (none)  ADL Assistance: Independent  Homemaking Assistance: Independent  Ambulation Assistance: Independent  Transfer Assistance: Independent    OBJECTIVE:

## 2017-11-13 NOTE — PROGRESS NOTES
Endocrinology Progress Note    Assessment and Plan:   Assessment-  1. Hyperglycemia  2. New Diagnosis type 2 insulin-dependent diabetes  3. Left kidney stone  4. Sepsis        Plan-  1. Monitor glycemic control closely  2. Diabetic education  3. Repeat blood cultures positive  4. Antibiotics per ID  5. Patient may be discharged home from endocrinologic standpoint, follow-up in 2 weeks    POC Glucose:   Recent Labs      11/12/17   1050  11/12/17   1624  11/12/17   2047  11/13/17   0623  11/13/17   1100   POCGLU  145*  122*  110  118*  99     HGBA1C:  No results for input(s): LABA1C in the last 72 hours. CBC:   Recent Labs      11/12/17   0519  11/13/17   0534   WBC  12.3*  13.7*   HGB  9.5*  10.0*   PLT  406*  508*     CMP:    Recent Labs      11/11/17   0550  11/12/17   0519  11/13/17   0534   NA  134  131*  132   K  4.5  4.2  4.4   CL  99  97*  96*   CO2  23  22  24   BUN  8  8  10   CREATININE  0.91  0.82  0.95   GLUCOSE  175*  85  95   CALCIUM  7.3*  7.5*  8.0*   LABGLOM  >60.0  >60.0  >60.0         CC:   Chief Complaint   Patient presents with    Hematuria     started urinating blood 4 days ago, body hurts today, took [id number]  2 hrs ago       Subjective: Interval History: Patient is a 60-year-old man admitted for hematuria and left-sided flank and abdominal pain. Diagnosed with a right renal calculi and is undergoing a J stent placement via cystoscopy today. He was found to be hyperglycemic hemoglobin A1c was elevated and he was diagnosed with new onset diabetes. Glycemic control is improved. He is okay to discharge from an endocrine standpoint. Diabetic education has been ordered and done.     Review of systems: denies polyuria, polydipsia, ABD pain, flank pain, N/V/D, or diaphoresis  Medications:   Scheduled Meds:   IVPB builder   Intravenous Q4H    insulin glargine  45 Units Subcutaneous Nightly    insulin lispro  16 Units Subcutaneous TID WC    sodium chloride flush  10 mL Intravenous 2

## 2017-11-14 ENCOUNTER — APPOINTMENT (OUTPATIENT)
Dept: GENERAL RADIOLOGY | Age: 52
DRG: 720 | End: 2017-11-14
Payer: MEDICAID

## 2017-11-14 LAB
ANION GAP SERPL CALCULATED.3IONS-SCNC: 14 MEQ/L (ref 7–13)
BASOPHILS ABSOLUTE: 0.1 K/UL (ref 0–0.2)
BASOPHILS RELATIVE PERCENT: 0.8 %
BLOOD CULTURE, ROUTINE: ABNORMAL
BLOOD CULTURE, ROUTINE: ABNORMAL
BUN BLDV-MCNC: 8 MG/DL (ref 6–20)
CALCIUM SERPL-MCNC: 7.8 MG/DL (ref 8.6–10.2)
CHLORIDE BLD-SCNC: 97 MEQ/L (ref 98–107)
CO2: 23 MEQ/L (ref 22–29)
CREAT SERPL-MCNC: 0.96 MG/DL (ref 0.7–1.2)
CULTURE, BLOOD 2: ABNORMAL
CULTURE, BLOOD 2: ABNORMAL
EOSINOPHILS ABSOLUTE: 0.3 K/UL (ref 0–0.7)
EOSINOPHILS RELATIVE PERCENT: 1.9 %
GFR AFRICAN AMERICAN: >60
GFR NON-AFRICAN AMERICAN: >60
GLUCOSE BLD-MCNC: 158 MG/DL (ref 60–115)
GLUCOSE BLD-MCNC: 248 MG/DL (ref 60–115)
GLUCOSE BLD-MCNC: 265 MG/DL (ref 60–115)
GLUCOSE BLD-MCNC: 375 MG/DL (ref 60–115)
GLUCOSE BLD-MCNC: 382 MG/DL (ref 60–115)
GLUCOSE BLD-MCNC: 421 MG/DL (ref 60–115)
GLUCOSE BLD-MCNC: 91 MG/DL (ref 74–109)
HCT VFR BLD CALC: 29.8 % (ref 42–52)
HEMOGLOBIN: 9.9 G/DL (ref 14–18)
LYMPHOCYTES ABSOLUTE: 1.5 K/UL (ref 1–4.8)
LYMPHOCYTES RELATIVE PERCENT: 11.7 %
MCH RBC QN AUTO: 28.4 PG (ref 27–31.3)
MCHC RBC AUTO-ENTMCNC: 33.3 % (ref 33–37)
MCV RBC AUTO: 85.4 FL (ref 80–100)
MONOCYTES ABSOLUTE: 0.7 K/UL (ref 0.2–0.8)
MONOCYTES RELATIVE PERCENT: 5.3 %
NEUTROPHILS ABSOLUTE: 10.5 K/UL (ref 1.4–6.5)
NEUTROPHILS RELATIVE PERCENT: 80.3 %
ORGANISM: ABNORMAL
ORGANISM: ABNORMAL
PDW BLD-RTO: 13.5 % (ref 11.5–14.5)
PERFORMED ON: ABNORMAL
PLATELET # BLD: 491 K/UL (ref 130–400)
POTASSIUM SERPL-SCNC: 3.9 MEQ/L (ref 3.5–5.1)
RBC # BLD: 3.49 M/UL (ref 4.7–6.1)
SODIUM BLD-SCNC: 134 MEQ/L (ref 132–144)
WBC # BLD: 13 K/UL (ref 4.8–10.8)

## 2017-11-14 PROCEDURE — 85025 COMPLETE CBC W/AUTO DIFF WBC: CPT

## 2017-11-14 PROCEDURE — 36415 COLL VENOUS BLD VENIPUNCTURE: CPT

## 2017-11-14 PROCEDURE — 2500000003 HC RX 250 WO HCPCS: Performed by: INTERNAL MEDICINE

## 2017-11-14 PROCEDURE — 80048 BASIC METABOLIC PNL TOTAL CA: CPT

## 2017-11-14 PROCEDURE — 6360000002 HC RX W HCPCS: Performed by: INTERNAL MEDICINE

## 2017-11-14 PROCEDURE — 2580000003 HC RX 258: Performed by: UROLOGY

## 2017-11-14 PROCEDURE — 6370000000 HC RX 637 (ALT 250 FOR IP): Performed by: INTERNAL MEDICINE

## 2017-11-14 PROCEDURE — 99232 SBSQ HOSP IP/OBS MODERATE 35: CPT | Performed by: INTERNAL MEDICINE

## 2017-11-14 PROCEDURE — 99231 SBSQ HOSP IP/OBS SF/LOW 25: CPT | Performed by: PHYSICIAN ASSISTANT

## 2017-11-14 PROCEDURE — 99231 SBSQ HOSP IP/OBS SF/LOW 25: CPT | Performed by: INTERNAL MEDICINE

## 2017-11-14 PROCEDURE — 2060000000 HC ICU INTERMEDIATE R&B

## 2017-11-14 PROCEDURE — 2580000003 HC RX 258: Performed by: INTERNAL MEDICINE

## 2017-11-14 PROCEDURE — 74000 XR ABDOMEN LIMITED (KUB): CPT

## 2017-11-14 RX ORDER — INSULIN GLARGINE 100 [IU]/ML
35 INJECTION, SOLUTION SUBCUTANEOUS NIGHTLY
Status: DISCONTINUED | OUTPATIENT
Start: 2017-11-14 | End: 2017-11-16

## 2017-11-14 RX ADMIN — NAFCILLIN SODIUM: 2 INJECTION, POWDER, LYOPHILIZED, FOR SOLUTION INTRAMUSCULAR; INTRAVENOUS at 13:40

## 2017-11-14 RX ADMIN — Medication 10 ML: at 08:19

## 2017-11-14 RX ADMIN — NAFCILLIN SODIUM: 2 INJECTION, POWDER, LYOPHILIZED, FOR SOLUTION INTRAMUSCULAR; INTRAVENOUS at 06:11

## 2017-11-14 RX ADMIN — ZOLPIDEM TARTRATE 5 MG: 5 TABLET ORAL at 23:17

## 2017-11-14 RX ADMIN — NAFCILLIN SODIUM: 2 INJECTION, POWDER, LYOPHILIZED, FOR SOLUTION INTRAMUSCULAR; INTRAVENOUS at 20:00

## 2017-11-14 RX ADMIN — NAFCILLIN SODIUM: 2 INJECTION, POWDER, LYOPHILIZED, FOR SOLUTION INTRAMUSCULAR; INTRAVENOUS at 16:00

## 2017-11-14 RX ADMIN — NAFCILLIN SODIUM: 2 INJECTION, POWDER, LYOPHILIZED, FOR SOLUTION INTRAMUSCULAR; INTRAVENOUS at 09:50

## 2017-11-14 RX ADMIN — PANTOPRAZOLE SODIUM 40 MG: 40 TABLET, DELAYED RELEASE ORAL at 08:19

## 2017-11-14 RX ADMIN — INSULIN GLARGINE 35 UNITS: 100 INJECTION, SOLUTION SUBCUTANEOUS at 21:40

## 2017-11-14 RX ADMIN — ACETAMINOPHEN 650 MG: 325 TABLET ORAL at 19:59

## 2017-11-14 RX ADMIN — NAFCILLIN SODIUM: 2 INJECTION, POWDER, LYOPHILIZED, FOR SOLUTION INTRAMUSCULAR; INTRAVENOUS at 01:57

## 2017-11-14 RX ADMIN — HYDROMORPHONE HYDROCHLORIDE 1 MG: 1 INJECTION, SOLUTION INTRAMUSCULAR; INTRAVENOUS; SUBCUTANEOUS at 08:11

## 2017-11-14 RX ADMIN — HYDROMORPHONE HYDROCHLORIDE 1 MG: 1 INJECTION, SOLUTION INTRAMUSCULAR; INTRAVENOUS; SUBCUTANEOUS at 23:17

## 2017-11-14 RX ADMIN — HYDROMORPHONE HYDROCHLORIDE 1 MG: 1 INJECTION, SOLUTION INTRAMUSCULAR; INTRAVENOUS; SUBCUTANEOUS at 16:00

## 2017-11-14 RX ADMIN — Medication 10 ML: at 20:01

## 2017-11-14 RX ADMIN — TAMSULOSIN HYDROCHLORIDE 0.4 MG: 0.4 CAPSULE ORAL at 08:19

## 2017-11-14 ASSESSMENT — PAIN SCALES - GENERAL
PAINLEVEL_OUTOF10: 7
PAINLEVEL_OUTOF10: 7
PAINLEVEL_OUTOF10: 8
PAINLEVEL_OUTOF10: 8
PAINLEVEL_OUTOF10: 6

## 2017-11-14 NOTE — PROGRESS NOTES
chloride flush  10 mL Intravenous 2 times per day    pantoprazole  40 mg Oral QAM AC    insulin lispro  0-6 Units Subcutaneous TID WC    insulin lispro  0-3 Units Subcutaneous Nightly    insulin regular  0.1 Units/kg Intravenous Once    tamsulosin  0.4 mg Oral Daily     Continuous Infusions:   dextrose 100 mL/hr (11/07/17 1544)    dextrose 5 % and 0.45 % NaCl 150 mL/hr at 11/06/17 0728       Objective:   Vitals: /81   Pulse 97   Temp 98.1 °F (36.7 °C) (Oral)   Resp 18   Ht 5' 6\" (1.676 m)   Wt 166 lb 3.2 oz (75.4 kg)   SpO2 100%   BMI 26.83 kg/m²    Wt Readings from Last 3 Encounters:   11/13/17 166 lb 3.2 oz (75.4 kg)        General appearance: alert, appears stated age, cooperative and no distress  Skin: Skin color, texture, turgor normal. No rashes or lesions. Neck: no lymphadenopathy  Lungs: clear to auscultation bilaterally  Heart: regular rate and rhythm, S1, S2 normal, no murmur, click, rub or gallop  Abdomen: soft, non-tender. Bowel sounds normal. No masses,  no organomegaly.   Extremities: extremities normal, atraumatic, no cyanosis or edema    Patient Active Problem List:     Acute pyelonephritis     Sepsis ([address]     Uncontrolled type 2 diabetes mellitus with complication, with long-term current use of insulin ([address]     MSSA (methicillin susceptible Staphylococcus aureus) septicemia ([facility]     Acute bacterial endocarditis            Electronically signed by KATHY Rivera on 11/14/2017 at 1:46 PM

## 2017-11-14 NOTE — PROGRESS NOTES
11/06/17 0728       CBC:   Recent Labs      11/13/17   0534  11/14/17   0528   WBC  13.7*  13.0*   HGB  10.0*  9.9*   PLT  508*  491*     CMP:  Recent Labs      11/12/17   0519  11/13/17   0534  11/14/17   0528   NA  131*  132  134   K  4.2  4.4  3.9   CL  97*  96*  97*   CO2  22  24  23   BUN  8  10  8   CREATININE  0.82  0.95  0.96   GLUCOSE  85  95  91   CALCIUM  7.5*  8.0*  7.8*   LABGLOM  >60.0  >60.0  >60.0       Objective:   Vitals: /81   Pulse 97   Temp 98.1 °F (36.7 °C) (Oral)   Resp 18   Ht 5' 6\" (1.676 m)   Wt 166 lb 3.2 oz (75.4 kg)   SpO2 100%   BMI 26.83 kg/m²    Wt Readings from Last 3 Encounters:   11/13/17 166 lb 3.2 oz (75.4 kg)      24HR INTAKE/OUTPUT:    Intake/Output Summary (Last 24 hours) at 11/14/17 1335  Last data filed at 11/14/17 0858   Gross per 24 hour   Intake             1240 ml   Output             4000 ml   Net            -2760 ml        Urine Culture   Lab Results   Component Value Date    LABURIN 50,000 CFU/ml  PBP2= Negative   11/08/2017       General appearance: alert, appears stated age, cooperative and no distress  Lungs: clear to auscultation bilaterally  Heart: regular rate and rhythm, S1, S2 normal, no murmur, click, rub or gallop  Abdomen: soft, non-tender. Bowel sounds normal. No masses,  no organomegaly.  no bladder distension noted  Extremities: extremities normal, atraumatic, no cyanosis or edema  Genitourinary: normal male, testes descended bilaterally, no inguinal hernia, no hydrocele, Quijano catheter in place          Electronically signed by KATHY Leong on 11/14/2017 at 1:35 PM

## 2017-11-14 NOTE — PROGRESS NOTES
Hospitalist Progress Note      PCP: No primary care provider on file. Date of Admission: 11/5/2017    Chief Complaint:    Chief Complaint   Patient presents with    Hematuria     started urinating blood 4 days ago, body hurts today, took [id number]  2 hrs ago     Subjective:  Patient states that he has not had fevers, chills or night sweats; denies diarrhea. 10 point ROS negative other than mentioned above     Medications:  Reviewed    Infusion Medications    dextrose 100 mL/hr (11/07/17 1544)    dextrose 5 % and 0.45 % NaCl 150 mL/hr at 11/06/17 0728     Scheduled Medications    insulin glargine  30 Units Subcutaneous Nightly    insulin lispro  10 Units Subcutaneous TID WC    IVPB builder   Intravenous Q4H    sodium chloride flush  10 mL Intravenous 2 times per day    pantoprazole  40 mg Oral QAM AC    insulin lispro  0-6 Units Subcutaneous TID WC    insulin lispro  0-3 Units Subcutaneous Nightly    insulin regular  0.1 Units/kg Intravenous Once    tamsulosin  0.4 mg Oral Daily     PRN Meds: HYDROmorphone, zolpidem, sodium chloride flush, ondansetron, acetaminophen, calcium carbonate, glucose, glucagon (rDNA), dextrose, dextrose, dextrose 5 % and 0.45 % NaCl      Intake/Output Summary (Last 24 hours) at 11/14/17 1333  Last data filed at 11/14/17 0858   Gross per 24 hour   Intake             1240 ml   Output             4000 ml   Net            -2760 ml     Exam:    /81   Pulse 97   Temp 98.1 °F (36.7 °C) (Oral)   Resp 18   Ht 5' 6\" (1.676 m)   Wt 166 lb 3.2 oz (75.4 kg)   SpO2 100%   BMI 26.83 kg/m²     General appearance: No apparent distress, appears stated age and cooperative. HEENT: Pupils equal, round, and reactive to light. Conjunctivae/corneas clear. Poor dentition. Neck: Supple, with full range of motion. No jugular venous distention. Trachea midline. Respiratory:  Normal respiratory effort.  Clear to auscultation, bilaterally without ureterovesical junction with mild right hydroureter and enlarged right kidney. No right nephrolithiasis or hydronephrosis. Right perinephric fat stranding and fluid has mildly increased since the prior study. Interval development of small bilateral pleural effusions and small bibasilar foci of dependent atelectasis/pneumonia. Splenomegaly. Sigmoid diverticulosis. Fat-containing left inguinal hernia, with no CT evidence of incarceration or obstruction. All CT scans at this facility use dose modulation, iterative reconstruction, and/or weight based dosing when appropriate to reduce radiation dose to as low as reasonably achievable. CT ABDOMEN PELVIS WO IV CONTRAST Additional Contrast? None   Final Result   MILDLY ENLARGED RIGHT KIDNEY WITH RIGHT PERINEPHRIC STRANDING. 2 MM CALCULUS EITHER WITHIN THE RIGHT URETEROVESICAL JUNCTION OR BLADDER. SOFT TISSUE DENSITY AND FAT AT THE POSTERIOR INFERIOR ASPECT OF THE RIGHT KIDNEY, POSSIBLE EXTRAVASATED    URINE. THE RIGHT KIDNEY BORDERS ARE ILL-DEFINED, POSSIBLE KIDNEY INFECTION. NO DEFINITE RIGHT-SIDED HYDRONEPHROSIS. 15 MM PLEURAL-BASED NODULE RIGHT LOWER LOBE. RIGHT BASILAR ATELECTASIS. MILD SPLENOMEGALY. LEFT INGUINAL HERNIA CONTAINING FAT. All CT scans at this facility use dose modulation, iterative reconstruction, and/or weight based dosing when appropriate to reduce radiation dose to as low as reasonably achievable. Assessment/Plan:  50 y/o M with a PMHx of DMII who presented with low back pain; found to be septic and developing DKA. He has a MSSA bactermia likely from pyelonephritis but he has had persistently positive BC which is concerning for possible endocarditis.      #Sepsis secondary to MSSA bacteremia secondary to pyelonephritis and spine infection discitis osteomyelitis and soft tissue infection likely due to the same with persistently positive BC concerning for endocarditis      - Although FILI was negative still concerning given how his [facility] are still positive     - Appreciating ID recommendations     - Waiting for [facility] drawn yesterday; potentially may be able to place a PICC tomorrow afternoon/evening if still negative    #DMII     - continue SSI and Lantus per endocrine recommendations    Active Hospital Problems    Diagnosis Date Noted    Staphylococcus aureus bacteremia with sepsis ([facility] [A41.01]     Hydronephrosis with urinary obstruction due to ureteral calculus [N13.2]     Acute pyelonephritis [N10]     Sepsis ([address] [A41.9]     Uncontrolled type 2 diabetes mellitus with complication, with long-term current use of insulin ([facility] [E11.8, E11.65, Z79.4]     MSSA (methicillin susceptible Staphylococcus aureus) septicemia ([facility] [A41.01]      Additional work up or/and treatment plan may be added today or then after based on clinical progression. I am managing a portion of pt care. Some medical issues are handled by other specialists. Additional work up and treatment should be done in out pt setting by pt PCP and other out pt providers. In addition to examining and evaluating pt, I spent additional time explaining care, normal and abnormal findings, and treatment plan. All of pt questions were answered. Counseling, diet and education were  provided. Case will be discussed with nursing staff when appropriate. Family will be updated if and when appropriate.       Diet: DIET CARB CONTROL; Carb Control: 5 carbs/meal (approximate 2000 kcals/day)    Code Status: Full Code    PT/OT Eval     Electronically signed by Traci Cherry MD on 11/14/2017 at 1:33 PM

## 2017-11-15 LAB
ANION GAP SERPL CALCULATED.3IONS-SCNC: 13 MEQ/L (ref 7–13)
BASOPHILS ABSOLUTE: 0.1 K/UL (ref 0–0.2)
BASOPHILS RELATIVE PERCENT: 1 %
BLOOD CULTURE, ROUTINE: NORMAL
BUN BLDV-MCNC: 9 MG/DL (ref 6–20)
CALCIUM SERPL-MCNC: 7.9 MG/DL (ref 8.6–10.2)
CHLORIDE BLD-SCNC: 97 MEQ/L (ref 98–107)
CO2: 23 MEQ/L (ref 22–29)
CREAT SERPL-MCNC: 1.04 MG/DL (ref 0.7–1.2)
EOSINOPHILS ABSOLUTE: 0.3 K/UL (ref 0–0.7)
EOSINOPHILS RELATIVE PERCENT: 2.8 %
GFR AFRICAN AMERICAN: >60
GFR NON-AFRICAN AMERICAN: >60
GLUCOSE BLD-MCNC: 136 MG/DL (ref 60–115)
GLUCOSE BLD-MCNC: 141 MG/DL (ref 60–115)
GLUCOSE BLD-MCNC: 149 MG/DL (ref 74–109)
GLUCOSE BLD-MCNC: 216 MG/DL (ref 60–115)
GLUCOSE BLD-MCNC: 235 MG/DL (ref 60–115)
HCT VFR BLD CALC: 27 % (ref 42–52)
HEMOGLOBIN: 9.1 G/DL (ref 14–18)
LYMPHOCYTES ABSOLUTE: 1.5 K/UL (ref 1–4.8)
LYMPHOCYTES RELATIVE PERCENT: 14.8 %
MCH RBC QN AUTO: 28.7 PG (ref 27–31.3)
MCHC RBC AUTO-ENTMCNC: 33.7 % (ref 33–37)
MCV RBC AUTO: 85.2 FL (ref 80–100)
MONOCYTES ABSOLUTE: 0.6 K/UL (ref 0.2–0.8)
MONOCYTES RELATIVE PERCENT: 6.2 %
NEUTROPHILS ABSOLUTE: 7.7 K/UL (ref 1.4–6.5)
NEUTROPHILS RELATIVE PERCENT: 75.2 %
PDW BLD-RTO: 13.9 % (ref 11.5–14.5)
PERFORMED ON: ABNORMAL
PLATELET # BLD: 469 K/UL (ref 130–400)
POTASSIUM SERPL-SCNC: 4.3 MEQ/L (ref 3.5–5.1)
RBC # BLD: 3.17 M/UL (ref 4.7–6.1)
SODIUM BLD-SCNC: 133 MEQ/L (ref 132–144)
WBC # BLD: 10.2 K/UL (ref 4.8–10.8)

## 2017-11-15 PROCEDURE — 2060000000 HC ICU INTERMEDIATE R&B

## 2017-11-15 PROCEDURE — 2500000003 HC RX 250 WO HCPCS: Performed by: INTERNAL MEDICINE

## 2017-11-15 PROCEDURE — 6370000000 HC RX 637 (ALT 250 FOR IP): Performed by: INTERNAL MEDICINE

## 2017-11-15 PROCEDURE — 2580000003 HC RX 258: Performed by: INTERNAL MEDICINE

## 2017-11-15 PROCEDURE — 99231 SBSQ HOSP IP/OBS SF/LOW 25: CPT | Performed by: INTERNAL MEDICINE

## 2017-11-15 PROCEDURE — 85025 COMPLETE CBC W/AUTO DIFF WBC: CPT

## 2017-11-15 PROCEDURE — 36415 COLL VENOUS BLD VENIPUNCTURE: CPT

## 2017-11-15 PROCEDURE — 2580000003 HC RX 258: Performed by: UROLOGY

## 2017-11-15 PROCEDURE — 80048 BASIC METABOLIC PNL TOTAL CA: CPT

## 2017-11-15 PROCEDURE — 99232 SBSQ HOSP IP/OBS MODERATE 35: CPT | Performed by: INTERNAL MEDICINE

## 2017-11-15 PROCEDURE — 6360000002 HC RX W HCPCS: Performed by: INTERNAL MEDICINE

## 2017-11-15 RX ADMIN — ACETAMINOPHEN 650 MG: 325 TABLET ORAL at 14:58

## 2017-11-15 RX ADMIN — INSULIN GLARGINE 35 UNITS: 100 INJECTION, SOLUTION SUBCUTANEOUS at 21:46

## 2017-11-15 RX ADMIN — NAFCILLIN SODIUM: 2 INJECTION, POWDER, LYOPHILIZED, FOR SOLUTION INTRAMUSCULAR; INTRAVENOUS at 00:34

## 2017-11-15 RX ADMIN — HYDROMORPHONE HYDROCHLORIDE 1 MG: 1 INJECTION, SOLUTION INTRAMUSCULAR; INTRAVENOUS; SUBCUTANEOUS at 10:49

## 2017-11-15 RX ADMIN — NAFCILLIN SODIUM: 2 INJECTION, POWDER, LYOPHILIZED, FOR SOLUTION INTRAMUSCULAR; INTRAVENOUS at 04:32

## 2017-11-15 RX ADMIN — NAFCILLIN SODIUM: 2 INJECTION, POWDER, LYOPHILIZED, FOR SOLUTION INTRAMUSCULAR; INTRAVENOUS at 21:12

## 2017-11-15 RX ADMIN — PANTOPRAZOLE SODIUM 40 MG: 40 TABLET, DELAYED RELEASE ORAL at 05:31

## 2017-11-15 RX ADMIN — HYDROMORPHONE HYDROCHLORIDE 1 MG: 1 INJECTION, SOLUTION INTRAMUSCULAR; INTRAVENOUS; SUBCUTANEOUS at 04:35

## 2017-11-15 RX ADMIN — HYDROMORPHONE HYDROCHLORIDE 1 MG: 1 INJECTION, SOLUTION INTRAMUSCULAR; INTRAVENOUS; SUBCUTANEOUS at 21:50

## 2017-11-15 RX ADMIN — Medication 10 ML: at 10:11

## 2017-11-15 RX ADMIN — NAFCILLIN SODIUM: 2 INJECTION, POWDER, LYOPHILIZED, FOR SOLUTION INTRAMUSCULAR; INTRAVENOUS at 09:19

## 2017-11-15 RX ADMIN — NAFCILLIN SODIUM: 2 INJECTION, POWDER, LYOPHILIZED, FOR SOLUTION INTRAMUSCULAR; INTRAVENOUS at 16:08

## 2017-11-15 RX ADMIN — TAMSULOSIN HYDROCHLORIDE 0.4 MG: 0.4 CAPSULE ORAL at 21:12

## 2017-11-15 ASSESSMENT — PAIN DESCRIPTION - PAIN TYPE: TYPE: ACUTE PAIN

## 2017-11-15 ASSESSMENT — PAIN SCALES - GENERAL
PAINLEVEL_OUTOF10: 9
PAINLEVEL_OUTOF10: 5
PAINLEVEL_OUTOF10: 0
PAINLEVEL_OUTOF10: 7
PAINLEVEL_OUTOF10: 8
PAINLEVEL_OUTOF10: 2
PAINLEVEL_OUTOF10: 0

## 2017-11-15 ASSESSMENT — PAIN DESCRIPTION - ORIENTATION: ORIENTATION: MID

## 2017-11-15 ASSESSMENT — PAIN DESCRIPTION - LOCATION: LOCATION: NECK

## 2017-11-15 ASSESSMENT — PAIN DESCRIPTION - DESCRIPTORS: DESCRIPTORS: ACHING

## 2017-11-15 NOTE — PROGRESS NOTES
Pt and wife verbalized understanding for insulin and accu checks, pt wife administering insulin, with nurse watching, wife instructed to perform accu checks, they are very receptive to learning and doing, pt calm and cooperative with care, denies pain at this time, call light within reach, skin warm pink and dry, pt eating and drinking without issues, denies N&V, SOB. No further BM at this time.

## 2017-11-15 NOTE — PROGRESS NOTES
Endocrinology Progress Note    Assessment and Plan:   Assessment-  1. Hyperglycemia  2. New Diagnosis type 2 insulin-dependent diabetes  3. Left kidney stone  4. Sepsis  5. Febrile last night with hyperglycemia        Plan-  1. Monitor glycemic control closely  2. Increased Humalog to 14u 3 times a day with meals  3. Repeat blood cultures positive  4. Antibiotics per ID  POC Glucose:   Recent Labs      11/14/17   1558  11/14/17   2106  11/14/17   2107  11/14/17   2108  11/15/17   0617   POCGLU  265*  382*  421*  375*  136*     HGBA1C:  No results for input(s): LABA1C in the last 72 hours. CBC:   Recent Labs      11/14/17   0528  11/15/17   0531   WBC  13.0*  10.2   HGB  9.9*  9.1*   PLT  491*  469*     CMP:    Recent Labs      11/13/17   0534  11/14/17   0528  11/15/17   0531   NA  132  134  133   K  4.4  3.9  4.3   CL  96*  97*  97*   CO2  24  23  23   BUN  10  8  9   CREATININE  0.95  0.96  1.04   GLUCOSE  95  91  149*   CALCIUM  8.0*  7.8*  7.9*   LABGLOM  >60.0  >60.0  >60.0         CC:   Chief Complaint   Patient presents with    Hematuria     started urinating blood 4 days ago, body hurts today, took [id number]  2 hrs ago       Subjective: Interval History: Patient is a 52-year-old man admitted for hematuria and left-sided flank and abdominal pain. Diagnosed with a right renal calculi and is undergoing a J stent placement via cystoscopy today. He was found to be hyperglycemic hemoglobin A1c was elevated and he was diagnosed with new onset diabetes. Patient had episode of hyperglycemia last night, he was also noted to be febrile with 101.1°F temperature.   Tylenol given with relief    Review of systems: denies polyuria, polydipsia, ABD pain, flank pain, N/V/D, or diaphoresis  Medications:   Scheduled Meds:   insulin lispro  14 Units Subcutaneous TID WC    insulin glargine  35 Units Subcutaneous Nightly    IVPB builder   Intravenous Q4H    sodium chloride flush  10 mL Intravenous 2 times per day

## 2017-11-15 NOTE — CARE COORDINATION
Called and spoke w Berta Eldridge from [employer] concerning status of Medicaid rachelle. She said she will call and speak with them. I spoke w pt and his wife telling them that we are following him daily and as of yet, have not heard about his Medicaid. Still uncertain if he will be able to go to a once a day IV abx. Left a note for Dr Leann Romero on 11/10/2017 making him aware of the concerns. C3 will follow. ADDENDUM: Medicaid Pending # [id number]    I spoke w Dr Leann Romero making him aware of the insurance situation and he said pt will be here for a while longer, needing the finals on the blood cx, and that pt will be dc'd on Nafcillin Q4HR. Made Elzbieta from [place] [id number] and Nora diallo. Will send in IV check. C3 will follow.

## 2017-11-15 NOTE — PROGRESS NOTES
resulting canal narrowing at the C6-7 level noted. Dens atlas articulation within normal limits. Cervical cord is    normal in signal intensity.           Impression   PREVERTEBRAL SOFT TISSUE INFLAMMATORY CHANGES FROM APPROXIMATELY C3-C7.  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     FILI and regular echo showed no evidence of vegetation on the bowel is however he has persistent multiple positive blood cultures continue endocarditis a possible diagnosis he also has pyelonephritis significant infection of the kidney status post stent placement for obstruction    MRI consistent with spine infection discitis osteomyelitis and soft tissue infection undoubtedly related to the MSSA infection involving blood and kidney            Patient Active Problem List   Diagnosis    Acute pyelonephritis    Sepsis ([address]    Uncontrolled type 2 diabetes mellitus with complication, with long-term current use of insulin ([address]            ASSESSMENT:PLAN:  MSSA endocarditis  MSSA pyelonephritis   MSSA cervical disciti    Continue nafcillin    Blood cultures 11/13/17 negative at 48 hours    His blood cultures stay negative next 48 hours will be of replace PICC line he will need 6 weeks of IV antibiotics with  nafcillin

## 2017-11-15 NOTE — PROGRESS NOTES
MSSA endocarditis  MSSA pyelonephritis      No fevers chills and sweats nausea vomiting diarrhea shortness of breath,    BP (!) 164/84   Pulse 115   Temp 99 °F (37.2 °C) (Oral)   Resp 18   Ht 5' 6\" (1.676 m)   Wt 166 lb 3.2 oz (75.4 kg)   SpO2 100%   BMI 26.83 kg/m²     Head: Normocephalic. Eyes: Pupils are equal, round, and reactive to light. Neck: No JVD present. No tracheal deviation present. No thyromegaly present. Cardiovascular: Normal rate, normal heart sounds and intact distal pulses. Exam reveals no gallop and no friction rub. No murmur heard. Pulmonary/Chest: Effort normal and breath sounds normal. No respiratory distress. He has no wheezes. He has no rales. He exhibits no tenderness. Abdominal: Soft. Bowel sounds are normal. He exhibits no distension and no mass. There is no tenderness. There is no rebound and no guarding. Musculoskeletal: Normal range of motion. He exhibits no edema or tenderness.              EXAMINATION: MRI CERVICAL SPINE W WO CONTRAST       DATE AND TIME:11/10/2017 8:45 PM       CLINICAL HISTORY: Severe neck pain  SPINE INFECTION         COMPARISON: If available previous films were reviewed for comparison.       TECHNIQUE: Multiplanar, multi-sequence MRI scans . 15 mL of ProHance contrast ministered intravenously.       FINDINGS: There is some abnormal enhancing prevertebral soft tissue extending from the level of C3-C7 in the region of the retropharyngeal spaces. There also is some enhancement of the longus coli muscles consistent with inflammation. Some paravertebral    enhancement as well primarily at the C4-C6 levels. No organized paraspinal or epidural abscess. Findings consistent with an inflammatory process/phlegmon. Early microabscess formation is not entirely excluded. The cervical vertebral bodies remain intact    without MR evidence of discitis or osteomyelitis. Marrow signal is unremarkable.  Degenerative disc changes primarily at C5-6 and C6-7 with

## 2017-11-16 LAB
ANION GAP SERPL CALCULATED.3IONS-SCNC: 13 MEQ/L (ref 7–13)
BASOPHILS ABSOLUTE: 0.1 K/UL (ref 0–0.2)
BASOPHILS RELATIVE PERCENT: 1 %
BUN BLDV-MCNC: 12 MG/DL (ref 6–20)
CALCIUM SERPL-MCNC: 8 MG/DL (ref 8.6–10.2)
CHLORIDE BLD-SCNC: 95 MEQ/L (ref 98–107)
CO2: 23 MEQ/L (ref 22–29)
CREAT SERPL-MCNC: 1.04 MG/DL (ref 0.7–1.2)
EOSINOPHILS ABSOLUTE: 0.3 K/UL (ref 0–0.7)
EOSINOPHILS RELATIVE PERCENT: 2.4 %
GFR AFRICAN AMERICAN: >60
GFR NON-AFRICAN AMERICAN: >60
GLUCOSE BLD-MCNC: 188 MG/DL (ref 74–109)
GLUCOSE BLD-MCNC: 201 MG/DL (ref 60–115)
GLUCOSE BLD-MCNC: 246 MG/DL (ref 60–115)
GLUCOSE BLD-MCNC: 260 MG/DL (ref 60–115)
GLUCOSE BLD-MCNC: 309 MG/DL (ref 60–115)
HCT VFR BLD CALC: 27.5 % (ref 42–52)
HEMOGLOBIN: 9.1 G/DL (ref 14–18)
LYMPHOCYTES ABSOLUTE: 1.5 K/UL (ref 1–4.8)
LYMPHOCYTES RELATIVE PERCENT: 13.8 %
MCH RBC QN AUTO: 28.2 PG (ref 27–31.3)
MCHC RBC AUTO-ENTMCNC: 33.3 % (ref 33–37)
MCV RBC AUTO: 84.7 FL (ref 80–100)
MONOCYTES ABSOLUTE: 0.6 K/UL (ref 0.2–0.8)
MONOCYTES RELATIVE PERCENT: 5.8 %
NEUTROPHILS ABSOLUTE: 8.1 K/UL (ref 1.4–6.5)
NEUTROPHILS RELATIVE PERCENT: 77 %
PDW BLD-RTO: 13.9 % (ref 11.5–14.5)
PERFORMED ON: ABNORMAL
PLATELET # BLD: 462 K/UL (ref 130–400)
POTASSIUM SERPL-SCNC: 4.1 MEQ/L (ref 3.5–5.1)
RBC # BLD: 3.24 M/UL (ref 4.7–6.1)
SODIUM BLD-SCNC: 131 MEQ/L (ref 132–144)
WBC # BLD: 10.6 K/UL (ref 4.8–10.8)

## 2017-11-16 PROCEDURE — 2060000000 HC ICU INTERMEDIATE R&B

## 2017-11-16 PROCEDURE — 2500000003 HC RX 250 WO HCPCS: Performed by: INTERNAL MEDICINE

## 2017-11-16 PROCEDURE — 85025 COMPLETE CBC W/AUTO DIFF WBC: CPT

## 2017-11-16 PROCEDURE — 2580000003 HC RX 258

## 2017-11-16 PROCEDURE — 2580000003 HC RX 258: Performed by: UROLOGY

## 2017-11-16 PROCEDURE — 80048 BASIC METABOLIC PNL TOTAL CA: CPT

## 2017-11-16 PROCEDURE — 2580000003 HC RX 258: Performed by: INTERNAL MEDICINE

## 2017-11-16 PROCEDURE — 99232 SBSQ HOSP IP/OBS MODERATE 35: CPT | Performed by: INTERNAL MEDICINE

## 2017-11-16 PROCEDURE — 6370000000 HC RX 637 (ALT 250 FOR IP): Performed by: INTERNAL MEDICINE

## 2017-11-16 PROCEDURE — 6360000002 HC RX W HCPCS: Performed by: INTERNAL MEDICINE

## 2017-11-16 PROCEDURE — 36415 COLL VENOUS BLD VENIPUNCTURE: CPT

## 2017-11-16 PROCEDURE — 99231 SBSQ HOSP IP/OBS SF/LOW 25: CPT | Performed by: INTERNAL MEDICINE

## 2017-11-16 RX ORDER — LIDOCAINE HYDROCHLORIDE 20 MG/ML
5 INJECTION, SOLUTION INFILTRATION; PERINEURAL ONCE
Status: DISCONTINUED | OUTPATIENT
Start: 2017-11-16 | End: 2017-11-16

## 2017-11-16 RX ORDER — SODIUM CHLORIDE, SODIUM LACTATE, POTASSIUM CHLORIDE, CALCIUM CHLORIDE 600; 310; 30; 20 MG/100ML; MG/100ML; MG/100ML; MG/100ML
INJECTION, SOLUTION INTRAVENOUS
Status: COMPLETED
Start: 2017-11-16 | End: 2017-11-16

## 2017-11-16 RX ORDER — SODIUM CHLORIDE, SODIUM LACTATE, POTASSIUM CHLORIDE, AND CALCIUM CHLORIDE .6; .31; .03; .02 G/100ML; G/100ML; G/100ML; G/100ML
1000 INJECTION, SOLUTION INTRAVENOUS ONCE
Status: COMPLETED | OUTPATIENT
Start: 2017-11-16 | End: 2017-11-16

## 2017-11-16 RX ORDER — SODIUM CHLORIDE 0.9 % (FLUSH) 0.9 %
10 SYRINGE (ML) INJECTION PRN
Status: DISCONTINUED | OUTPATIENT
Start: 2017-11-16 | End: 2017-11-16

## 2017-11-16 RX ORDER — LISINOPRIL 10 MG/1
10 TABLET ORAL DAILY
Status: DISCONTINUED | OUTPATIENT
Start: 2017-11-16 | End: 2017-11-18 | Stop reason: HOSPADM

## 2017-11-16 RX ORDER — SODIUM CHLORIDE 9 MG/ML
250 INJECTION, SOLUTION INTRAVENOUS ONCE
Status: DISCONTINUED | OUTPATIENT
Start: 2017-11-16 | End: 2017-11-16

## 2017-11-16 RX ORDER — INSULIN GLARGINE 100 [IU]/ML
40 INJECTION, SOLUTION SUBCUTANEOUS NIGHTLY
Status: DISCONTINUED | OUTPATIENT
Start: 2017-11-16 | End: 2017-11-17

## 2017-11-16 RX ORDER — SODIUM CHLORIDE 0.9 % (FLUSH) 0.9 %
10 SYRINGE (ML) INJECTION EVERY 12 HOURS SCHEDULED
Status: DISCONTINUED | OUTPATIENT
Start: 2017-11-16 | End: 2017-11-16

## 2017-11-16 RX ADMIN — SODIUM CHLORIDE, SODIUM LACTATE, POTASSIUM CHLORIDE, AND CALCIUM CHLORIDE 1000 ML: .6; .31; .03; .02 INJECTION, SOLUTION INTRAVENOUS at 11:03

## 2017-11-16 RX ADMIN — SODIUM CHLORIDE, POTASSIUM CHLORIDE, SODIUM LACTATE AND CALCIUM CHLORIDE 1000 ML: 600; 310; 30; 20 INJECTION, SOLUTION INTRAVENOUS at 11:03

## 2017-11-16 RX ADMIN — NAFCILLIN SODIUM: 2 INJECTION, POWDER, LYOPHILIZED, FOR SOLUTION INTRAMUSCULAR; INTRAVENOUS at 21:17

## 2017-11-16 RX ADMIN — NAFCILLIN SODIUM: 2 INJECTION, POWDER, LYOPHILIZED, FOR SOLUTION INTRAMUSCULAR; INTRAVENOUS at 17:07

## 2017-11-16 RX ADMIN — Medication 10 ML: at 08:53

## 2017-11-16 RX ADMIN — NAFCILLIN SODIUM: 2 INJECTION, POWDER, LYOPHILIZED, FOR SOLUTION INTRAMUSCULAR; INTRAVENOUS at 08:54

## 2017-11-16 RX ADMIN — HYDROMORPHONE HYDROCHLORIDE 1 MG: 1 INJECTION, SOLUTION INTRAMUSCULAR; INTRAVENOUS; SUBCUTANEOUS at 07:29

## 2017-11-16 RX ADMIN — INSULIN GLARGINE 40 UNITS: 100 INJECTION, SOLUTION SUBCUTANEOUS at 21:18

## 2017-11-16 RX ADMIN — LISINOPRIL 10 MG: 10 TABLET ORAL at 17:07

## 2017-11-16 RX ADMIN — NAFCILLIN SODIUM: 2 INJECTION, POWDER, LYOPHILIZED, FOR SOLUTION INTRAMUSCULAR; INTRAVENOUS at 13:19

## 2017-11-16 RX ADMIN — TAMSULOSIN HYDROCHLORIDE 0.4 MG: 0.4 CAPSULE ORAL at 08:56

## 2017-11-16 RX ADMIN — HYDROMORPHONE HYDROCHLORIDE 1 MG: 1 INJECTION, SOLUTION INTRAMUSCULAR; INTRAVENOUS; SUBCUTANEOUS at 17:08

## 2017-11-16 RX ADMIN — NAFCILLIN SODIUM: 2 INJECTION, POWDER, LYOPHILIZED, FOR SOLUTION INTRAMUSCULAR; INTRAVENOUS at 05:04

## 2017-11-16 RX ADMIN — NAFCILLIN SODIUM: 2 INJECTION, POWDER, LYOPHILIZED, FOR SOLUTION INTRAMUSCULAR; INTRAVENOUS at 01:09

## 2017-11-16 RX ADMIN — PANTOPRAZOLE SODIUM 40 MG: 40 TABLET, DELAYED RELEASE ORAL at 05:46

## 2017-11-16 ASSESSMENT — PAIN SCALES - GENERAL
PAINLEVEL_OUTOF10: 7
PAINLEVEL_OUTOF10: 9

## 2017-11-16 NOTE — PLAN OF CARE
Problem: Falls - Risk of  Goal: Absence of falls  Outcome: Ongoing      Problem: Pain:  Goal: Pain level will decrease  Pain level will decrease   Outcome: Ongoing    Goal: Control of acute pain  Control of acute pain   Outcome: Ongoing    Goal: Control of chronic pain  Control of chronic pain   Outcome: Ongoing      Problem: Mobility - Impaired:  Goal: Mobility will improve  Mobility will improve   Outcome: Ongoing

## 2017-11-16 NOTE — PROGRESS NOTES
Endocrinology Progress Note    Assessment and Plan:   Assessment-  1. Hyperglycemia  2. New Diagnosis type 2 insulin-dependent diabetes  3. Left kidney stone  4. Sepsis  5. Febrile last night with hyperglycemia        Plan-  1. Monitor glycemic control closely  2. Increase Lantus to 40 units at bedtime, leave Humalog 14 units 3 times a day with meals  3. Repeat blood cultures positive  4. Antibiotics per ID  POC Glucose:   Recent Labs      11/15/17   0617  11/15/17   1155  11/15/17   1613  11/15/17   2108  11/16/17   0639   POCGLU  136*  141*  216*  235*  246*     HGBA1C:  No results for input(s): LABA1C in the last 72 hours. CBC:   Recent Labs      11/15/17   0531  11/16/17   0525   WBC  10.2  10.6   HGB  9.1*  9.1*   PLT  469*  462*     CMP:    Recent Labs      11/14/17   0528  11/15/17   0531  11/16/17   0525   NA  134  133  131*   K  3.9  4.3  4.1   CL  97*  97*  95*   CO2  23  23  23   BUN  8  9  12   CREATININE  0.96  1.04  1.04   GLUCOSE  91  149*  188*   CALCIUM  7.8*  7.9*  8.0*   LABGLOM  >60.0  >60.0  >60.0         CC:   Chief Complaint   Patient presents with    Hematuria     started urinating blood 4 days ago, body hurts today, took [id number]  2 hrs ago       Subjective: Interval History: Patient is a 23-year-old man admitted for hematuria and left-sided flank and abdominal pain. Diagnosed with a right renal calculi and is undergoing a J stent placement via cystoscopy today. He was found to be hyperglycemic hemoglobin A1c was elevated and he was diagnosed with new onset diabetes. Patient had episode of hyperglycemia last night, he was also noted to be febrile with 101.1°F temperature.   Tylenol given with relief    Review of systems: denies polyuria, polydipsia, ABD pain, flank pain, N/V/D, or diaphoresis  Medications:   Scheduled Meds:   lactated ringers bolus  1,000 mL Intravenous Once    lidocaine  5 mL Intradermal Once    sodium chloride flush  10 mL Intravenous 2 times per day   

## 2017-11-16 NOTE — PROGRESS NOTES
Hospitalist Progress Note      PCP: No primary care provider on file. Date of Admission: 11/5/2017    Chief Complaint:    Chief Complaint   Patient presents with    Hematuria     started urinating blood 4 days ago, body hurts today, took [id number]  2 hrs ago     Subjective:  Patient feels well he has not  chills or night sweats; denies diarrhea. 10 point ROS negative other than mentioned above     Medications:  Reviewed    Infusion Medications    dextrose 100 mL/hr (11/07/17 1544)    dextrose 5 % and 0.45 % NaCl 150 mL/hr at 11/06/17 0728     Scheduled Medications    insulin lispro  14 Units Subcutaneous TID WC    insulin glargine  35 Units Subcutaneous Nightly    IVPB builder   Intravenous Q4H    sodium chloride flush  10 mL Intravenous 2 times per day    pantoprazole  40 mg Oral QAM AC    insulin lispro  0-6 Units Subcutaneous TID WC    insulin lispro  0-3 Units Subcutaneous Nightly    insulin regular  0.1 Units/kg Intravenous Once    tamsulosin  0.4 mg Oral Daily     PRN Meds: HYDROmorphone, zolpidem, sodium chloride flush, ondansetron, acetaminophen, calcium carbonate, glucose, glucagon (rDNA), dextrose, dextrose, dextrose 5 % and 0.45 % NaCl      Intake/Output Summary (Last 24 hours) at 11/16/17 0936  Last data filed at 11/16/17 0626   Gross per 24 hour   Intake              480 ml   Output                0 ml   Net              480 ml     Exam:    /81   Pulse 100   Temp 98.4 °F (36.9 °C) (Oral)   Resp 18   Ht 5' 6\" (1.676 m)   Wt 160 lb 4.8 oz (72.7 kg)   SpO2 98%   BMI 25.87 kg/m²     General appearance: No apparent distress, appears stated age and cooperative. HEENT: Pupils equal, round, and reactive to light. Conjunctivae/corneas clear. Poor dentition. Neck: Supple, with full range of motion. No jugular venous distention. Trachea midline. Respiratory:  Normal respiratory effort. Clear to auscultation, bilaterally without Rales/Wheezes/Rhonchi.   Cardiovascular: Regular rate and rhythm with normal S1/S2 without murmurs, rubs or gallops. Abdomen: Soft, non-tender, non-distended with normal bowel sounds. Musculoskeletal: No clubbing, cyanosis or edema bilaterally. Neuro: Non Focal. Intact and symmetric  Psychiatric: Alert and oriented, thought content appropriate, normal insight  Capillary Refill: Brisk,< 3 seconds   Peripheral Pulses: +2 palpable, equal bilaterally     Labs:   Recent Labs      11/14/17   0528  11/15/17   0531  11/16/17   0525   WBC  13.0*  10.2  10.6   HGB  9.9*  9.1*  9.1*   HCT  29.8*  27.0*  27.5*   PLT  491*  469*  462*     Recent Labs      11/14/17   0528  11/15/17   0531  11/16/17   0525   NA  134  133  131*   K  3.9  4.3  4.1   CL  97*  97*  95*   CO2  23  23  23   BUN  8  9  12   CREATININE  0.96  1.04  1.04   CALCIUM  7.8*  7.9*  8.0*     No results for input(s): AST, ALT, BILIDIR, BILITOT, ALKPHOS in the last 72 hours. No results for input(s): INR in the last 72 hours. No results for input(s): [place] in the last 72 hours. Urinalysis:      Lab Results   Component Value Date    NITRU POSITIVE 11/05/2017    WBCUA >100 11/05/2017    BACTERIA Many 11/05/2017    RBCUA 20-50 11/05/2017    BLOODU LARGE 11/05/2017    SPECGRAV 1.027 11/05/2017    GLUCOSEU >=1000 11/05/2017     Radiology:  RADIOLOGY REPORT   Final Result      XR ABDOMEN (KUB) (SINGLE AP VIEW)   Final Result      MRI CERVICAL SPINE W WO CONTRAST   Final Result   PREVERTEBRAL SOFT TISSUE INFLAMMATORY CHANGES FROM APPROXIMATELY C3-C7. 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 FL Less Than 1 Hour   Final Result      CT ABDOMEN PELVIS WO IV CONTRAST Additional Contrast? None   Final Result      Persistent 2 mm calculus at right ureterovesical junction with mild right hydroureter and enlarged right kidney.  No right nephrolithiasis or hydronephrosis. Right perinephric fat stranding and fluid has mildly increased since the prior study. Interval development of small bilateral pleural effusions and small bibasilar foci of dependent atelectasis/pneumonia. Splenomegaly. Sigmoid diverticulosis. Fat-containing left inguinal hernia, with no CT evidence of incarceration or obstruction. All CT scans at this facility use dose modulation, iterative reconstruction, and/or weight based dosing when appropriate to reduce radiation dose to as low as reasonably achievable. CT ABDOMEN PELVIS WO IV CONTRAST Additional Contrast? None   Final Result   MILDLY ENLARGED RIGHT KIDNEY WITH RIGHT PERINEPHRIC STRANDING. 2 MM CALCULUS EITHER WITHIN THE RIGHT URETEROVESICAL JUNCTION OR BLADDER. SOFT TISSUE DENSITY AND FAT AT THE POSTERIOR INFERIOR ASPECT OF THE RIGHT KIDNEY, POSSIBLE EXTRAVASATED    URINE. THE RIGHT KIDNEY BORDERS ARE ILL-DEFINED, POSSIBLE KIDNEY INFECTION. NO DEFINITE RIGHT-SIDED HYDRONEPHROSIS. 15 MM PLEURAL-BASED NODULE RIGHT LOWER LOBE. RIGHT BASILAR ATELECTASIS. MILD SPLENOMEGALY. LEFT INGUINAL HERNIA CONTAINING FAT. All CT scans at this facility use dose modulation, iterative reconstruction, and/or weight based dosing when appropriate to reduce radiation dose to as low as reasonably achievable. Assessment/Plan:  50 y/o M with a PMHx of DMII who presented with low back pain; found to be septic and developing DKA. He has a MSSA bactermia likely from pyelonephritis but he has had persistently positive BC which is concerning for possible endocarditis.      #Sepsis secondary to MSSA bacteremia secondary to pyelonephritis and spine infection discitis osteomyelitis and soft tissue infection likely due to the same with persistently positive BC concerning for endocarditis      - Although FILI was negative still concerning given how his [facility]ARE SYSTEM are still positive     - Appreciating ID recommendations; has been over 48 hours that the [facility] SYSTEM have been negative; will order a PICC line. CM/SW aware of ID recommendations of 6 weeks of IV Abx     #DMII     - continue SSI and Lantus per endocrine recommendations; newly diagnosed, will need to establish a PCP +/- Endocrinologist as an outpatient    Active Hospital Problems    Diagnosis Date Noted    Osteomyelitis of cervical spine ([facility] [M46.22]     Discitis of cervical region [M46.42]     Staphylococcus aureus bacteremia with sepsis ([address] [A41.01]     Hydronephrosis with urinary obstruction due to ureteral calculus [N13.2]     Acute pyelonephritis [N10]     Sepsis ([address] [A41.9]     Uncontrolled type 2 diabetes mellitus with complication, with long-term current use of insulin ([facility] [address] [E11.8, E11.65, Z79.4]     MSSA (methicillin susceptible Staphylococcus aureus) infection [A49.01]      Additional work up or/and treatment plan may be added today or then after based on clinical progression. I am managing a portion of pt care. Some medical issues are handled by other specialists. Additional work up and treatment should be done in out pt setting by pt PCP and other out pt providers. In addition to examining and evaluating pt, I spent additional time explaining care, normal and abnormal findings, and treatment plan. All of pt questions were answered. Counseling, diet and education were  provided. Case will be discussed with nursing staff when appropriate. Family will be updated if and when appropriate.       Diet: DIET CARB CONTROL; Carb Control: 5 carbs/meal (approximate 2000 kcals/day)    Code Status: Full Code    Electronically signed by Gagandeep Beyer MD on 11/16/2017 at 9:36 AM

## 2017-11-16 NOTE — FLOWSHEET NOTE
Patient complaining of 8/10 shoulder and neck pain. Medicated with dilaudid 1 mg. Patient voices no other needs at this time.

## 2017-11-16 NOTE — PROGRESS NOTES
within normal limits. Cervical cord is    normal in signal intensity.           Impression   PREVERTEBRAL SOFT TISSUE INFLAMMATORY CHANGES FROM APPROXIMATELY C3-C7.  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     FILI and regular echo showed no evidence of vegetation on the bowel is however he has persistent multiple positive blood cultures continue endocarditis a possible diagnosis he also has pyelonephritis significant infection of the kidney status post stent placement for obstruction    MRI consistent with spine infection discitis osteomyelitis and soft tissue infection undoubtedly related to the MSSA infection involving blood and kidney            Patient Active Problem List   Diagnosis    Acute pyelonephritis    Sepsis ([address]    Uncontrolled type 2 diabetes mellitus with complication, with long-term current use of insulin ([address]            ASSESSMENT:PLAN:  MSSA endocarditis  MSSA pyelonephritis   MSSA cervical disciti    Continue nafcillin    Blood cultures 11/13/17 negative at 72 hours  She'll be replace PICC line tomorrow signs blood cultures negative  His blood cultures stay negative next 48 hours will be of replace PICC line he will need 6 weeks of IV antibiotics with  nafcillin

## 2017-11-16 NOTE — PROGRESS NOTES
Spoke with  and he wants the Picc line held until tomorrow. If the blood cultures are still negative tomorrow then the Picc line can be placed. Electronically signed by Emilia Samuel on 11/16/2017 at 11:52 AM

## 2017-11-16 NOTE — PROGRESS NOTES
Patient had a QTC basline 11/6 of 503ms and started Protonix and Zofran 11/7 and no QTC taken since then. Please consider ordering a QTC, thanks.    Mesfin Guevara [facility]  11/16/2017  2:55 PM

## 2017-11-17 ENCOUNTER — APPOINTMENT (OUTPATIENT)
Dept: INTERVENTIONAL RADIOLOGY/VASCULAR | Age: 52
DRG: 720 | End: 2017-11-17
Payer: MEDICAID

## 2017-11-17 LAB
ANION GAP SERPL CALCULATED.3IONS-SCNC: 12 MEQ/L (ref 7–13)
BASOPHILS ABSOLUTE: 0.1 K/UL (ref 0–0.2)
BASOPHILS RELATIVE PERCENT: 1.1 %
BUN BLDV-MCNC: 10 MG/DL (ref 6–20)
CALCIUM SERPL-MCNC: 8 MG/DL (ref 8.6–10.2)
CHLORIDE BLD-SCNC: 99 MEQ/L (ref 98–107)
CO2: 23 MEQ/L (ref 22–29)
CREAT SERPL-MCNC: 1 MG/DL (ref 0.7–1.2)
EOSINOPHILS ABSOLUTE: 0.3 K/UL (ref 0–0.7)
EOSINOPHILS RELATIVE PERCENT: 2.9 %
GFR AFRICAN AMERICAN: >60
GFR NON-AFRICAN AMERICAN: >60
GLUCOSE BLD-MCNC: 192 MG/DL (ref 60–115)
GLUCOSE BLD-MCNC: 214 MG/DL (ref 60–115)
GLUCOSE BLD-MCNC: 226 MG/DL (ref 74–109)
HCT VFR BLD CALC: 25.7 % (ref 42–52)
HEMOGLOBIN: 8.8 G/DL (ref 14–18)
LYMPHOCYTES ABSOLUTE: 1.2 K/UL (ref 1–4.8)
LYMPHOCYTES RELATIVE PERCENT: 13.4 %
MCH RBC QN AUTO: 29.4 PG (ref 27–31.3)
MCHC RBC AUTO-ENTMCNC: 34.4 % (ref 33–37)
MCV RBC AUTO: 85.6 FL (ref 80–100)
MONOCYTES ABSOLUTE: 0.5 K/UL (ref 0.2–0.8)
MONOCYTES RELATIVE PERCENT: 6 %
NEUTROPHILS ABSOLUTE: 6.8 K/UL (ref 1.4–6.5)
NEUTROPHILS RELATIVE PERCENT: 76.6 %
PDW BLD-RTO: 14 % (ref 11.5–14.5)
PERFORMED ON: ABNORMAL
PERFORMED ON: ABNORMAL
PLATELET # BLD: 451 K/UL (ref 130–400)
POTASSIUM SERPL-SCNC: 4.5 MEQ/L (ref 3.5–5.1)
RBC # BLD: 3 M/UL (ref 4.7–6.1)
SODIUM BLD-SCNC: 134 MEQ/L (ref 132–144)
WBC # BLD: 8.8 K/UL (ref 4.8–10.8)

## 2017-11-17 PROCEDURE — 2060000000 HC ICU INTERMEDIATE R&B

## 2017-11-17 PROCEDURE — 2500000003 HC RX 250 WO HCPCS: Performed by: INTERNAL MEDICINE

## 2017-11-17 PROCEDURE — 6370000000 HC RX 637 (ALT 250 FOR IP): Performed by: INTERNAL MEDICINE

## 2017-11-17 PROCEDURE — 2580000003 HC RX 258: Performed by: INTERNAL MEDICINE

## 2017-11-17 PROCEDURE — 99231 SBSQ HOSP IP/OBS SF/LOW 25: CPT | Performed by: PHYSICIAN ASSISTANT

## 2017-11-17 PROCEDURE — C1751 CATH, INF, PER/CENT/MIDLINE: HCPCS

## 2017-11-17 PROCEDURE — 99232 SBSQ HOSP IP/OBS MODERATE 35: CPT | Performed by: INTERNAL MEDICINE

## 2017-11-17 PROCEDURE — 2580000003 HC RX 258

## 2017-11-17 PROCEDURE — 36569 INSJ PICC 5 YR+ W/O IMAGING: CPT | Performed by: RADIOLOGY

## 2017-11-17 PROCEDURE — 80048 BASIC METABOLIC PNL TOTAL CA: CPT

## 2017-11-17 PROCEDURE — 77001 FLUOROGUIDE FOR VEIN DEVICE: CPT | Performed by: RADIOLOGY

## 2017-11-17 PROCEDURE — 76937 US GUIDE VASCULAR ACCESS: CPT | Performed by: RADIOLOGY

## 2017-11-17 PROCEDURE — 6360000002 HC RX W HCPCS: Performed by: INTERNAL MEDICINE

## 2017-11-17 PROCEDURE — 85025 COMPLETE CBC W/AUTO DIFF WBC: CPT

## 2017-11-17 PROCEDURE — 36415 COLL VENOUS BLD VENIPUNCTURE: CPT

## 2017-11-17 RX ORDER — SODIUM CHLORIDE 9 MG/ML
250 INJECTION, SOLUTION INTRAVENOUS ONCE
Status: COMPLETED | OUTPATIENT
Start: 2017-11-17 | End: 2017-11-17

## 2017-11-17 RX ORDER — SODIUM CHLORIDE 0.9 % (FLUSH) 0.9 %
10 SYRINGE (ML) INJECTION PRN
Status: DISCONTINUED | OUTPATIENT
Start: 2017-11-17 | End: 2017-11-18 | Stop reason: HOSPADM

## 2017-11-17 RX ORDER — SODIUM CHLORIDE 9 MG/ML
INJECTION, SOLUTION INTRAVENOUS
Status: COMPLETED
Start: 2017-11-17 | End: 2017-11-17

## 2017-11-17 RX ORDER — SODIUM CHLORIDE 0.9 % (FLUSH) 0.9 %
10 SYRINGE (ML) INJECTION EVERY 12 HOURS SCHEDULED
Status: DISCONTINUED | OUTPATIENT
Start: 2017-11-17 | End: 2017-11-18 | Stop reason: HOSPADM

## 2017-11-17 RX ORDER — INSULIN GLARGINE 100 [IU]/ML
45 INJECTION, SOLUTION SUBCUTANEOUS NIGHTLY
Status: DISCONTINUED | OUTPATIENT
Start: 2017-11-17 | End: 2017-11-18 | Stop reason: HOSPADM

## 2017-11-17 RX ORDER — LIDOCAINE HYDROCHLORIDE 20 MG/ML
5 INJECTION, SOLUTION INFILTRATION; PERINEURAL ONCE
Status: COMPLETED | OUTPATIENT
Start: 2017-11-17 | End: 2017-11-17

## 2017-11-17 RX ADMIN — NAFCILLIN SODIUM: 2 INJECTION, POWDER, LYOPHILIZED, FOR SOLUTION INTRAMUSCULAR; INTRAVENOUS at 21:00

## 2017-11-17 RX ADMIN — HYDROMORPHONE HYDROCHLORIDE 1 MG: 1 INJECTION, SOLUTION INTRAMUSCULAR; INTRAVENOUS; SUBCUTANEOUS at 05:00

## 2017-11-17 RX ADMIN — NAFCILLIN SODIUM: 2 INJECTION, POWDER, LYOPHILIZED, FOR SOLUTION INTRAMUSCULAR; INTRAVENOUS at 07:59

## 2017-11-17 RX ADMIN — LIDOCAINE HYDROCHLORIDE 5 ML: 20 INJECTION, SOLUTION INFILTRATION; PERINEURAL at 14:20

## 2017-11-17 RX ADMIN — HYDROMORPHONE HYDROCHLORIDE 1 MG: 1 INJECTION, SOLUTION INTRAMUSCULAR; INTRAVENOUS; SUBCUTANEOUS at 12:24

## 2017-11-17 RX ADMIN — LISINOPRIL 10 MG: 10 TABLET ORAL at 07:59

## 2017-11-17 RX ADMIN — NAFCILLIN SODIUM: 2 INJECTION, POWDER, LYOPHILIZED, FOR SOLUTION INTRAMUSCULAR; INTRAVENOUS at 04:51

## 2017-11-17 RX ADMIN — HYDROMORPHONE HYDROCHLORIDE 1 MG: 1 INJECTION, SOLUTION INTRAMUSCULAR; INTRAVENOUS; SUBCUTANEOUS at 17:00

## 2017-11-17 RX ADMIN — SODIUM CHLORIDE 500 ML: 9 INJECTION, SOLUTION INTRAVENOUS at 04:53

## 2017-11-17 RX ADMIN — SODIUM CHLORIDE 250 ML: 9 INJECTION, SOLUTION INTRAVENOUS at 14:25

## 2017-11-17 RX ADMIN — INSULIN GLARGINE 45 UNITS: 100 INJECTION, SOLUTION SUBCUTANEOUS at 21:40

## 2017-11-17 RX ADMIN — HYDROMORPHONE HYDROCHLORIDE 1 MG: 1 INJECTION, SOLUTION INTRAMUSCULAR; INTRAVENOUS; SUBCUTANEOUS at 21:37

## 2017-11-17 RX ADMIN — TAMSULOSIN HYDROCHLORIDE 0.4 MG: 0.4 CAPSULE ORAL at 07:59

## 2017-11-17 RX ADMIN — HYDROMORPHONE HYDROCHLORIDE 1 MG: 1 INJECTION, SOLUTION INTRAMUSCULAR; INTRAVENOUS; SUBCUTANEOUS at 01:02

## 2017-11-17 RX ADMIN — NAFCILLIN SODIUM: 2 INJECTION, POWDER, LYOPHILIZED, FOR SOLUTION INTRAMUSCULAR; INTRAVENOUS at 12:54

## 2017-11-17 RX ADMIN — NAFCILLIN SODIUM: 2 INJECTION, POWDER, LYOPHILIZED, FOR SOLUTION INTRAMUSCULAR; INTRAVENOUS at 01:02

## 2017-11-17 RX ADMIN — NAFCILLIN SODIUM: 2 INJECTION, POWDER, LYOPHILIZED, FOR SOLUTION INTRAMUSCULAR; INTRAVENOUS at 16:50

## 2017-11-17 ASSESSMENT — PAIN SCALES - GENERAL
PAINLEVEL_OUTOF10: 0
PAINLEVEL_OUTOF10: 8
PAINLEVEL_OUTOF10: 0
PAINLEVEL_OUTOF10: 2
PAINLEVEL_OUTOF10: 8
PAINLEVEL_OUTOF10: 6

## 2017-11-17 ASSESSMENT — PAIN DESCRIPTION - ORIENTATION: ORIENTATION: MID

## 2017-11-17 ASSESSMENT — PAIN DESCRIPTION - PAIN TYPE: TYPE: ACUTE PAIN

## 2017-11-17 ASSESSMENT — PAIN DESCRIPTION - LOCATION: LOCATION: NECK

## 2017-11-17 NOTE — PROGRESS NOTES
Hospitalist Progress Note      PCP: No primary care provider on file. Date of Admission: 11/5/2017    Chief Complaint:    Chief Complaint   Patient presents with    Hematuria     started urinating blood 4 days ago, body hurts today, took [id number]  2 hrs ago     Subjective:  Patient feels well he still denies fevers,. chills or night sweats; denies diarrhea. 10 point ROS negative other than mentioned above     Medications:  Reviewed    Infusion Medications    dextrose 100 mL/hr (11/07/17 1544)    dextrose 5 % and 0.45 % NaCl 150 mL/hr at 11/06/17 0728     Scheduled Medications    insulin glargine  40 Units Subcutaneous Nightly    lisinopril  10 mg Oral Daily    insulin lispro  14 Units Subcutaneous TID WC    IVPB builder   Intravenous Q4H    sodium chloride flush  10 mL Intravenous 2 times per day    insulin lispro  0-6 Units Subcutaneous TID WC    insulin lispro  0-3 Units Subcutaneous Nightly    insulin regular  0.1 Units/kg Intravenous Once    tamsulosin  0.4 mg Oral Daily     PRN Meds: HYDROmorphone, zolpidem, sodium chloride flush, acetaminophen, calcium carbonate, glucose, glucagon (rDNA), dextrose, dextrose, dextrose 5 % and 0.45 % NaCl      Intake/Output Summary (Last 24 hours) at 11/17/17 0939  Last data filed at 11/16/17 1700   Gross per 24 hour   Intake              200 ml   Output                0 ml   Net              200 ml     Exam:    /67   Pulse 94   Temp 98.1 °F (36.7 °C) (Oral)   Resp 16   Ht 5' 6\" (1.676 m)   Wt 159 lb 9.6 oz (72.4 kg)   SpO2 98%   BMI 25.76 kg/m²     General appearance: No apparent distress, appears stated age and cooperative. HEENT: Pupils equal, round, and reactive to light. Conjunctivae/corneas clear. Poor dentition. Neck: Supple, with full range of motion. No jugular venous distention. Trachea midline. Respiratory:  Normal respiratory effort. Clear to auscultation, bilaterally without Rales/Wheezes/Rhonchi.   Cardiovascular: Regular rate and rhythm with normal S1/S2 without murmurs, rubs or gallops. Abdomen: Soft, non-tender, non-distended with normal bowel sounds. Musculoskeletal: No clubbing, cyanosis or edema bilaterally. Neuro: Non Focal. Intact and symmetric  Psychiatric: Alert and oriented, thought content appropriate, normal insight  Capillary Refill: Brisk,< 3 seconds   Peripheral Pulses: +2 palpable, equal bilaterally     Labs:   Recent Labs      11/15/17   0531  11/16/17   0525  11/17/17   0558   WBC  10.2  10.6  8.8   HGB  9.1*  9.1*  8.8*   HCT  27.0*  27.5*  25.7*   PLT  469*  462*  451*     Recent Labs      11/15/17   0531  11/16/17   0525  11/17/17   0558   NA  133  131*  134   K  4.3  4.1  4.5   CL  97*  95*  99   CO2  23  23  23   BUN  9  12  10   CREATININE  1.04  1.04  1.00   CALCIUM  7.9*  8.0*  8.0*     No results for input(s): AST, ALT, BILIDIR, BILITOT, ALKPHOS in the last 72 hours. No results for input(s): INR in the last 72 hours. No results for input(s): Deboraha Li in the last 72 hours. Urinalysis:      Lab Results   Component Value Date    NITRU POSITIVE 11/05/2017    WBCUA >100 11/05/2017    BACTERIA Many 11/05/2017    RBCUA 20-50 11/05/2017    BLOODU LARGE 11/05/2017    SPECGRAV 1.027 11/05/2017    GLUCOSEU >=1000 11/05/2017     Radiology:  RADIOLOGY REPORT   Final Result      XR ABDOMEN (KUB) (SINGLE AP VIEW)   Final Result      MRI CERVICAL SPINE W WO CONTRAST   Final Result   PREVERTEBRAL SOFT TISSUE INFLAMMATORY CHANGES FROM APPROXIMATELY C3-C7. 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 FL Less Than 1 Hour   Final Result      CT ABDOMEN PELVIS WO IV CONTRAST Additional Contrast? None   Final Result      Persistent 2 mm calculus at right ureterovesical junction with mild right hydroureter and enlarged right kidney.  No right nephrolithiasis or hydronephrosis. Right perinephric fat stranding and fluid has mildly increased since the prior study. Interval development of small bilateral pleural effusions and small bibasilar foci of dependent atelectasis/pneumonia. Splenomegaly. Sigmoid diverticulosis. Fat-containing left inguinal hernia, with no CT evidence of incarceration or obstruction. All CT scans at this facility use dose modulation, iterative reconstruction, and/or weight based dosing when appropriate to reduce radiation dose to as low as reasonably achievable. CT ABDOMEN PELVIS WO IV CONTRAST Additional Contrast? None   Final Result   MILDLY ENLARGED RIGHT KIDNEY WITH RIGHT PERINEPHRIC STRANDING. 2 MM CALCULUS EITHER WITHIN THE RIGHT URETEROVESICAL JUNCTION OR BLADDER. SOFT TISSUE DENSITY AND FAT AT THE POSTERIOR INFERIOR ASPECT OF THE RIGHT KIDNEY, POSSIBLE EXTRAVASATED    URINE. THE RIGHT KIDNEY BORDERS ARE ILL-DEFINED, POSSIBLE KIDNEY INFECTION. NO DEFINITE RIGHT-SIDED HYDRONEPHROSIS. 15 MM PLEURAL-BASED NODULE RIGHT LOWER LOBE. RIGHT BASILAR ATELECTASIS. MILD SPLENOMEGALY. LEFT INGUINAL HERNIA CONTAINING FAT. All CT scans at this facility use dose modulation, iterative reconstruction, and/or weight based dosing when appropriate to reduce radiation dose to as low as reasonably achievable. Assessment/Plan:  48 y/o M with a PMHx of DMII who presented with low back pain; found to be septic and developing DKA. He has a MSSA bactermia likely from pyelonephritis but he has had persistently positive BC which is concerning for possible endocarditis.      #Sepsis secondary to MSSA bacteremia secondary to pyelonephritis and spine infection discitis osteomyelitis and soft tissue infection likely due to the same with persistently positive BC concerning for endocarditis      - Although FILI was negative still concerning given how his [facility]ARE SYSTEM are still positive     - Appreciating ID recommendations; will await ID recommendationts in regards to timing of PICC line; pt is a unique case given how long it took his cultures to be clear. CM/SW aware of ID recommendations of 6 weeks of IV Abx     #DMII     - continue SSI and Lantus per endocrine recommendations; newly diagnosed, will need to establish a PCP +/- Endocrinologist as an outpatient    Active Hospital Problems    Diagnosis Date Noted    Osteomyelitis of cervical spine ([facility] [address] [M46.22]     Discitis of cervical region [M46.42]     Staphylococcus aureus bacteremia with sepsis ([address] [A41.01]     Hydronephrosis with urinary obstruction due to ureteral calculus [N13.2]     Acute pyelonephritis [N10]     Sepsis ([address] [A41.9]     Uncontrolled type 2 diabetes mellitus with complication, with long-term current use of insulin ([facility] [address] [E11.8, E11.65, Z79.4]     MSSA (methicillin susceptible Staphylococcus aureus) infection [A49.01]      Additional work up or/and treatment plan may be added today or then after based on clinical progression. I am managing a portion of pt care. Some medical issues are handled by other specialists. Additional work up and treatment should be done in out pt setting by pt PCP and other out pt providers. In addition to examining and evaluating pt, I spent additional time explaining care, normal and abnormal findings, and treatment plan. All of pt questions were answered. Counseling, diet and education were  provided. Case will be discussed with nursing staff when appropriate. Family will be updated if and when appropriate.       Diet: DIET CARB CONTROL; Carb Control: 5 carbs/meal (approximate 2000 kcals/day)    Code Status: Full Code    Electronically signed by Britany Zaldivar MD on 11/17/2017 at 9:39 AM

## 2017-11-17 NOTE — PROGRESS NOTES
Urology Progress Note    Assessment and Plan:   Assessment-  1. Acute pyelonephritis  2. Left ureteral stone with J stent placement  3. Sepsis  4. Newly diagnosed type 2 insulin-dependent diabetes        Plan-  1. Quijano catheter out, urinating well, no blood or pain  2. Outpatient follow-up and intra-ureteral stent removal 2-3 weeks following discharge from hospital  3. Patient may be discharged from urologic standpoint once cleared by other consultants  4. Blood cultures negative today  5. PICC line and antibiotics per ID    CC: Fever and fatigue      Patient Active Problem List:     Acute pyelonephritis     Sepsis ([facility] [address]     Uncontrolled type 2 diabetes mellitus with complication, with long-term current use of insulin ([facility])     MSSA (methicillin susceptible Staphylococcus aureus) septicemia ([facility] [address]     Hydronephrosis with urinary obstruction due to ureteral calculus     Staphylococcus aureus bacteremia with sepsis [facility])      Subjective:   Admit Date: 11/5/2017      Interval History: Mr. Antonella Lopez is a 81-year-old gentleman who was admitted for fever, hematuria and fatigue. Blood cultures were positive diagnosed with sepsis. CT scan revealed left hydronephrosis with a 2 mm ureteral stone. He underwent J stent placement on 11\8\17. KUB showed good stent placement. Patient's flank pain has resolved blood cultures remain positive that he is afebrile. Quijano catheter out.      Review of systems: denies dysuria, hematuria, ABD pain, flank pain, N/V/D  Medications:   Scheduled Meds:   sodium chloride flush  10 mL Intravenous 2 times per day    insulin glargine  45 Units Subcutaneous Nightly    insulin lispro  16 Units Subcutaneous TID WC    lisinopril  10 mg Oral Daily    IVPB builder   Intravenous Q4H    sodium chloride flush  10 mL Intravenous 2 times per day    insulin lispro  0-6 Units Subcutaneous TID WC    insulin lispro  0-3 Units Subcutaneous Nightly    insulin regular  0.1 Units/kg Intravenous Once   

## 2017-11-17 NOTE — CARE COORDINATION
SPOKE AT LENGTH WITH PATIENT AND WIFE REGARDING D/C PLANS. THEY PLAN FOR PATIENT TO GO HOME WITH [facility] TO FOLLOW. AWAITING APPROVAL FOR PATIENT TO HAVE PICC LINE INSERTED FROM ID TODAY TO ARRANGE FOR D/C LATER THIS EVENING. JORDON FROM [facility] NOTIFIED OF POTENTIAL D/C. PATIENT IS ON IV NAFCILLIN Q4. PATIENT WIFE IS AWARE AND IS WILLING TO ADMINISTER AS ORDERED. THEY ARE ALSO AWARE OF THE COST OF THE MEDICINE. CARE TEAM TO FOLLOW.  Electronically signed by Merle Starr RN on 11/17/2017 at 11:57 AM

## 2017-11-17 NOTE — PROGRESS NOTES
limits. Cervical cord is    normal in signal intensity.           Impression   PREVERTEBRAL SOFT TISSUE INFLAMMATORY CHANGES FROM APPROXIMATELY C3-C7.  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     FILI and regular echo showed no evidence of vegetation on the bowel is however he has persistent multiple positive blood cultures continue endocarditis a possible diagnosis he also has pyelonephritis significant infection of the kidney status post stent placement for obstruction    MRI consistent with spine infection discitis osteomyelitis and soft tissue infection undoubtedly related to the MSSA infection involving blood and kidney            Patient Active Problem List   Diagnosis    Acute pyelonephritis    Sepsis ([address]    Uncontrolled type 2 diabetes mellitus with complication, with long-term current use of insulin ([address]            ASSESSMENT:PLAN:  MSSA endocarditis  MSSA pyelonephritis   MSSA cervical disciti    Continue nafcillin  Blood cultures negative PICC line placed plan for 6 weeks IV nafcillin

## 2017-11-17 NOTE — HOME CARE
11/17/17 1423 Length (cm):  38 cm  -NH 11/17/17 1423   Size (Fr):  5  -NH 11/17/17 1423 Orientation:  Left  -NH 11/17/17 1423   Inserted by:  Mirza Madison RN  -NH 11/17/17 1423 Insertion attempts:  1  -NH 11/17/17 1423   Local Anesthetic:  Injectable  -NH 11/17/17 1423 Placement Verification:  Xray;Tip in SVC  -NH 11/17/17 1423   Ultrasound guided:  Yes  -NH 11/17/17 1423 Lot #:  [id number]  -NH 11/17/17 1423   Timeout:  Patient;Procedure;Site/Side;Appropriate Equipment; Consent Confirmed;Sterile Technique using full body drape;Site prepped with chlorhexidine;Sterile drsg with biopatch; Correct Position  -NH 11/17/17 1423 Pre-existing:  No  -NH 11/17/17 1423   Patient Tolerance:  Calm; Cooperative; Tolerated well  -NH 11/17/17 1423 Site Prep:  Chlorhexidine  -NH 11/17/17 1423   LDA Expiration Date:  11/30/18  -NH 11/17/17 1423         Assessments        11/17/17 1426           Is this a power PICC? Yes  -NH 11/17/17 1426           Site Assessment  Clean;Dry; Intact  -NH 11/17/17 1426           Red Lumen Status  Alcohol cap  applied; Blo-  od return  noted;Brisk  blood retur-  n;Capped;  Flushed  -NH 11/17/17 1426           Purple Lumen Status  Alcohol cap  applied; Blo-  od return  noted;Brisk  blood retur-  n;Capped;  Flushed  -NH 11/17/17 1426           Dressing Status  Clean;Dry; Intact  -NH 11/17/17 1426           Dressing Type  Transparent; Anti-microb-  ial patch; Securing  device  -NH 11/17/17 1426           Dressing Change Due  11/24/17  -NH 11/17/17 1426                    GENITOURINARY ()                                 HAD A DOUBLE J STENT IN THE PAST. MSSA PYELONEPHRITIS    GASTROENTEROLOGY (GI)               HOME CARE SERVICES       [x]   Skilled Nursing       [x]  Med/Surg IV ANTIBX ON PUMP        NEW TO INSULIN-WIFE TAUGHT. NEW DIABETES. TEACH NAFCILLIN ON PUMP.       [address] / Dolores Micheal / Howie Beat / Radha Foss /

## 2017-11-18 VITALS
HEIGHT: 66 IN | HEART RATE: 104 BPM | WEIGHT: 159.6 LBS | RESPIRATION RATE: 18 BRPM | DIASTOLIC BLOOD PRESSURE: 92 MMHG | OXYGEN SATURATION: 99 % | TEMPERATURE: 99.5 F | SYSTOLIC BLOOD PRESSURE: 143 MMHG | BODY MASS INDEX: 25.65 KG/M2

## 2017-11-18 LAB
ANION GAP SERPL CALCULATED.3IONS-SCNC: 13 MEQ/L (ref 7–13)
BASOPHILS ABSOLUTE: 0.1 K/UL (ref 0–0.2)
BASOPHILS RELATIVE PERCENT: 1.2 %
BLOOD CULTURE, ROUTINE: NORMAL
BUN BLDV-MCNC: 9 MG/DL (ref 6–20)
CALCIUM SERPL-MCNC: 8.7 MG/DL (ref 8.6–10.2)
CHLORIDE BLD-SCNC: 98 MEQ/L (ref 98–107)
CO2: 24 MEQ/L (ref 22–29)
CREAT SERPL-MCNC: 0.87 MG/DL (ref 0.7–1.2)
CULTURE, BLOOD 2: NORMAL
EOSINOPHILS ABSOLUTE: 0.4 K/UL (ref 0–0.7)
EOSINOPHILS RELATIVE PERCENT: 3.2 %
GFR AFRICAN AMERICAN: >60
GFR NON-AFRICAN AMERICAN: >60
GLUCOSE BLD-MCNC: 127 MG/DL (ref 74–109)
GLUCOSE BLD-MCNC: 128 MG/DL (ref 60–115)
HCT VFR BLD CALC: 30.1 % (ref 42–52)
HEMOGLOBIN: 10.2 G/DL (ref 14–18)
LYMPHOCYTES ABSOLUTE: 1.7 K/UL (ref 1–4.8)
LYMPHOCYTES RELATIVE PERCENT: 14.8 %
MCH RBC QN AUTO: 29 PG (ref 27–31.3)
MCHC RBC AUTO-ENTMCNC: 33.9 % (ref 33–37)
MCV RBC AUTO: 85.5 FL (ref 80–100)
MONOCYTES ABSOLUTE: 0.8 K/UL (ref 0.2–0.8)
MONOCYTES RELATIVE PERCENT: 6.8 %
NEUTROPHILS ABSOLUTE: 8.5 K/UL (ref 1.4–6.5)
NEUTROPHILS RELATIVE PERCENT: 74 %
PDW BLD-RTO: 14 % (ref 11.5–14.5)
PERFORMED ON: ABNORMAL
PLATELET # BLD: 452 K/UL (ref 130–400)
POTASSIUM SERPL-SCNC: 4.6 MEQ/L (ref 3.5–5.1)
RBC # BLD: 3.52 M/UL (ref 4.7–6.1)
SODIUM BLD-SCNC: 135 MEQ/L (ref 132–144)
WBC # BLD: 11.5 K/UL (ref 4.8–10.8)

## 2017-11-18 PROCEDURE — 36592 COLLECT BLOOD FROM PICC: CPT

## 2017-11-18 PROCEDURE — 80048 BASIC METABOLIC PNL TOTAL CA: CPT

## 2017-11-18 PROCEDURE — 85025 COMPLETE CBC W/AUTO DIFF WBC: CPT

## 2017-11-18 PROCEDURE — 2500000003 HC RX 250 WO HCPCS: Performed by: INTERNAL MEDICINE

## 2017-11-18 PROCEDURE — 2580000003 HC RX 258: Performed by: INTERNAL MEDICINE

## 2017-11-18 PROCEDURE — 6370000000 HC RX 637 (ALT 250 FOR IP): Performed by: INTERNAL MEDICINE

## 2017-11-18 PROCEDURE — 6360000002 HC RX W HCPCS: Performed by: INTERNAL MEDICINE

## 2017-11-18 RX ORDER — LISINOPRIL 10 MG/1
10 TABLET ORAL DAILY
Qty: 30 TABLET | Refills: 3 | Status: SHIPPED | OUTPATIENT
Start: 2017-11-19 | End: 2017-12-18 | Stop reason: SDUPTHER

## 2017-11-18 RX ORDER — OXYCODONE HYDROCHLORIDE AND ACETAMINOPHEN 5; 325 MG/1; MG/1
1 TABLET ORAL EVERY 6 HOURS PRN
Qty: 12 TABLET | Refills: 0 | Status: SHIPPED | OUTPATIENT
Start: 2017-11-18 | End: 2017-11-21

## 2017-11-18 RX ADMIN — HYDROMORPHONE HYDROCHLORIDE 1 MG: 1 INJECTION, SOLUTION INTRAMUSCULAR; INTRAVENOUS; SUBCUTANEOUS at 05:48

## 2017-11-18 RX ADMIN — NAFCILLIN SODIUM: 2 INJECTION, POWDER, LYOPHILIZED, FOR SOLUTION INTRAMUSCULAR; INTRAVENOUS at 00:53

## 2017-11-18 RX ADMIN — NAFCILLIN SODIUM: 2 INJECTION, POWDER, LYOPHILIZED, FOR SOLUTION INTRAMUSCULAR; INTRAVENOUS at 08:01

## 2017-11-18 RX ADMIN — NAFCILLIN SODIUM: 2 INJECTION, POWDER, LYOPHILIZED, FOR SOLUTION INTRAMUSCULAR; INTRAVENOUS at 04:57

## 2017-11-18 RX ADMIN — LISINOPRIL 10 MG: 10 TABLET ORAL at 08:00

## 2017-11-18 RX ADMIN — SODIUM CHLORIDE, PRESERVATIVE FREE 10 ML: 5 INJECTION INTRAVENOUS at 08:01

## 2017-11-18 RX ADMIN — TAMSULOSIN HYDROCHLORIDE 0.4 MG: 0.4 CAPSULE ORAL at 08:00

## 2017-11-18 ASSESSMENT — PAIN SCALES - GENERAL: PAINLEVEL_OUTOF10: 8

## 2017-11-18 NOTE — PLAN OF CARE
Problem: Falls - Risk of  Goal: Absence of falls  Outcome: Completed Date Met: 11/18/17      Problem: Pain:  Goal: Pain level will decrease  Pain level will decrease   Outcome: Completed Date Met: 11/18/17    Goal: Control of chronic pain  Control of chronic pain   Outcome: Completed Date Met: 11/18/17

## 2017-11-18 NOTE — DISCHARGE SUMMARY
Hospital Medicine Discharge Summary    Isabell Pérez  :  1965  MRN:  [id number]    Admit date:  2017  Discharge date:  2017    Admitting Physician:  Maritza Ocampo DO  Primary Care Physician:  No primary care provider on file. Discharge Diagnoses: Active Problems:    Acute pyelonephritis    Sepsis ([address]    Uncontrolled type 2 diabetes mellitus with complication, with long-term current use of insulin (HCC)    MSSA (methicillin susceptible Staphylococcus aureus) infection    Hydronephrosis with urinary obstruction due to ureteral calculus    Staphylococcus aureus bacteremia with sepsis ([address]    Osteomyelitis of cervical spine ([address]    Discitis of cervical region    Chief Complaint   Patient presents with    Hematuria     started urinating blood 4 days ago, body hurts today, took [id number]  2 hrs ago     Hospital Course:   Isabell Pérez is a 49 y.o. male that was admitted and treated at [facility] for the following medical issues: Active Problems:    Acute pyelonephritis    Sepsis ([address]    Uncontrolled type 2 diabetes mellitus with complication, with long-term current use of insulin (HCC)    MSSA (methicillin susceptible Staphylococcus aureus) infection    Hydronephrosis with urinary obstruction due to ureteral calculus    Staphylococcus aureus bacteremia with sepsis ([address]    Osteomyelitis of cervical spine (HCC)    Discitis of cervical region    48 y/o M with a PMHx of DMII who presented with low back pain; found to be septic and developing DKA. He has a MSSA bactermia likely from pyelonephritis but he has had persistently positive BC which is concerning for possible endocarditis. He also was found to have new onset insulin dependant DMII. He was seen by endocrinology and ID; was discharged with [facility] for long term IV Abx via a PICC line. Insulin and DM teaching was done and he was also discharged with diabetic management per endocrine recommendations.   Pt There is right-sided perinephric stranding with soft tissue density in the perirenal fat located posterior and inferior to the right kidney. There is mild dilatation of the right ureter. There is a 2 mm calcification at the expected location of  the right ureterovesical junction or within the bladder. Suspect recent passage of calculus through the right ureter into either the distal ureter or bladder. The border the right kidney is somewhat ill-defined. The right kidney may be infected. Left kidney is unremarkable. No left-sided hydronephrosis. No nephrolithiasis. Bladder is minimally distended and difficult to evaluate. Calcified phleboliths within the pelvis. There is calcification of the vas deferens. No significant right-sided hydronephrosis. No retroperitoneal fluid collection or lymphadenopathy. No aortic aneurysm. There are minimal atherosclerotic calcifications in the aorta. Small umbilical hernia containing fat. There is discoid atelectasis at the right lung base. There is a 15 mm pleural-based nodule posteriorly in the right lower lobe. Left lung base is clear. The portions of liver, pancreas, and adrenal glands visualized have normal morphology. Mild splenic enlargement. The spleen measures 15 cm in length. No focal splenic or liver lesion. There is no bowel dilatation. Appendix is normal.  There are few diverticula in the sigmoid colon. There is no inflammation within the mesentery. No fluid collection, free fluid, or free air. Left inguinal hernia containing fat. Bones are intact. MILDLY ENLARGED RIGHT KIDNEY WITH RIGHT PERINEPHRIC STRANDING. 2 MM CALCULUS EITHER WITHIN THE RIGHT URETEROVESICAL JUNCTION OR BLADDER. SOFT TISSUE DENSITY AND FAT AT THE POSTERIOR INFERIOR ASPECT OF THE RIGHT KIDNEY, POSSIBLE EXTRAVASATED URINE. THE RIGHT KIDNEY BORDERS ARE ILL-DEFINED, POSSIBLE KIDNEY INFECTION. NO DEFINITE RIGHT-SIDED HYDRONEPHROSIS. 15 MM PLEURAL-BASED NODULE RIGHT LOWER LOBE.  RIGHT BASILAR ATELECTASIS. MILD SPLENOMEGALY. LEFT INGUINAL HERNIA CONTAINING FAT. All CT scans at this facility use dose modulation, iterative reconstruction, and/or weight based dosing when appropriate to reduce radiation dose to as low as reasonably achievable. Discharge Medications:       Sabrina Javier   Home Medication Instructions SNX:[id number]    Printed on:11/18/17 1556   Medication Information                      FREESTYLE LANCETS MISC  1 each by Does not apply route daily             glucose blood VI test strips (FREESTYLE LITE) strip  1 each by In Vitro route daily As needed. insulin NPH (HUMULIN N) 100 UNIT/ML injection vial  Inject 45 Units into the skin nightly             insulin regular (HUMULIN R) 100 UNIT/ML injection  Inject 16 Units into the skin 3 times daily (before meals)             Insulin Syringe-Needle U-100 31G X 5/16\" 1 ML MISC  1 each by Does not apply route 4 times daily             Insulin Syringe-Needle U-100 31G X 5/16\" 1 ML MISC  1 each by Does not apply route daily for 14 days             lisinopril (PRINIVIL;ZESTRIL) 10 MG tablet  Take 1 tablet by mouth daily             nafcillin infusion  Infuse 2,000 mg intravenously every 4 hours Compound per protocol             oxyCODONE-acetaminophen (PERCOCET) 5-325 MG per tablet  Take 1 tablet by mouth every 6 hours as needed for Pain . Disposition:   If discharged to Home, Any [place] [phone] needs that were indicated and/or required as been addressed and set up by Social Work. Condition at discharge: Pt was medically stable at the time of discharge. Activity: activity as tolerated    Total time taken for discharging this patient: 40 minutes. Greater than 70% of time was spent focused exclusively on this patient. Time was taken to review chart, discuss plans with consultants, reconciling medications, discussing plan answering questions with patient.      Mar Leon  11/18/2017, 3:56

## 2017-11-18 NOTE — PROGRESS NOTES
normal S1/S2 without murmurs, rubs or gallops. Abdomen: Soft, non-tender, non-distended with normal bowel sounds. Musculoskeletal: No clubbing, cyanosis or edema bilaterally. Neuro: Non Focal. Intact and symmetric  Psychiatric: Alert and oriented, thought content appropriate, normal insight  Capillary Refill: Brisk,< 3 seconds   Peripheral Pulses: +2 palpable, equal bilaterally     Labs:   Recent Labs      11/16/17 0525 11/17/17 0558 11/18/17 0512   WBC  10.6  8.8  11.5*   HGB  9.1*  8.8*  10.2*   HCT  27.5*  25.7*  30.1*   PLT  462*  451*  452*     Recent Labs      11/16/17 0525 11/17/17 0558 11/18/17 0526   NA  131*  134  135   K  4.1  4.5  4.6   CL  95*  99  98   CO2  23  23  24   BUN  12  10  9   CREATININE  1.04  1.00  0.87   CALCIUM  8.0*  8.0*  8.7     No results for input(s): AST, ALT, BILIDIR, BILITOT, ALKPHOS in the last 72 hours. No results for input(s): INR in the last 72 hours. No results for input(s): Kanika [employer] in the last 72 hours. Urinalysis:      Lab Results   Component Value Date    NITRU POSITIVE 11/05/2017    WBCUA >100 11/05/2017    BACTERIA Many 11/05/2017    RBCUA 20-50 11/05/2017    BLOODU LARGE 11/05/2017    SPECGRAV 1.027 11/05/2017    GLUCOSEU >=1000 11/05/2017     Radiology:  IR FLUORO GUIDED CVA DEVICE PLACEMENT   Final Result      IR ULTRASOUND GUIDANCE VASCULAR ACCESS   Final Result      IR PICC WO SQ PORT/PUMP > 5 YEARS   Final Result      RADIOLOGY REPORT   Final Result      XR ABDOMEN (KUB) (SINGLE AP VIEW)   Final Result      MRI CERVICAL SPINE W WO CONTRAST   Final Result   PREVERTEBRAL SOFT TISSUE INFLAMMATORY CHANGES FROM APPROXIMATELY C3-C7. 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       FL Less Than 1 Hour   Final Result      CT ABDOMEN PELVIS WO IV CONTRAST Additional Contrast? None   Final Result Persistent 2 mm calculus at right ureterovesical junction with mild right hydroureter and enlarged right kidney. No right nephrolithiasis or hydronephrosis. Right perinephric fat stranding and fluid has mildly increased since the prior study. Interval development of small bilateral pleural effusions and small bibasilar foci of dependent atelectasis/pneumonia. Splenomegaly. Sigmoid diverticulosis. Fat-containing left inguinal hernia, with no CT evidence of incarceration or obstruction. All CT scans at this facility use dose modulation, iterative reconstruction, and/or weight based dosing when appropriate to reduce radiation dose to as low as reasonably achievable. CT ABDOMEN PELVIS WO IV CONTRAST Additional Contrast? None   Final Result   MILDLY ENLARGED RIGHT KIDNEY WITH RIGHT PERINEPHRIC STRANDING. 2 MM CALCULUS EITHER WITHIN THE RIGHT URETEROVESICAL JUNCTION OR BLADDER. SOFT TISSUE DENSITY AND FAT AT THE POSTERIOR INFERIOR ASPECT OF THE RIGHT KIDNEY, POSSIBLE EXTRAVASATED    URINE. THE RIGHT KIDNEY BORDERS ARE ILL-DEFINED, POSSIBLE KIDNEY INFECTION. NO DEFINITE RIGHT-SIDED HYDRONEPHROSIS. 15 MM PLEURAL-BASED NODULE RIGHT LOWER LOBE. RIGHT BASILAR ATELECTASIS. MILD SPLENOMEGALY. LEFT INGUINAL HERNIA CONTAINING FAT. All CT scans at this facility use dose modulation, iterative reconstruction, and/or weight based dosing when appropriate to reduce radiation dose to as low as reasonably achievable. Assessment/Plan:  48 y/o M with a PMHx of DMII who presented with low back pain; found to be septic and developing DKA. He has a MSSA bactermia likely from pyelonephritis but he has had persistently positive BC which is concerning for possible endocarditis.      #Sepsis secondary to MSSA bacteremia secondary to pyelonephritis and spine infection discitis osteomyelitis and soft tissue infection likely due to the same with persistently positive

## 2017-11-27 ENCOUNTER — HOSPITAL ENCOUNTER (OUTPATIENT)
Age: 52
Setting detail: SPECIMEN
Discharge: HOME | End: 2017-11-27
Payer: MEDICAID

## 2017-11-27 ENCOUNTER — OFFICE VISIT (OUTPATIENT)
Dept: FAMILY MEDICINE CLINIC | Age: 52
End: 2017-11-27

## 2017-11-27 VITALS
WEIGHT: 159 LBS | RESPIRATION RATE: 16 BRPM | DIASTOLIC BLOOD PRESSURE: 60 MMHG | HEIGHT: 67 IN | BODY MASS INDEX: 24.96 KG/M2 | SYSTOLIC BLOOD PRESSURE: 126 MMHG | HEART RATE: 98 BPM | TEMPERATURE: 98 F

## 2017-11-27 DIAGNOSIS — E11.00 TYPE 2 DIABETES MELLITUS WITH HYPEROSMOLARITY WITHOUT COMA, WITH LONG-TERM CURRENT USE OF INSULIN (HCC): ICD-10-CM

## 2017-11-27 DIAGNOSIS — A41.01: ICD-10-CM

## 2017-11-27 DIAGNOSIS — I10 ESSENTIAL HYPERTENSION: Primary | ICD-10-CM

## 2017-11-27 DIAGNOSIS — Z79.4 TYPE 2 DIABETES MELLITUS WITH HYPEROSMOLARITY WITHOUT COMA, WITH LONG-TERM CURRENT USE OF INSULIN (HCC): ICD-10-CM

## 2017-11-27 LAB
ANION GAP SERPL CALCULATED.3IONS-SCNC: 15 MEQ/L (ref 7–13)
BASOPHILS ABSOLUTE: 0.2 K/UL (ref 0–0.2)
BASOPHILS RELATIVE PERCENT: 2.6 %
BUN BLDV-MCNC: 19 MG/DL (ref 6–20)
CALCIUM SERPL-MCNC: 8.8 MG/DL (ref 8.6–10.2)
CHLORIDE BLD-SCNC: 97 MEQ/L (ref 98–107)
CO2: 22 MEQ/L (ref 22–29)
CREAT SERPL-MCNC: 1.04 MG/DL (ref 0.7–1.2)
EOSINOPHILS ABSOLUTE: 0.4 K/UL (ref 0–0.7)
EOSINOPHILS RELATIVE PERCENT: 7.2 %
GFR AFRICAN AMERICAN: >60
GFR NON-AFRICAN AMERICAN: >60
GLUCOSE BLD-MCNC: 254 MG/DL (ref 74–109)
HCT VFR BLD CALC: 30.6 % (ref 42–52)
HEMOGLOBIN: 10.2 G/DL (ref 14–18)
LYMPHOCYTES ABSOLUTE: 0.9 K/UL (ref 1–4.8)
LYMPHOCYTES RELATIVE PERCENT: 15.1 %
MCH RBC QN AUTO: 29.1 PG (ref 27–31.3)
MCHC RBC AUTO-ENTMCNC: 33.5 % (ref 33–37)
MCV RBC AUTO: 87 FL (ref 80–100)
MONOCYTES ABSOLUTE: 0.4 K/UL (ref 0.2–0.8)
MONOCYTES RELATIVE PERCENT: 7.4 %
NEUTROPHILS ABSOLUTE: 4 K/UL (ref 1.4–6.5)
NEUTROPHILS RELATIVE PERCENT: 67.7 %
PDW BLD-RTO: 15.9 % (ref 11.5–14.5)
PLATELET # BLD: 316 K/UL (ref 130–400)
POTASSIUM SERPL-SCNC: 5.1 MEQ/L (ref 3.5–5.1)
RBC # BLD: 3.52 M/UL (ref 4.7–6.1)
SODIUM BLD-SCNC: 134 MEQ/L (ref 132–144)
WBC # BLD: 5.9 K/UL (ref 4.8–10.8)

## 2017-11-27 PROCEDURE — 80048 BASIC METABOLIC PNL TOTAL CA: CPT

## 2017-11-27 PROCEDURE — 85025 COMPLETE CBC W/AUTO DIFF WBC: CPT

## 2017-11-27 PROCEDURE — 99202 OFFICE O/P NEW SF 15 MIN: CPT | Performed by: FAMILY MEDICINE

## 2017-11-27 ASSESSMENT — ENCOUNTER SYMPTOMS
SHORTNESS OF BREATH: 0
ABDOMINAL PAIN: 0

## 2017-11-27 ASSESSMENT — PATIENT HEALTH QUESTIONNAIRE - PHQ9
SUM OF ALL RESPONSES TO PHQ QUESTIONS 1-9: 0
SUM OF ALL RESPONSES TO PHQ9 QUESTIONS 1 & 2: 0
2. FEELING DOWN, DEPRESSED OR HOPELESS: 0
1. LITTLE INTEREST OR PLEASURE IN DOING THINGS: 0

## 2017-11-27 NOTE — PROGRESS NOTES
Subjective:      Patient ID: Lonny Gonsalez is a 49 y.o. male    Hypertension   This is a chronic problem. The problem is controlled. Pertinent negatives include no shortness of breath. Risk factors for coronary artery disease include male gender, dyslipidemia, diabetes mellitus, stress and sedentary lifestyle. Past treatments include ACE inhibitors. The current treatment provides moderate improvement. Here with family. Here to establish. Recently in hospital for urosepsis. Seeing urology and endocrine and ID in follow up appts upcoming. Getting iv antibiotics thru I.D  Review of Systems   Constitutional: Negative for activity change and unexpected weight change. Respiratory: Negative for shortness of breath. Cardiovascular: Negative for leg swelling. Gastrointestinal: Negative for abdominal pain. Skin: Negative for rash. Neurological: Negative for dizziness.      Reviewed allergy, medical, social, surgical, family and med list changes and updated   Files  Social History     Social History    Marital status:      Spouse name: N/A    Number of children: N/A    Years of education: N/A     Social History Main Topics    Smoking status: Never Smoker    Smokeless tobacco: Never Used    Alcohol use None    Drug use: Unknown    Sexual activity: Not Asked     Other Topics Concern    None     Social History Narrative    None     Current Outpatient Prescriptions   Medication Sig Dispense Refill    lisinopril (PRINIVIL;ZESTRIL) 10 MG tablet Take 1 tablet by mouth daily 30 tablet 3    nafcillin infusion Infuse 2,000 mg intravenously every 4 hours Compound per protocol 360 g 1    insulin NPH (HUMULIN N) 100 UNIT/ML injection vial Inject 45 Units into the skin nightly 1 vial 3    insulin regular (HUMULIN R) 100 UNIT/ML injection Inject 16 Units into the skin 3 times daily (before meals) 1 vial 3    Insulin Syringe-Needle U-100 31G X 5/16\" 1 ML MISC 1 each by Does not apply route 4 times daily 150 each 3    FREESTYLE LANCETS MISC 1 each by Does not apply route daily 100 each 3    glucose blood VI test strips (FREESTYLE LITE) strip 1 each by In Vitro route daily As needed. 100 each 3    Insulin Syringe-Needle U-100 31G X 5/16\" 1 ML MISC 1 each by Does not apply route daily for 14 days 100 each 0     No current facility-administered medications for this visit. History reviewed. No pertinent family history. History reviewed. No pertinent past medical history. Objective:   BP (!) 160/100   Pulse 98   Temp 98 °F (36.7 °C) (Tympanic)   Resp 16   Ht 5' 7\" (1.702 m)   Wt 159 lb (72.1 kg)   BMI 24.90 kg/m²     Physical Exam  Neck:no carotid bruits. No masses. No adenopathy. No thyroid asymmetry. Lungs:clear and equal breath sounds. No wheezes or rales. Heart:rate reg. No murmur. No gallops   Pulses:Radials 2+ equal               Poster tib 1+ equal  Extremities:no edema in either leg  Gen: In no acute distress  Abdomen; B.S present. Soft  Non tender. No hepatosplenomegaly. No masses   Assessment:         1. Essential hypertension     2. Sepsis due to Staphylococcus aureus ([address]     3.  Type 2 diabetes mellitus with hyperosmolarity without coma, with long-term current use of insulin ([address]        Plan:   F/u with specialities as already arranged    Continue current medications and f/u in 2 months

## 2017-11-30 ENCOUNTER — HOSPITAL ENCOUNTER (OUTPATIENT)
Dept: GENERAL RADIOLOGY | Age: 52
Discharge: HOME | End: 2017-11-30
Payer: MEDICAID

## 2017-11-30 ENCOUNTER — OFFICE VISIT (OUTPATIENT)
Dept: UROLOGY | Age: 52
End: 2017-11-30

## 2017-11-30 ENCOUNTER — HOSPITAL ENCOUNTER (OUTPATIENT)
Dept: CT IMAGING | Age: 52
Discharge: HOME | End: 2017-11-30
Payer: MEDICAID

## 2017-11-30 VITALS
WEIGHT: 159 LBS | HEIGHT: 67 IN | DIASTOLIC BLOOD PRESSURE: 80 MMHG | SYSTOLIC BLOOD PRESSURE: 128 MMHG | HEART RATE: 111 BPM | BODY MASS INDEX: 24.96 KG/M2

## 2017-11-30 DIAGNOSIS — N20.0 RENAL CALCULI: ICD-10-CM

## 2017-11-30 DIAGNOSIS — N10 ACUTE PYELONEPHRITIS: ICD-10-CM

## 2017-11-30 DIAGNOSIS — N10 ACUTE PYELONEPHRITIS: Primary | ICD-10-CM

## 2017-11-30 PROCEDURE — 74000 XR ABDOMEN LIMITED (KUB): CPT

## 2017-11-30 PROCEDURE — 99203 OFFICE O/P NEW LOW 30 MIN: CPT | Performed by: UROLOGY

## 2017-11-30 PROCEDURE — 74176 CT ABD & PELVIS W/O CONTRAST: CPT

## 2017-11-30 RX ORDER — OXYCODONE HYDROCHLORIDE AND ACETAMINOPHEN 5; 325 MG/1; MG/1
1 TABLET ORAL EVERY 4 HOURS PRN
COMMUNITY
End: 2018-01-05

## 2017-11-30 NOTE — PROGRESS NOTES
[facility] EVALUATION NOTE                                                 H&P                                                                                                                                                 Reason for Visit  Urosepsis pyelonephritis secondary to distal right ureteral stone    History of Present Illness  Patient currently on antibiotics for 8 weeks due to sepsis  CT today shows no stone distal ureter  KUB shows no stone  Improved CT      Urologic Review of Systems/Symptoms  Denies hematuria  Denies dysuria  Denies incontinence  Denies flank pain  Other Urologic: Voiding without difficulty    Review of Systems  Head and neck: No issues/reviewed  Cardiac: No recent issues/reviewed  Pulmonary: No issues/reviewed  Gastrointestinal: No issues/reviewed  Neurologic: No recent issues/reviewed  Extremities: No issues/reviewed  Lymphatics: No lymphadenopathy no change  Genitourinary: See above  Skin: No issues/reviewed  Hospitalization: No hospitalization recently recent discharge for sepsis  Currently on IV antibiotic pump  All 14 categories of Review of Systems otherwise reviewed no other findings reported. History reviewed. No pertinent past medical history. Past Surgical History:   Procedure Laterality Date    CYSTOSCOPY INSERTION / REMOVAL STENT / STONE Right 11/8/2017    CYSTOSCOPY RIGHT DOUBLE J  STENT INSERTION performed by Randee Koenig MD at [address] [place] [address]     Social History     Social History    Marital status:      Spouse name: N/A    Number of children: N/A    Years of education: N/A     Social History Main Topics    Smoking status: Never Smoker    Smokeless tobacco: Never Used    Alcohol use None    Drug use: Unknown    Sexual activity: Not Asked     Other Topics Concern    None     Social History Narrative    None     History reviewed. No pertinent family history.   Current Outpatient Prescriptions   Medication Sig Dispense Refill    oxyCODONE-acetaminophen (PERCOCET) 5-325 MG per tablet Take 1 tablet by mouth every 4 hours as needed for Pain .  lisinopril (PRINIVIL;ZESTRIL) 10 MG tablet Take 1 tablet by mouth daily 30 tablet 3    nafcillin infusion Infuse 2,000 mg intravenously every 4 hours Compound per protocol 360 g 1    insulin NPH (HUMULIN N) 100 UNIT/ML injection vial Inject 45 Units into the skin nightly 1 vial 3    insulin regular (HUMULIN R) 100 UNIT/ML injection Inject 16 Units into the skin 3 times daily (before meals) 1 vial 3    Insulin Syringe-Needle U-100 31G X 5/16\" 1 ML MISC 1 each by Does not apply route 4 times daily 150 each 3    FREESTYLE LANCETS MISC 1 each by Does not apply route daily 100 each 3    glucose blood VI test strips (FREESTYLE LITE) strip 1 each by In Vitro route daily As needed. 100 each 3    Insulin Syringe-Needle U-100 31G X 5/16\" 1 ML MISC 1 each by Does not apply route daily for 14 days 100 each 0     No current facility-administered medications for this visit. Review of patient's allergies indicates no known allergies. All reviewed and verified by Dr Jeremias Guzman on today's visit    No results found for: PSA, PSADIA  No results found for this visit on 11/30/17. Physical Exam  Vitals:    11/30/17 1022   BP: 128/80   Pulse: 111   Weight: 159 lb (72.1 kg)   Height: 5' 7\" (1.702 m)     Constitutional: patient is oriented to person, place, and time. patient appears well-developed. not in distress. Ears: Adequate hearing/no hearing loss  Head: Normocephalic. Atraumatic  Neck: Normal range of motion. Cardiovascular: Normal rate, BP reviewed. sinus rhythm  Pulmonary/Chest: Normal respiratory effort  no shortness of breath  Abdominal: Not distended. No abdominal discomfort  Urologic Exam  CT reviewed. KUB reviewed. No changes in urologic exam .  Musculoskeletal: Normal range of motion. Ambulatory.   Extremities: No edema   Neurological: Cranial nerves intact   Skin: Skin is

## 2017-12-06 RX ORDER — LANCETS 28 GAUGE
1 EACH MISCELLANEOUS 3 TIMES DAILY
Qty: 100 EACH | Refills: 6 | Status: SHIPPED | OUTPATIENT
Start: 2017-12-06 | End: 2019-04-02 | Stop reason: SDUPTHER

## 2017-12-11 LAB
ANION GAP SERPL CALCULATED.3IONS-SCNC: 14 MEQ/L (ref 9–17)
BASOPHILS ABSOLUTE: 0.1 K/UL (ref 0–0.2)
BASOPHILS RELATIVE PERCENT: 1 %
BUN BLDV-MCNC: 21 MG/DL (ref 6–20)
CALCIUM SERPL-MCNC: 8.8 MG/DL (ref 8.6–10.2)
CHLORIDE BLD-SCNC: 101 MEQ/L (ref 97–107)
CO2: 22 MEQ/L (ref 17–24)
CREAT SERPL-MCNC: 1.08 MG/DL (ref 0.7–1.2)
EOSINOPHILS ABSOLUTE: 0.6 K/UL (ref 0–0.7)
EOSINOPHILS RELATIVE PERCENT: 10.7 %
GFR AFRICAN AMERICAN: >60
GFR NON-AFRICAN AMERICAN: >60
GLUCOSE BLD-MCNC: 321 MG/DL (ref 74–109)
HCT VFR BLD CALC: 33.7 % (ref 42–52)
HEMOGLOBIN: 11.1 G/DL (ref 14–18)
LYMPHOCYTES ABSOLUTE: 0.9 K/UL (ref 1–4.8)
LYMPHOCYTES RELATIVE PERCENT: 15.8 %
MCH RBC QN AUTO: 28.5 PG (ref 27–31.3)
MCHC RBC AUTO-ENTMCNC: 32.9 % (ref 33–37)
MCV RBC AUTO: 86.5 FL (ref 80–100)
MONOCYTES ABSOLUTE: 0.4 K/UL (ref 0.2–0.8)
MONOCYTES RELATIVE PERCENT: 7.4 %
NEUTROPHILS ABSOLUTE: 3.8 K/UL (ref 1.4–6.5)
NEUTROPHILS RELATIVE PERCENT: 65.1 %
PDW BLD-RTO: 17 % (ref 11.5–14.5)
PLATELET # BLD: 256 K/UL (ref 130–400)
POTASSIUM SERPL-SCNC: 4.8 MEQ/L (ref 3.2–4.4)
RBC # BLD: 3.9 M/UL (ref 4.7–6.1)
SODIUM BLD-SCNC: 137 MEQ/L (ref 132–138)
WBC # BLD: 5.8 K/UL (ref 4.8–10.8)

## 2017-12-12 ENCOUNTER — OFFICE VISIT (OUTPATIENT)
Dept: INFECTIOUS DISEASES | Age: 52
End: 2017-12-12

## 2017-12-12 VITALS
HEART RATE: 102 BPM | SYSTOLIC BLOOD PRESSURE: 142 MMHG | WEIGHT: 164 LBS | TEMPERATURE: 98.3 F | RESPIRATION RATE: 22 BRPM | DIASTOLIC BLOOD PRESSURE: 85 MMHG | HEIGHT: 67 IN | BODY MASS INDEX: 25.74 KG/M2

## 2017-12-12 DIAGNOSIS — I33.0 ENDOCARDITIS DUE TO METHICILLIN SUSCEPTIBLE STAPHYLOCOCCUS AUREUS (MSSA): Primary | ICD-10-CM

## 2017-12-12 DIAGNOSIS — N12 PYELONEPHRITIS: ICD-10-CM

## 2017-12-12 DIAGNOSIS — B95.61 ENDOCARDITIS DUE TO METHICILLIN SUSCEPTIBLE STAPHYLOCOCCUS AUREUS (MSSA): Primary | ICD-10-CM

## 2017-12-12 PROCEDURE — 99213 OFFICE O/P EST LOW 20 MIN: CPT | Performed by: INTERNAL MEDICINE

## 2017-12-12 ASSESSMENT — ENCOUNTER SYMPTOMS
RESPIRATORY NEGATIVE: 1
GASTROINTESTINAL NEGATIVE: 1

## 2017-12-12 NOTE — PROGRESS NOTES
Subjective:      Patient ID: Reginald Padilla is a 47 y.o. male. HPI         MSSA endocarditis  MSSA pyelonephritis       Presented with urinating blood  Low back pain with radiation to the groin  Blood cultures from admission are growing Staphylococcus urine is showing Staphylococcus aureus MSSA  Significant nausea vomiting  Fevers chills myalgias        CT scan showed right perinephric stranding no hydronephrosis     AMINATION: CT Abdomen and Pelvis without  contrast.       CLINICAL HISTORY: Hematuria for 4 days. ? He did have some right flank pain that went away today. ? He has had some fevers chills and myalgias. ? He took some ibuprofen prior to arrival.       COMPARISONS: None available.       TECHNIQUE: Contiguous axial images were obtained through the abdomen and pelvis without contrast enhancement.  Coronal reformations were made.       FINDINGS: There is right-sided perinephric stranding with soft tissue density in the perirenal fat located posterior and inferior to the right kidney. There is mild dilatation of the right ureter. There is a 2 mm calcification at the expected location of    the right ureterovesical junction or within the bladder. Suspect recent passage of calculus through the right ureter into either the distal ureter or bladder. The border the right kidney is somewhat ill-defined. The right kidney may be infected. Left    kidney is unremarkable. No left-sided hydronephrosis. No nephrolithiasis. Bladder is minimally distended and difficult to evaluate. Calcified phleboliths within the pelvis. There is calcification of the vas deferens. No significant right-sided    hydronephrosis.     No retroperitoneal fluid collection or lymphadenopathy.       No aortic aneurysm. There are minimal atherosclerotic calcifications in the aorta. Small umbilical hernia containing fat.       There is discoid atelectasis at the right lung base.  There is a 15 mm pleural-based nodule posteriorly in the right lower lobe. Left lung base is clear. The portions of liver, pancreas, and adrenal glands visualized have normal morphology. Mild splenic    enlargement. The spleen measures 15 cm in length. No focal splenic or liver lesion.          There is no bowel dilatation. Appendix is normal.  There are few diverticula in the sigmoid colon. There is no inflammation within the mesentery. No fluid collection, free fluid, or free air.  Left inguinal hernia containing fat.       Bones are intact.           Impression   MILDLY ENLARGED RIGHT KIDNEY WITH RIGHT PERINEPHRIC STRANDING. 2 MM CALCULUS EITHER WITHIN THE RIGHT URETEROVESICAL JUNCTION OR BLADDER. SOFT TISSUE DENSITY AND FAT AT THE POSTERIOR INFERIOR ASPECT OF THE RIGHT KIDNEY, POSSIBLE EXTRAVASATED    URINE. THE RIGHT KIDNEY BORDERS ARE ILL-DEFINED, POSSIBLE KIDNEY INFECTION. NO DEFINITE RIGHT-SIDED HYDRONEPHROSIS.     15 MM PLEURAL-BASED NODULE RIGHT LOWER LOBE.  RIGHT BASILAR ATELECTASIS.       MILD SPLENOMEGALY.       LEFT INGUINAL HERNIA CONTAINING FAT.                    Review of Systems: All 14 review of systems negative other than as stated above          Social History   Substance Use Topics    Smoking status: Never Smoker    Smokeless tobacco: Never Used    Alcohol use Not on file            Family medical historyDiabetes hypertension        Past Surgical History         Past Surgical History:   Procedure Laterality Date    HERNIA REPAIR   1998               No current facility-administered medications on file prior to encounter.       No current outpatient prescriptions on file prior to encounter.         No Known Allergies                                  Urine Culture [[id number]] (Abnormal) Collected: 11/05/17 1115   Order Status: Completed Specimen: Urine, clean catch Updated: 11/06/17 0974     Urine Culture, Routine --     Organism Staph aureus MSSA (A)     Urine Culture, Routine --     >100,000 CFU/ml   Sensitivity to follow   PBP2= Negative    Narrative:   and no guarding. Musculoskeletal: He exhibits no edema or tenderness. Neurological: He is alert. BP (!) 142/85   Pulse 102   Temp 98.3 °F (36.8 °C) (Oral)   Resp 22   Ht 5' 7\" (1.702 m)   Wt 164 lb (74.4 kg)   BMI 25.69 kg/m²     Assessment:      1. Endocarditis due to methicillin susceptible Staphylococcus aureus (MSSA)     2.  Pyelonephritis             Plan:      nafcillin 2 g every 4 hours  For 3 more weeks

## 2017-12-13 ENCOUNTER — ANESTHESIA EVENT (OUTPATIENT)
Dept: OPERATING ROOM | Age: 52
End: 2017-12-13
Payer: MEDICAID

## 2017-12-13 ENCOUNTER — HOSPITAL ENCOUNTER (OUTPATIENT)
Age: 52
Setting detail: OUTPATIENT SURGERY
Discharge: HOME | End: 2017-12-13
Attending: UROLOGY | Admitting: UROLOGY
Payer: MEDICAID

## 2017-12-13 ENCOUNTER — HOSPITAL ENCOUNTER (OUTPATIENT)
Dept: GENERAL RADIOLOGY | Age: 52
Setting detail: OUTPATIENT SURGERY
Discharge: HOME | End: 2017-12-13
Attending: UROLOGY
Payer: MEDICAID

## 2017-12-13 ENCOUNTER — ANESTHESIA (OUTPATIENT)
Dept: OPERATING ROOM | Age: 52
End: 2017-12-13
Payer: MEDICAID

## 2017-12-13 VITALS
HEART RATE: 103 BPM | DIASTOLIC BLOOD PRESSURE: 85 MMHG | BODY MASS INDEX: 26.21 KG/M2 | OXYGEN SATURATION: 99 % | WEIGHT: 167 LBS | TEMPERATURE: 97.6 F | SYSTOLIC BLOOD PRESSURE: 142 MMHG | HEIGHT: 67 IN | RESPIRATION RATE: 20 BRPM

## 2017-12-13 VITALS
TEMPERATURE: 97.5 F | RESPIRATION RATE: 13 BRPM | OXYGEN SATURATION: 100 % | DIASTOLIC BLOOD PRESSURE: 51 MMHG | SYSTOLIC BLOOD PRESSURE: 107 MMHG

## 2017-12-13 DIAGNOSIS — Z98.890 H/O LITHOTRIPSY: ICD-10-CM

## 2017-12-13 LAB
GLUCOSE BLD-MCNC: 75 MG/DL (ref 60–115)
GLUCOSE BLD-MCNC: 76 MG/DL (ref 60–115)
PERFORMED ON: NORMAL
PERFORMED ON: NORMAL

## 2017-12-13 PROCEDURE — 3600000014 HC SURGERY LEVEL 4 ADDTL 15MIN: Performed by: UROLOGY

## 2017-12-13 PROCEDURE — 99999 PR OFFICE/OUTPT VISIT,PROCEDURE ONLY: CPT | Performed by: UROLOGY

## 2017-12-13 PROCEDURE — 3700000001 HC ADD 15 MINUTES (ANESTHESIA): Performed by: UROLOGY

## 2017-12-13 PROCEDURE — 7100000000 HC PACU RECOVERY - FIRST 15 MIN: Performed by: UROLOGY

## 2017-12-13 PROCEDURE — 76000 FLUOROSCOPY <1 HR PHYS/QHP: CPT

## 2017-12-13 PROCEDURE — 6360000002 HC RX W HCPCS: Performed by: NURSE ANESTHETIST, CERTIFIED REGISTERED

## 2017-12-13 PROCEDURE — 3700000000 HC ANESTHESIA ATTENDED CARE: Performed by: UROLOGY

## 2017-12-13 PROCEDURE — 52351 CYSTOURETERO & OR PYELOSCOPE: CPT | Performed by: UROLOGY

## 2017-12-13 PROCEDURE — 7100000011 HC PHASE II RECOVERY - ADDTL 15 MIN: Performed by: UROLOGY

## 2017-12-13 PROCEDURE — 7100000001 HC PACU RECOVERY - ADDTL 15 MIN: Performed by: UROLOGY

## 2017-12-13 PROCEDURE — 6370000000 HC RX 637 (ALT 250 FOR IP): Performed by: UROLOGY

## 2017-12-13 PROCEDURE — 2580000003 HC RX 258: Performed by: STUDENT IN AN ORGANIZED HEALTH CARE EDUCATION/TRAINING PROGRAM

## 2017-12-13 PROCEDURE — 7100000010 HC PHASE II RECOVERY - FIRST 15 MIN: Performed by: UROLOGY

## 2017-12-13 PROCEDURE — C1758 CATHETER, URETERAL: HCPCS | Performed by: UROLOGY

## 2017-12-13 PROCEDURE — 6360000004 HC RX CONTRAST MEDICATION: Performed by: UROLOGY

## 2017-12-13 PROCEDURE — 2580000003 HC RX 258: Performed by: UROLOGY

## 2017-12-13 PROCEDURE — C1769 GUIDE WIRE: HCPCS | Performed by: UROLOGY

## 2017-12-13 PROCEDURE — 3600000004 HC SURGERY LEVEL 4 BASE: Performed by: UROLOGY

## 2017-12-13 RX ORDER — MEPERIDINE HYDROCHLORIDE 25 MG/ML
12.5 INJECTION INTRAMUSCULAR; INTRAVENOUS; SUBCUTANEOUS EVERY 5 MIN PRN
Status: DISCONTINUED | OUTPATIENT
Start: 2017-12-13 | End: 2017-12-13 | Stop reason: HOSPADM

## 2017-12-13 RX ORDER — DIPHENHYDRAMINE HYDROCHLORIDE 50 MG/ML
12.5 INJECTION INTRAMUSCULAR; INTRAVENOUS
Status: DISCONTINUED | OUTPATIENT
Start: 2017-12-13 | End: 2017-12-13 | Stop reason: HOSPADM

## 2017-12-13 RX ORDER — MAGNESIUM HYDROXIDE 1200 MG/15ML
LIQUID ORAL PRN
Status: DISCONTINUED | OUTPATIENT
Start: 2017-12-13 | End: 2017-12-13 | Stop reason: HOSPADM

## 2017-12-13 RX ORDER — ONDANSETRON 2 MG/ML
4 INJECTION INTRAMUSCULAR; INTRAVENOUS
Status: DISCONTINUED | OUTPATIENT
Start: 2017-12-13 | End: 2017-12-13 | Stop reason: HOSPADM

## 2017-12-13 RX ORDER — HYDROCODONE BITARTRATE AND ACETAMINOPHEN 5; 325 MG/1; MG/1
2 TABLET ORAL PRN
Status: DISCONTINUED | OUTPATIENT
Start: 2017-12-13 | End: 2017-12-13 | Stop reason: HOSPADM

## 2017-12-13 RX ORDER — SODIUM CHLORIDE 0.9 % (FLUSH) 0.9 %
10 SYRINGE (ML) INJECTION PRN
Status: DISCONTINUED | OUTPATIENT
Start: 2017-12-13 | End: 2017-12-13 | Stop reason: HOSPADM

## 2017-12-13 RX ORDER — PROPOFOL 10 MG/ML
INJECTION, EMULSION INTRAVENOUS PRN
Status: DISCONTINUED | OUTPATIENT
Start: 2017-12-13 | End: 2017-12-13 | Stop reason: SDUPTHER

## 2017-12-13 RX ORDER — ONDANSETRON 2 MG/ML
INJECTION INTRAMUSCULAR; INTRAVENOUS PRN
Status: DISCONTINUED | OUTPATIENT
Start: 2017-12-13 | End: 2017-12-13 | Stop reason: SDUPTHER

## 2017-12-13 RX ORDER — METOCLOPRAMIDE HYDROCHLORIDE 5 MG/ML
10 INJECTION INTRAMUSCULAR; INTRAVENOUS
Status: DISCONTINUED | OUTPATIENT
Start: 2017-12-13 | End: 2017-12-13 | Stop reason: HOSPADM

## 2017-12-13 RX ORDER — FENTANYL CITRATE 50 UG/ML
INJECTION, SOLUTION INTRAMUSCULAR; INTRAVENOUS PRN
Status: DISCONTINUED | OUTPATIENT
Start: 2017-12-13 | End: 2017-12-13 | Stop reason: SDUPTHER

## 2017-12-13 RX ORDER — LIDOCAINE HYDROCHLORIDE 10 MG/ML
1 INJECTION, SOLUTION EPIDURAL; INFILTRATION; INTRACAUDAL; PERINEURAL
Status: DISCONTINUED | OUTPATIENT
Start: 2017-12-13 | End: 2017-12-13 | Stop reason: HOSPADM

## 2017-12-13 RX ORDER — SODIUM CHLORIDE 0.9 % (FLUSH) 0.9 %
SYRINGE (ML) INJECTION
Status: DISCONTINUED
Start: 2017-12-13 | End: 2017-12-13 | Stop reason: HOSPADM

## 2017-12-13 RX ORDER — SODIUM CHLORIDE, SODIUM LACTATE, POTASSIUM CHLORIDE, CALCIUM CHLORIDE 600; 310; 30; 20 MG/100ML; MG/100ML; MG/100ML; MG/100ML
INJECTION, SOLUTION INTRAVENOUS CONTINUOUS
Status: DISCONTINUED | OUTPATIENT
Start: 2017-12-13 | End: 2017-12-13 | Stop reason: HOSPADM

## 2017-12-13 RX ORDER — FENTANYL CITRATE 50 UG/ML
50 INJECTION, SOLUTION INTRAMUSCULAR; INTRAVENOUS EVERY 10 MIN PRN
Status: DISCONTINUED | OUTPATIENT
Start: 2017-12-13 | End: 2017-12-13 | Stop reason: HOSPADM

## 2017-12-13 RX ORDER — SODIUM CHLORIDE 0.9 % (FLUSH) 0.9 %
10 SYRINGE (ML) INJECTION EVERY 12 HOURS SCHEDULED
Status: DISCONTINUED | OUTPATIENT
Start: 2017-12-13 | End: 2017-12-13 | Stop reason: HOSPADM

## 2017-12-13 RX ORDER — HYDROCODONE BITARTRATE AND ACETAMINOPHEN 5; 325 MG/1; MG/1
1 TABLET ORAL PRN
Status: DISCONTINUED | OUTPATIENT
Start: 2017-12-13 | End: 2017-12-13 | Stop reason: HOSPADM

## 2017-12-13 RX ORDER — MIDAZOLAM HYDROCHLORIDE 1 MG/ML
INJECTION INTRAMUSCULAR; INTRAVENOUS PRN
Status: DISCONTINUED | OUTPATIENT
Start: 2017-12-13 | End: 2017-12-13 | Stop reason: SDUPTHER

## 2017-12-13 RX ADMIN — PROPOFOL 200 MG: 10 INJECTION, EMULSION INTRAVENOUS at 16:32

## 2017-12-13 RX ADMIN — PHENYLEPHRINE HYDROCHLORIDE 100 MCG: 10 INJECTION INTRAVENOUS at 16:48

## 2017-12-13 RX ADMIN — PHENYLEPHRINE HYDROCHLORIDE 200 MCG: 10 INJECTION INTRAVENOUS at 16:54

## 2017-12-13 RX ADMIN — PHENYLEPHRINE HYDROCHLORIDE 200 MCG: 10 INJECTION INTRAVENOUS at 17:07

## 2017-12-13 RX ADMIN — PHENYLEPHRINE HYDROCHLORIDE 200 MCG: 10 INJECTION INTRAVENOUS at 17:00

## 2017-12-13 RX ADMIN — MIDAZOLAM HYDROCHLORIDE 2 MG: 1 INJECTION, SOLUTION INTRAMUSCULAR; INTRAVENOUS at 16:27

## 2017-12-13 RX ADMIN — PHENYLEPHRINE HYDROCHLORIDE 100 MCG: 10 INJECTION INTRAVENOUS at 16:38

## 2017-12-13 RX ADMIN — SODIUM CHLORIDE, POTASSIUM CHLORIDE, SODIUM LACTATE AND CALCIUM CHLORIDE: 600; 310; 30; 20 INJECTION, SOLUTION INTRAVENOUS at 15:21

## 2017-12-13 RX ADMIN — ONDANSETRON 4 MG: 2 INJECTION INTRAMUSCULAR; INTRAVENOUS at 16:52

## 2017-12-13 RX ADMIN — PHENYLEPHRINE HYDROCHLORIDE 100 MCG: 10 INJECTION INTRAVENOUS at 16:45

## 2017-12-13 RX ADMIN — FENTANYL CITRATE 100 MCG: 50 INJECTION, SOLUTION INTRAMUSCULAR; INTRAVENOUS at 16:32

## 2017-12-13 ASSESSMENT — PULMONARY FUNCTION TESTS
PIF_VALUE: 1
PIF_VALUE: 5
PIF_VALUE: 14
PIF_VALUE: 3
PIF_VALUE: 5
PIF_VALUE: 3
PIF_VALUE: 1
PIF_VALUE: 14
PIF_VALUE: 5
PIF_VALUE: 14
PIF_VALUE: 1
PIF_VALUE: 2
PIF_VALUE: 1
PIF_VALUE: 3
PIF_VALUE: 14
PIF_VALUE: 5
PIF_VALUE: 14
PIF_VALUE: 14
PIF_VALUE: 13
PIF_VALUE: 13
PIF_VALUE: 20
PIF_VALUE: 2
PIF_VALUE: 14
PIF_VALUE: 6
PIF_VALUE: 2
PIF_VALUE: 4
PIF_VALUE: 3
PIF_VALUE: 26
PIF_VALUE: 14
PIF_VALUE: 5
PIF_VALUE: 18
PIF_VALUE: 1
PIF_VALUE: 26
PIF_VALUE: 17
PIF_VALUE: 3
PIF_VALUE: 14
PIF_VALUE: 8

## 2017-12-13 ASSESSMENT — PAIN - FUNCTIONAL ASSESSMENT: PAIN_FUNCTIONAL_ASSESSMENT: 0-10

## 2017-12-13 ASSESSMENT — PAIN SCALES - GENERAL
PAINLEVEL_OUTOF10: 0

## 2017-12-13 ASSESSMENT — PAIN DESCRIPTION - PAIN TYPE: TYPE: SURGICAL PAIN

## 2017-12-13 NOTE — ANESTHESIA PRE PROCEDURE
DO Liv        sodium chloride flush 0.9 % injection 10 mL  10 mL Intravenous PRN Tashi Peck DO        lidocaine PF 1 % injection 1 mL  1 mL Intradermal Once PRN Hugo Oropeza DO           Allergies:  No Known Allergies    Problem List:    Patient Active Problem List   Diagnosis Code    Acute pyelonephritis N10    Sepsis ([facility] A41.9    Uncontrolled type 2 diabetes mellitus with complication, with long-term current use of insulin ([facility]) E11.8, E11.65, Z79.4    MSSA (methicillin susceptible Staphylococcus aureus) infection A49.01    Hydronephrosis with urinary obstruction due to ureteral calculus N13.2    Staphylococcus aureus bacteremia with sepsis ([facility] A41.01    Osteomyelitis of cervical spine ([facility] M46.22    Discitis of cervical region M46.42       Past Medical History:  No past medical history on file. Past Surgical History:        Procedure Laterality Date    CYSTOSCOPY INSERTION / REMOVAL STENT / STONE Right 11/8/2017    CYSTOSCOPY RIGHT DOUBLE J  STENT INSERTION performed by Heydi Mendoza MD at [address] [place] [address]       Social History:    Social History   Substance Use Topics    Smoking status: Never Smoker    Smokeless tobacco: Never Used    Alcohol use Not on file                                Counseling given: Not Answered      Vital Signs (Current): There were no vitals filed for this visit.                                            BP Readings from Last 3 Encounters:   12/12/17 (!) 142/85   11/30/17 128/80   11/27/17 126/60       NPO Status: Time of last liquid consumption: 0000                        Time of last solid consumption: 0000                        Date of last liquid consumption: 12/13/17                        Date of last solid food consumption: 12/13/17    BMI:   Wt Readings from Last 3 Encounters:   12/12/17 164 lb (74.4 kg)   11/30/17 159 lb (72.1 kg)   11/27/17 159 lb (72.1 kg)     There is no height or weight on file to calculate dysfunction. ( reversed E/A   ratio)   Normal right atrium.   Normal right ventricle structure and function.   Normal right ventricle systolic pressure.   No evidence of pericardial effusion.   No evidence of pleural effusion. Neuro/Psych:   Negative Neuro/Psych ROS              GI/Hepatic/Renal: Neg GI/Hepatic/Renal ROS  (+) renal disease: kidney stones,           Endo/Other:    (+) Type II DM, , .          Pt had PAT visit. Abdominal:           Vascular:                                    Anesthesia Plan      general     ASA 2     (LMA)  Induction: intravenous. Anesthetic plan and risks discussed with patient. Plan discussed with CRNA.     Attending anesthesiologist reviewed and agrees with Vito Oglesby DO   12/13/2017

## 2017-12-13 NOTE — BRIEF OP NOTE
Brief Postoperative Note    Conception Call  YOB: 1965  [id number]    Pre-operative Diagnosis: right hydronephrosis    Post-operative Diagnosis: Same    Procedure: cysto,urethral dilation.  Right ureteroscopy, right double stent removal    Anesthesia: General    Surgeons/Assistants: Holden Mata MD      Estimated Blood Loss: less than 50     Complications: None    Specimens: Was Not Obtained    Findings: no stone    Electronically signed by Holden Mata MD on 12/13/2017 at 5:14 PM

## 2017-12-13 NOTE — PROGRESS NOTES
piccline left arm flushed with 20 33 normal saline  Flushed well and patent site left upper arm no edema or redness antibiotic patent intact at discharge small amount bloody drainage from penis christopher anaya rn gave instructions wife present verbalized good understanding

## 2017-12-14 NOTE — OP NOTE
[address]                       [address], [address] [facility]                                 OPERATIVE REPORT    PATIENT NAME: Abimael Noel                  :        1965  MED REC NO:   [id number]                            ROOM:  ACCOUNT NO:   [de-identified]                           ADMIT DATE: 2017  PROVIDER:     Edda Saldaña MD            DATE OF PROCEDURE:  2017    PREOPERATIVE DIAGNOSES:  History of right hydronephrosis with urosepsis,  status post right double-J stent placement, questionable history of stone. POSTOPERATIVE DIAGNOSES:  History of right hydronephrosis with urosepsis,  status post right double-J stent placement, questionable history of stone. OPERATION PERFORMED:  Cystoscopy, right ureteroscopy, right double-J stent  removal.    SURGEON:  Edda Saldaña MD    ANESTHESIA:  General.    INDICATIONS:  The patient is a 71-year-old male who was admitted with acute  pyelonephritis 3 weeks ago at [facility].    The patient had sepsis secondary to Staphylococcus aureus urinary tract infection. The patient currently is on IV antibiotics. The patient had an emergent  right double-J stent placed at his initial admission due to potential urosepsis from the right  collecting system due to weight questionable calcification in the distal ureter. CT scan done after discharge showed questionable calcification along the stent. The patient the patient will now have the stent  removed. There was question of a stone in the distal right ureter, which  will also be inspected and removed if found. OPERATIVE PROCEDURE:  The patient had a preoperative CT scan which showed  questionable stone in the distal right ureter. He is currently on IV  antibiotics at home. He was taken to the operating room and placed on the  operating table in the dorsal lithotomy position after initiation of  general anesthetic.   The patient has severe

## 2017-12-18 ENCOUNTER — OFFICE VISIT (OUTPATIENT)
Dept: SURGERY | Age: 52
End: 2017-12-18

## 2017-12-18 VITALS
SYSTOLIC BLOOD PRESSURE: 136 MMHG | BODY MASS INDEX: 25.74 KG/M2 | DIASTOLIC BLOOD PRESSURE: 82 MMHG | HEART RATE: 88 BPM | HEIGHT: 67 IN | WEIGHT: 164 LBS

## 2017-12-18 LAB
ANION GAP SERPL CALCULATED.3IONS-SCNC: 17 MEQ/L (ref 7–13)
BASOPHILS ABSOLUTE: 0.1 K/UL (ref 0–0.2)
BASOPHILS RELATIVE PERCENT: 1.3 %
BUN BLDV-MCNC: 18 MG/DL (ref 6–20)
CALCIUM SERPL-MCNC: 8.7 MG/DL (ref 8.6–10.2)
CHLORIDE BLD-SCNC: 98 MEQ/L (ref 98–107)
CO2: 23 MEQ/L (ref 22–29)
CREAT SERPL-MCNC: 0.96 MG/DL (ref 0.7–1.2)
EOSINOPHILS ABSOLUTE: 0.6 K/UL (ref 0–0.7)
EOSINOPHILS RELATIVE PERCENT: 8.9 %
GFR AFRICAN AMERICAN: >60
GFR NON-AFRICAN AMERICAN: >60
GLUCOSE BLD-MCNC: 181 MG/DL
GLUCOSE BLD-MCNC: 272 MG/DL (ref 74–109)
HCT VFR BLD CALC: 34.2 % (ref 42–52)
HEMOGLOBIN: 11.7 G/DL (ref 14–18)
LYMPHOCYTES ABSOLUTE: 1.1 K/UL (ref 1–4.8)
LYMPHOCYTES RELATIVE PERCENT: 16.3 %
MCH RBC QN AUTO: 29.2 PG (ref 27–31.3)
MCHC RBC AUTO-ENTMCNC: 34.1 % (ref 33–37)
MCV RBC AUTO: 85.7 FL (ref 80–100)
MONOCYTES ABSOLUTE: 0.6 K/UL (ref 0.2–0.8)
MONOCYTES RELATIVE PERCENT: 8.1 %
NEUTROPHILS ABSOLUTE: 4.5 K/UL (ref 1.4–6.5)
NEUTROPHILS RELATIVE PERCENT: 65.4 %
PDW BLD-RTO: 17.1 % (ref 11.5–14.5)
PLATELET # BLD: 282 K/UL (ref 130–400)
POTASSIUM SERPL-SCNC: 5 MEQ/L (ref 3.5–5.1)
RBC # BLD: 3.99 M/UL (ref 4.7–6.1)
SODIUM BLD-SCNC: 138 MEQ/L (ref 132–144)
WBC # BLD: 6.9 K/UL (ref 4.8–10.8)

## 2017-12-18 PROCEDURE — 82962 GLUCOSE BLOOD TEST: CPT | Performed by: INTERNAL MEDICINE

## 2017-12-18 PROCEDURE — 99213 OFFICE O/P EST LOW 20 MIN: CPT | Performed by: INTERNAL MEDICINE

## 2017-12-18 RX ORDER — LISINOPRIL 10 MG/1
10 TABLET ORAL DAILY
Qty: 30 TABLET | Refills: 3 | Status: SHIPPED | OUTPATIENT
Start: 2017-12-18 | End: 2018-01-23 | Stop reason: SDUPTHER

## 2017-12-20 ENCOUNTER — TELEPHONE (OUTPATIENT)
Dept: INFECTIOUS DISEASES | Age: 52
End: 2017-12-20

## 2017-12-20 NOTE — TELEPHONE ENCOUNTER
Matthias's spouse called to inquire and confirm EOT with IV Abx. Advised will have to check the doctor's orders. Borderick he has [facility] and [employer] [employer]. She thought it would be until 12/27/17. Reviewed patient's notes, Patient last seen with Dr Madhav Dennison on 12/12/17 and the orders are to continue for 3 more weeks. Call to [employer], spoke with Janay Frye. He states they did Rcv the update and will fill the order until 1-4-17, at which time the patient has next follow up with Dr Madhav Dennison. Will return call to Patient/Patient spouse.

## 2017-12-26 LAB
ANION GAP SERPL CALCULATED.3IONS-SCNC: 14 MEQ/L (ref 7–13)
BASOPHILS ABSOLUTE: 0 K/UL (ref 0–0.2)
BASOPHILS RELATIVE PERCENT: 0.8 %
BUN BLDV-MCNC: 19 MG/DL (ref 6–20)
CALCIUM SERPL-MCNC: 9.3 MG/DL (ref 8.6–10.2)
CHLORIDE BLD-SCNC: 98 MEQ/L (ref 98–107)
CO2: 24 MEQ/L (ref 22–29)
CREAT SERPL-MCNC: 1.19 MG/DL (ref 0.7–1.2)
EOSINOPHILS ABSOLUTE: 0.4 K/UL (ref 0–0.7)
EOSINOPHILS RELATIVE PERCENT: 7.6 %
GFR AFRICAN AMERICAN: >60
GFR NON-AFRICAN AMERICAN: >60
GLUCOSE BLD-MCNC: 244 MG/DL (ref 74–109)
HCT VFR BLD CALC: 33.5 % (ref 42–52)
HEMOGLOBIN: 11.4 G/DL (ref 14–18)
LYMPHOCYTES ABSOLUTE: 1.1 K/UL (ref 1–4.8)
LYMPHOCYTES RELATIVE PERCENT: 19.9 %
MCH RBC QN AUTO: 29.4 PG (ref 27–31.3)
MCHC RBC AUTO-ENTMCNC: 34.2 % (ref 33–37)
MCV RBC AUTO: 86 FL (ref 80–100)
MONOCYTES ABSOLUTE: 0.5 K/UL (ref 0.2–0.8)
MONOCYTES RELATIVE PERCENT: 9.4 %
NEUTROPHILS ABSOLUTE: 3.4 K/UL (ref 1.4–6.5)
NEUTROPHILS RELATIVE PERCENT: 62.3 %
PDW BLD-RTO: 17.3 % (ref 11.5–14.5)
PLATELET # BLD: 240 K/UL (ref 130–400)
POTASSIUM SERPL-SCNC: 4.8 MEQ/L (ref 3.5–5.1)
RBC # BLD: 3.89 M/UL (ref 4.7–6.1)
SODIUM BLD-SCNC: 136 MEQ/L (ref 132–144)
WBC # BLD: 5.5 K/UL (ref 4.8–10.8)

## 2017-12-26 NOTE — PROGRESS NOTES
Subjective:      Patient ID: Yosvany Bell is a 47 y.o. male. Diabetes   He presents for his follow-up diabetic visit. He has type 2 diabetes mellitus. His disease course has been improving. Associated symptoms include fatigue. Symptoms are improving. Risk factors for coronary artery disease include diabetes mellitus and male sex. Current diabetic treatment includes insulin injections (humulin n plus r ). He is compliant with treatment most of the time. He is currently taking insulin pre-breakfast, pre-lunch, pre-dinner and at bedtime. He is following a generally healthy diet. His breakfast blood glucose range is generally 140-180 mg/dl. His highest blood glucose is >200 mg/dl. His overall blood glucose range is 180-200 mg/dl. (Lab Results       Component                Value               Date                       LABA1C                   11.9 (H)            11/05/2017            Recent admission at [facility] reviewed consult ) An ACE inhibitor/angiotensin II receptor blocker is being taken. Patient Active Problem List   Diagnosis    Acute pyelonephritis    Sepsis ([address]    Uncontrolled type 2 diabetes mellitus with complication, with long-term current use of insulin ([address]    MSSA (methicillin susceptible Staphylococcus aureus) infection    Hydronephrosis with urinary obstruction due to ureteral calculus    Staphylococcus aureus bacteremia with sepsis ([address]    Osteomyelitis of cervical spine ([address]    Discitis of cervical region    Pyelonephritis    Urethral stricture     Social History     Social History    Marital status:      Spouse name: N/A    Number of children: N/A    Years of education: N/A     Occupational History    Not on file.      Social History Main Topics    Smoking status: Never Smoker    Smokeless tobacco: Never Used    Alcohol use Not on file    Drug use: Unknown    Sexual activity: Not on file     Other Topics Concern    Not on file     Social History Narrative   

## 2017-12-27 RX ORDER — BLOOD SUGAR DIAGNOSTIC
1 STRIP MISCELLANEOUS 4 TIMES DAILY
Qty: 150 EACH | Refills: 3 | Status: SHIPPED | OUTPATIENT
Start: 2017-12-27 | End: 2022-09-16

## 2018-01-02 LAB
ANION GAP SERPL CALCULATED.3IONS-SCNC: 16 MEQ/L (ref 7–13)
BASOPHILS ABSOLUTE: 0.1 K/UL (ref 0–0.2)
BASOPHILS RELATIVE PERCENT: 0.9 %
BUN BLDV-MCNC: 22 MG/DL (ref 6–20)
CALCIUM SERPL-MCNC: 9 MG/DL (ref 8.6–10.2)
CHLORIDE BLD-SCNC: 103 MEQ/L (ref 98–107)
CO2: 21 MEQ/L (ref 22–29)
CREAT SERPL-MCNC: 1.41 MG/DL (ref 0.7–1.2)
EOSINOPHILS ABSOLUTE: 0.4 K/UL (ref 0–0.7)
EOSINOPHILS RELATIVE PERCENT: 5.9 %
GFR AFRICAN AMERICAN: >60
GFR NON-AFRICAN AMERICAN: 53
GLUCOSE BLD-MCNC: 121 MG/DL (ref 74–109)
HCT VFR BLD CALC: 36.1 % (ref 42–52)
HEMOGLOBIN: 12.3 G/DL (ref 14–18)
LYMPHOCYTES ABSOLUTE: 1.4 K/UL (ref 1–4.8)
LYMPHOCYTES RELATIVE PERCENT: 20.4 %
MCH RBC QN AUTO: 29.2 PG (ref 27–31.3)
MCHC RBC AUTO-ENTMCNC: 34.1 % (ref 33–37)
MCV RBC AUTO: 85.6 FL (ref 80–100)
MONOCYTES ABSOLUTE: 0.5 K/UL (ref 0.2–0.8)
MONOCYTES RELATIVE PERCENT: 7.4 %
NEUTROPHILS ABSOLUTE: 4.6 K/UL (ref 1.4–6.5)
NEUTROPHILS RELATIVE PERCENT: 65.4 %
PDW BLD-RTO: 17.3 % (ref 11.5–14.5)
PLATELET # BLD: 239 K/UL (ref 130–400)
POTASSIUM SERPL-SCNC: 4.8 MEQ/L (ref 3.5–5.1)
RBC # BLD: 4.21 M/UL (ref 4.7–6.1)
SODIUM BLD-SCNC: 140 MEQ/L (ref 132–144)
WBC # BLD: 7.1 K/UL (ref 4.8–10.8)

## 2018-01-04 ENCOUNTER — OFFICE VISIT (OUTPATIENT)
Dept: INFECTIOUS DISEASES | Age: 53
End: 2018-01-04

## 2018-01-04 VITALS
SYSTOLIC BLOOD PRESSURE: 143 MMHG | HEART RATE: 104 BPM | DIASTOLIC BLOOD PRESSURE: 84 MMHG | WEIGHT: 167.8 LBS | RESPIRATION RATE: 18 BRPM | HEIGHT: 67 IN | TEMPERATURE: 99.4 F | BODY MASS INDEX: 26.34 KG/M2

## 2018-01-04 DIAGNOSIS — N12 PYELONEPHRITIS: ICD-10-CM

## 2018-01-04 DIAGNOSIS — I33.0 ACUTE BACTERIAL ENDOCARDITIS: ICD-10-CM

## 2018-01-04 DIAGNOSIS — A49.01 MSSA (METHICILLIN SUSCEPTIBLE STAPHYLOCOCCUS AUREUS) INFECTION: Primary | ICD-10-CM

## 2018-01-04 PROCEDURE — 99213 OFFICE O/P EST LOW 20 MIN: CPT | Performed by: INTERNAL MEDICINE

## 2018-01-05 ENCOUNTER — OFFICE VISIT (OUTPATIENT)
Dept: UROLOGY | Age: 53
End: 2018-01-05

## 2018-01-05 VITALS
DIASTOLIC BLOOD PRESSURE: 70 MMHG | HEART RATE: 100 BPM | SYSTOLIC BLOOD PRESSURE: 126 MMHG | HEIGHT: 67 IN | BODY MASS INDEX: 26.21 KG/M2 | WEIGHT: 167 LBS

## 2018-01-05 DIAGNOSIS — N10 ACUTE PYELONEPHRITIS: Primary | ICD-10-CM

## 2018-01-05 LAB
BILIRUBIN, POC: ABNORMAL
BLOOD URINE, POC: ABNORMAL
CLARITY, POC: CLEAR
COLOR, POC: YELLOW
GLUCOSE URINE, POC: ABNORMAL
KETONES, POC: ABNORMAL
LEUKOCYTE EST, POC: ABNORMAL
NITRITE, POC: ABNORMAL
PH, POC: 5.5
PROTEIN, POC: ABNORMAL
SPECIFIC GRAVITY, POC: 1.02
UROBILINOGEN, POC: 0.2

## 2018-01-05 PROCEDURE — 99213 OFFICE O/P EST LOW 20 MIN: CPT | Performed by: UROLOGY

## 2018-01-05 PROCEDURE — 81003 URINALYSIS AUTO W/O SCOPE: CPT | Performed by: UROLOGY

## 2018-01-05 NOTE — PROGRESS NOTES
[facility] EVALUATION NOTE                                                 H&P                                                                                                                                                 Reason for Visit  Urosepsis    History of Present Illness  History of urosepsis secondary to distal right ureteral stone  Stent removed patient stone free  Patient has phimosis and will be considered for a a circumcision in 3 months      Urologic Review of Systems/Symptoms  Denies hematuria  Denies dysuria  Denies incontinence  Denies flank pain  Other Urologic: Voiding without difficulty    Review of Systems  Head and neck: No issues/reviewed  Cardiac: No recent issues/reviewed  Pulmonary: No issues/reviewed  Gastrointestinal: No issues/reviewed  Neurologic: No recent issues/reviewed  Extremities: No issues/reviewed  Lymphatics: No lymphadenopathy no change  Genitourinary: See above  Skin: No issues/reviewed  Hospitalization: Recently finished IV antibiotics      All 14 categories of Review of Systems otherwise reviewed no other findings reported. History reviewed. No pertinent past medical history.   Past Surgical History:   Procedure Laterality Date    CYSTOSCOPY Right 12/13/2017    CYSTOSCOPY RIGHT  RETROGRADE PYELOGRAM AND REMOVAL OF RIGHT DJ STENT (LABS DONE 11/27) performed by Roseanna Kong MD at [address] / Mirta Talbot / Amber Vargas Right 11/8/2017    CYSTOSCOPY RIGHT DOUBLE J  STENT INSERTION performed by Roseanna Kong MD at [facility]     Social History     Social History    Marital status:      Spouse name: N/A    Number of children: N/A    Years of education: N/A     Social History Main Topics    Smoking status: Never Smoker    Smokeless tobacco: Never Used    Alcohol use Yes      Comment: occasional    Drug use: No    Sexual activity: Not Asked     Other Topics Concern    None     Social History Narrative   

## 2018-01-11 ENCOUNTER — TELEPHONE (OUTPATIENT)
Dept: INFECTIOUS DISEASES | Age: 53
End: 2018-01-11

## 2018-01-11 NOTE — TELEPHONE ENCOUNTER
Patient wife called stating that since Tuesday -0109/2018   Patient had Temp. And chille since Tuesday . Wife states patient don't have appetite ,No energy. Wife states she  don't know what to do. Temp. Tuesday 99.7  Wednesday  99.7   Thursday 99.8  I will consult Dr. Jacob Holt.

## 2018-01-12 ENCOUNTER — TELEPHONE (OUTPATIENT)
Dept: INFECTIOUS DISEASES | Age: 53
End: 2018-01-12

## 2018-01-16 ENCOUNTER — TELEPHONE (OUTPATIENT)
Dept: FAMILY MEDICINE CLINIC | Age: 53
End: 2018-01-16

## 2018-01-23 RX ORDER — LISINOPRIL 10 MG/1
10 TABLET ORAL DAILY
Qty: 30 TABLET | Refills: 0 | Status: SHIPPED | OUTPATIENT
Start: 2018-01-23 | End: 2018-03-02 | Stop reason: ALTCHOICE

## 2018-01-25 ENCOUNTER — OFFICE VISIT (OUTPATIENT)
Dept: FAMILY MEDICINE CLINIC | Age: 53
End: 2018-01-25
Payer: COMMERCIAL

## 2018-01-25 VITALS
WEIGHT: 165 LBS | OXYGEN SATURATION: 98 % | HEART RATE: 102 BPM | BODY MASS INDEX: 25.9 KG/M2 | SYSTOLIC BLOOD PRESSURE: 118 MMHG | RESPIRATION RATE: 14 BRPM | TEMPERATURE: 97.6 F | HEIGHT: 67 IN | DIASTOLIC BLOOD PRESSURE: 76 MMHG

## 2018-01-25 DIAGNOSIS — I10 ESSENTIAL HYPERTENSION: ICD-10-CM

## 2018-01-25 DIAGNOSIS — Z12.5 PROSTATE CANCER SCREENING: ICD-10-CM

## 2018-01-25 DIAGNOSIS — K08.9 POOR DENTITION: ICD-10-CM

## 2018-01-25 DIAGNOSIS — R06.09 EXERTIONAL DYSPNEA: Primary | ICD-10-CM

## 2018-01-25 PROCEDURE — 99213 OFFICE O/P EST LOW 20 MIN: CPT | Performed by: FAMILY MEDICINE

## 2018-01-25 PROCEDURE — 94010 BREATHING CAPACITY TEST: CPT | Performed by: FAMILY MEDICINE

## 2018-01-25 ASSESSMENT — ENCOUNTER SYMPTOMS: ABDOMINAL PAIN: 0

## 2018-01-25 NOTE — PROGRESS NOTES
Subjective:      Patient ID: Alexys Covington is a 47 y.o. male    HPI  Here in follow up of sorts. Off antibiotics currently fpr sepsis and ?endocarditis. Has noted some sob with exertion recently over the last 2 months. No chest pain. Off antibiotics x 2 weeks   Review of Systems   Constitutional: Negative for unexpected weight change. Cardiovascular: Negative for palpitations and leg swelling. Gastrointestinal: Negative for abdominal pain. Skin: Negative for rash. Reviewed allergy, medical, social, surgical, family and med list changes and updated   Files  Social History     Social History    Marital status:      Spouse name: N/A    Number of children: N/A    Years of education: N/A     Social History Main Topics    Smoking status: Never Smoker    Smokeless tobacco: Never Used    Alcohol use Yes      Comment: occasional    Drug use: No    Sexual activity: Not Asked     Other Topics Concern    None     Social History Narrative    None     Current Outpatient Prescriptions   Medication Sig Dispense Refill    lisinopril (PRINIVIL;ZESTRIL) 10 MG tablet Take 1 tablet by mouth daily 30 tablet 0    insulin NPH (HUMULIN N) 100 UNIT/ML injection vial Inject 45 Units into the skin nightly 2 vial 3    insulin regular (HUMULIN R) 100 UNIT/ML injection Inject 16 units tid with meals 2 vial 3    glucose blood VI test strips (FREESTYLE LITE) strip Test 3x daily DX: E11.65, IDDM 100 each 6    Insulin Syringe-Needle U-100 31G X 5/16\" 1 ML MISC 1 each by Does not apply route 4 times daily 150 each 3    FREESTYLE LANCETS MISC 1 each by Does not apply route 3 times daily DX: E11.65, IDDM 100 each 6    Insulin Syringe-Needle U-100 31G X 5/16\" 1 ML MISC 1 each by Does not apply route daily for 14 days 100 each 0     No current facility-administered medications for this visit. History reviewed. No pertinent family history. History reviewed. No pertinent past medical history.   Objective: /76   Pulse 102   Temp 97.6 °F (36.4 °C)   Resp 14   Ht 5' 7\" (1.702 m)   Wt 165 lb (74.8 kg)   BMI 25.84 kg/m²     Physical Exam  Neck: No masses. No adenopathy. No thyroid asymmetry. Lungs:clear and equal breath sounds. No wheezes or rales. Heart:rate reg. No murmur. No gallops     Extremities:no edema in either leg  Gen: In no acute distress  Heent; tongue without lesions nor lip lesion. Several dental carries with overall poor dentition. No gum swelling or obvious abscess. Assessment:      1. Exertional dyspnea  XR CHEST STANDARD (2 VW)    CO NONINVASV OXYGEN SATUR;SINGLE    78595 - CO BREATHING CAPACITY TEST   2. Poor dentition     3.  Essential hypertension         Plan:   Needs to see dentist promptly    Orders Placed This Encounter   Procedures    XR CHEST STANDARD (2 VW)     Standing Status:   Future     Number of Occurrences:   1     Standing Expiration Date:   1/25/2019   270-05 [facility] DO T[place]) - [facility]     Referral Priority:   Urgent     Referral Type:   Consult for Advice and Opinion     Referral Reason:   Specialty Services Required     Referred to Provider:   Homa Hooker DO     Requested Specialty:   Cardiology     Number of Visits Requested:   3535 Tomasa Collazo MD     Referral Priority:   Routine     Referral Type:   Consult for Advice and Opinion     Referral Reason:   Specialty Services Required     Referred to Provider:   Marvel Amaya MD     Requested Specialty:   Pulmonology     Number of Visits Requested:   1    CO NONINVASV OXYGEN SATUR;SINGLE    28501 - CO BREATHING CAPACITY TEST   fasting blood work soon and f/u after done

## 2018-01-26 DIAGNOSIS — I10 ESSENTIAL HYPERTENSION: ICD-10-CM

## 2018-01-26 DIAGNOSIS — Z12.5 PROSTATE CANCER SCREENING: ICD-10-CM

## 2018-01-26 LAB
ALBUMIN SERPL-MCNC: 4.1 G/DL (ref 3.9–4.9)
ALP BLD-CCNC: 129 U/L (ref 35–104)
ALT SERPL-CCNC: 18 U/L (ref 0–41)
ANION GAP SERPL CALCULATED.3IONS-SCNC: 15 MEQ/L (ref 7–13)
AST SERPL-CCNC: 18 U/L (ref 0–40)
BASOPHILS ABSOLUTE: 0.1 K/UL (ref 0–0.2)
BASOPHILS RELATIVE PERCENT: 0.9 %
BILIRUB SERPL-MCNC: 0.3 MG/DL (ref 0–1.2)
BUN BLDV-MCNC: 33 MG/DL (ref 6–20)
CALCIUM SERPL-MCNC: 9.5 MG/DL (ref 8.6–10.2)
CHLORIDE BLD-SCNC: 101 MEQ/L (ref 98–107)
CHOLESTEROL, TOTAL: 190 MG/DL (ref 0–199)
CO2: 22 MEQ/L (ref 22–29)
CREAT SERPL-MCNC: 1.21 MG/DL (ref 0.7–1.2)
EOSINOPHILS ABSOLUTE: 0.4 K/UL (ref 0–0.7)
EOSINOPHILS RELATIVE PERCENT: 4.8 %
GFR AFRICAN AMERICAN: >60
GFR NON-AFRICAN AMERICAN: >60
GLOBULIN: 3.3 G/DL (ref 2.3–3.5)
GLUCOSE BLD-MCNC: 193 MG/DL (ref 74–109)
HCT VFR BLD CALC: 36.4 % (ref 42–52)
HDLC SERPL-MCNC: 34 MG/DL (ref 40–59)
HEMOGLOBIN: 12.3 G/DL (ref 14–18)
LDL CHOLESTEROL CALCULATED: 93 MG/DL (ref 0–129)
LYMPHOCYTES ABSOLUTE: 1.7 K/UL (ref 1–4.8)
LYMPHOCYTES RELATIVE PERCENT: 21 %
MCH RBC QN AUTO: 29.2 PG (ref 27–31.3)
MCHC RBC AUTO-ENTMCNC: 33.7 % (ref 33–37)
MCV RBC AUTO: 86.6 FL (ref 80–100)
MONOCYTES ABSOLUTE: 0.4 K/UL (ref 0.2–0.8)
MONOCYTES RELATIVE PERCENT: 5.3 %
NEUTROPHILS ABSOLUTE: 5.4 K/UL (ref 1.4–6.5)
NEUTROPHILS RELATIVE PERCENT: 68 %
PDW BLD-RTO: 15.1 % (ref 11.5–14.5)
PLATELET # BLD: 249 K/UL (ref 130–400)
POTASSIUM SERPL-SCNC: 4.7 MEQ/L (ref 3.5–5.1)
PROSTATE SPECIFIC ANTIGEN: 0.47 NG/ML (ref 0–3.89)
RBC # BLD: 4.21 M/UL (ref 4.7–6.1)
SODIUM BLD-SCNC: 138 MEQ/L (ref 132–144)
TOTAL PROTEIN: 7.4 G/DL (ref 6.4–8.1)
TRIGL SERPL-MCNC: 317 MG/DL (ref 0–200)
WBC # BLD: 8 K/UL (ref 4.8–10.8)

## 2018-02-02 ENCOUNTER — OFFICE VISIT (OUTPATIENT)
Dept: CARDIOLOGY CLINIC | Age: 53
End: 2018-02-02
Payer: COMMERCIAL

## 2018-02-02 ENCOUNTER — OFFICE VISIT (OUTPATIENT)
Dept: FAMILY MEDICINE CLINIC | Age: 53
End: 2018-02-02
Payer: COMMERCIAL

## 2018-02-02 VITALS
SYSTOLIC BLOOD PRESSURE: 112 MMHG | HEART RATE: 96 BPM | HEIGHT: 67 IN | BODY MASS INDEX: 26.3 KG/M2 | DIASTOLIC BLOOD PRESSURE: 80 MMHG | WEIGHT: 167.6 LBS

## 2018-02-02 VITALS
DIASTOLIC BLOOD PRESSURE: 78 MMHG | OXYGEN SATURATION: 97 % | BODY MASS INDEX: 26.37 KG/M2 | HEIGHT: 67 IN | TEMPERATURE: 98.1 F | HEART RATE: 74 BPM | WEIGHT: 168 LBS | SYSTOLIC BLOOD PRESSURE: 130 MMHG | RESPIRATION RATE: 18 BRPM

## 2018-02-02 DIAGNOSIS — R06.02 SOB (SHORTNESS OF BREATH): Primary | ICD-10-CM

## 2018-02-02 DIAGNOSIS — E78.2 MIXED HYPERLIPIDEMIA: ICD-10-CM

## 2018-02-02 DIAGNOSIS — Z12.11 COLON CANCER SCREENING: ICD-10-CM

## 2018-02-02 DIAGNOSIS — E78.1 HYPERTRIGLYCERIDEMIA: ICD-10-CM

## 2018-02-02 DIAGNOSIS — R06.09 DOE (DYSPNEA ON EXERTION): ICD-10-CM

## 2018-02-02 DIAGNOSIS — D64.9 ANEMIA, UNSPECIFIED TYPE: Primary | ICD-10-CM

## 2018-02-02 DIAGNOSIS — R74.8 ALKALINE PHOSPHATASE ELEVATION: ICD-10-CM

## 2018-02-02 PROCEDURE — G8427 DOCREV CUR MEDS BY ELIG CLIN: HCPCS | Performed by: FAMILY MEDICINE

## 2018-02-02 PROCEDURE — 99204 OFFICE O/P NEW MOD 45 MIN: CPT | Performed by: INTERNAL MEDICINE

## 2018-02-02 PROCEDURE — 1036F TOBACCO NON-USER: CPT | Performed by: INTERNAL MEDICINE

## 2018-02-02 PROCEDURE — G8427 DOCREV CUR MEDS BY ELIG CLIN: HCPCS | Performed by: INTERNAL MEDICINE

## 2018-02-02 PROCEDURE — 3017F COLORECTAL CA SCREEN DOC REV: CPT | Performed by: INTERNAL MEDICINE

## 2018-02-02 PROCEDURE — 99213 OFFICE O/P EST LOW 20 MIN: CPT | Performed by: FAMILY MEDICINE

## 2018-02-02 PROCEDURE — G8484 FLU IMMUNIZE NO ADMIN: HCPCS | Performed by: INTERNAL MEDICINE

## 2018-02-02 PROCEDURE — G8419 CALC BMI OUT NRM PARAM NOF/U: HCPCS | Performed by: INTERNAL MEDICINE

## 2018-02-02 PROCEDURE — G8484 FLU IMMUNIZE NO ADMIN: HCPCS | Performed by: FAMILY MEDICINE

## 2018-02-02 PROCEDURE — 93000 ELECTROCARDIOGRAM COMPLETE: CPT | Performed by: INTERNAL MEDICINE

## 2018-02-02 PROCEDURE — 3017F COLORECTAL CA SCREEN DOC REV: CPT | Performed by: FAMILY MEDICINE

## 2018-02-02 PROCEDURE — 1036F TOBACCO NON-USER: CPT | Performed by: FAMILY MEDICINE

## 2018-02-02 PROCEDURE — G8419 CALC BMI OUT NRM PARAM NOF/U: HCPCS | Performed by: FAMILY MEDICINE

## 2018-02-02 ASSESSMENT — ENCOUNTER SYMPTOMS: ABDOMINAL PAIN: 0

## 2018-02-02 NOTE — PROGRESS NOTES
Chief Complaint   Patient presents with    Shortness of Breath       2-2-18: Patient presents for initial medical evaluation. Patient is followed on a regular basis by Dr. Ingrid Navarro MD. Domo Bone for the past few months since his hospitalization in 11/2017. He had urosepsis and  was hospitalized for 13 days, noted to have DM as well. S/p ECHO with normal LVf. S/p FILI with no endocarditis or shunts. States he's been getting really SOB with walking into the store, picking up his granddaughter. Denies any hx of thromboembolic disease. Pt denies chest pain, nausea, vomiting, diarrhea, constipation, motor weakness, insomnia, weight loss, syncope, dizziness, lightheadedness, palpitations, PND, orthopnea, or claudication. No LE edema. Does have some chills.            Patient Active Problem List   Diagnosis    Acute pyelonephritis    Sepsis ([address]    Uncontrolled type 2 diabetes mellitus with complication, with long-term current use of insulin ([address]    MSSA (methicillin susceptible Staphylococcus aureus) infection    Hydronephrosis with urinary obstruction due to ureteral calculus    Staphylococcus aureus bacteremia with sepsis ([address]    Osteomyelitis of cervical spine ([address]    Discitis of cervical region    Pyelonephritis    Urethral stricture    BUCKLEY (dyspnea on exertion)    Mixed hyperlipidemia       Past Surgical History:   Procedure Laterality Date    CYSTOSCOPY Right 12/13/2017    CYSTOSCOPY RIGHT  RETROGRADE PYELOGRAM AND REMOVAL OF RIGHT DJ STENT (LABS DONE 11/27) performed by Elaine Lomas MD at [address] / [facility] / STONE Right 11/8/2017    CYSTOSCOPY RIGHT DOUBLE J  STENT INSERTION performed by Elaine Lomas MD at [address] [facility]       Social History     Social History    Marital status:      Spouse name: N/A    Number of children: N/A    Years of education: N/A     Social History Main Topics    Smoking status: Never Smoker   

## 2018-02-02 NOTE — PROGRESS NOTES
Subjective:      Patient ID: Jennifer Stevens is a 47 y.o. male. HPI   Treatment Adherence:   Medication compliance:  {Desc; compliance:5303::\"compliant most of the time\"}  Diet compliance:  {Desc; compliance:5303::\"compliant most of the time\"}  Weight trend: {INCREASING/DECREASING/STABLE:80019}  Current exercise: {EXERCISE JKQU:095593705}  What might prevent you from meeting your goal?: {Barriers to success:64404}  Patient plan for overcoming barriers: {COMMENT/NA:655269153}     Patient Confidence: {NUMBERS 1-10:44854}/10      Diabetes Mellitus Type 2: Current symptoms/problems include {Symptoms; diabetes:78543::\"none\"}. Home blood sugar records:  {diabetes glucometry results:92129}  Any episodes of hypoglycemia? {yes***/no:86012}  Eye exam current (within one year): {yes/no/unknown:74}  Tobacco history: He  reports that he has never smoked. He has never used smokeless tobacco.   Daily Aspirin? {yes no:853276::\"Yes\"}  Known diabetic complications: {diabetes complications:1215}    Hypertension:  Home blood pressure monitoring: {NO/YES:5758550537::\"No\"}. He {is/is not:9024} adherent to a low sodium diet. Patient {denies/complains:86527} {Symptoms of Hypertension, Denies:34815}. Antihypertensive medication side effects: {Hypertension med side effects:5728::\"no medication side effects noted\"}. Use of agents associated with hypertension: {bp agents assoc with hypertension:511::\"none\"}. Hyperlipidemia:  No new myalgias or GI upset on {RP HYPERLIPIDEMIA MEDS:27498}.        Lab Results   Component Value Date    LABA1C 11.9 (H) 11/05/2017     Lab Results   Component Value Date    CREATININE 1.21 (H) 01/26/2018     Lab Results   Component Value Date    ALT 18 01/26/2018    AST 18 01/26/2018     Lab Results   Component Value Date    CHOL 190 01/26/2018    TRIG 317 (H) 01/26/2018    HDL 34 (L) 01/26/2018    LDLCALC 93 01/26/2018            Review of Systems    Objective:   Physical Exam    Assessment / Plan:

## 2018-02-05 ENCOUNTER — TELEPHONE (OUTPATIENT)
Dept: CARDIOLOGY CLINIC | Age: 53
End: 2018-02-05

## 2018-02-06 ENCOUNTER — OFFICE VISIT (OUTPATIENT)
Dept: INFECTIOUS DISEASES | Age: 53
End: 2018-02-06
Payer: COMMERCIAL

## 2018-02-06 VITALS
WEIGHT: 168.4 LBS | RESPIRATION RATE: 16 BRPM | BODY MASS INDEX: 26.43 KG/M2 | SYSTOLIC BLOOD PRESSURE: 146 MMHG | HEART RATE: 105 BPM | HEIGHT: 67 IN | TEMPERATURE: 97.5 F | DIASTOLIC BLOOD PRESSURE: 82 MMHG

## 2018-02-06 DIAGNOSIS — A49.01 MSSA (METHICILLIN SUSCEPTIBLE STAPHYLOCOCCUS AUREUS) INFECTION: Primary | ICD-10-CM

## 2018-02-06 DIAGNOSIS — I33.0 ACUTE BACTERIAL ENDOCARDITIS: ICD-10-CM

## 2018-02-06 DIAGNOSIS — N12 PYELONEPHRITIS: ICD-10-CM

## 2018-02-06 PROCEDURE — G8427 DOCREV CUR MEDS BY ELIG CLIN: HCPCS | Performed by: INTERNAL MEDICINE

## 2018-02-06 PROCEDURE — G8419 CALC BMI OUT NRM PARAM NOF/U: HCPCS | Performed by: INTERNAL MEDICINE

## 2018-02-06 PROCEDURE — G8484 FLU IMMUNIZE NO ADMIN: HCPCS | Performed by: INTERNAL MEDICINE

## 2018-02-06 PROCEDURE — 3017F COLORECTAL CA SCREEN DOC REV: CPT | Performed by: INTERNAL MEDICINE

## 2018-02-06 PROCEDURE — 1036F TOBACCO NON-USER: CPT | Performed by: INTERNAL MEDICINE

## 2018-02-06 PROCEDURE — 99212 OFFICE O/P EST SF 10 MIN: CPT | Performed by: INTERNAL MEDICINE

## 2018-02-06 ASSESSMENT — ENCOUNTER SYMPTOMS
GASTROINTESTINAL NEGATIVE: 1
RESPIRATORY NEGATIVE: 1

## 2018-02-08 NOTE — TELEPHONE ENCOUNTER
We stopped ACEI due to renal insuffiency.    Please place him on coreg 6.25mg BID and I will see him after stress test.

## 2018-02-09 ENCOUNTER — TELEPHONE (OUTPATIENT)
Dept: CARDIOLOGY CLINIC | Age: 53
End: 2018-02-09

## 2018-02-09 RX ORDER — CARVEDILOL 6.25 MG/1
6.25 TABLET ORAL 2 TIMES DAILY WITH MEALS
Qty: 60 TABLET | Refills: 3 | Status: SHIPPED | OUTPATIENT
Start: 2018-02-09 | End: 2018-03-02 | Stop reason: SDUPTHER

## 2018-02-12 DIAGNOSIS — R06.02 SOB (SHORTNESS OF BREATH): ICD-10-CM

## 2018-02-12 LAB — D DIMER: 0.21 MG/L FEU (ref 0–0.5)

## 2018-02-13 ENCOUNTER — TELEPHONE (OUTPATIENT)
Dept: CARDIOLOGY CLINIC | Age: 53
End: 2018-02-13

## 2018-02-21 ENCOUNTER — HOSPITAL ENCOUNTER (OUTPATIENT)
Dept: NUCLEAR MEDICINE | Age: 53
Discharge: HOME | End: 2018-02-23
Payer: COMMERCIAL

## 2018-02-21 DIAGNOSIS — R06.02 SOB (SHORTNESS OF BREATH): ICD-10-CM

## 2018-02-21 PROCEDURE — A9502 TC99M TETROFOSMIN: HCPCS | Performed by: INTERNAL MEDICINE

## 2018-02-21 PROCEDURE — 78452 HT MUSCLE IMAGE SPECT MULT: CPT

## 2018-02-21 PROCEDURE — 93017 CV STRESS TEST TRACING ONLY: CPT

## 2018-02-21 PROCEDURE — 2580000003 HC RX 258: Performed by: INTERNAL MEDICINE

## 2018-02-21 PROCEDURE — 3430000000 HC RX DIAGNOSTIC RADIOPHARMACEUTICAL: Performed by: INTERNAL MEDICINE

## 2018-02-21 RX ORDER — SODIUM CHLORIDE 0.9 % (FLUSH) 0.9 %
10 SYRINGE (ML) INJECTION PRN
Status: DISCONTINUED | OUTPATIENT
Start: 2018-02-21 | End: 2018-02-24 | Stop reason: HOSPADM

## 2018-02-21 RX ADMIN — TETROFOSMIN 32.8 MILLICURIE: 0.23 INJECTION, POWDER, LYOPHILIZED, FOR SOLUTION INTRAVENOUS at 09:35

## 2018-02-21 RX ADMIN — TETROFOSMIN 10.6 MILLICURIE: 0.23 INJECTION, POWDER, LYOPHILIZED, FOR SOLUTION INTRAVENOUS at 08:15

## 2018-02-21 RX ADMIN — Medication 10 ML: at 08:15

## 2018-02-21 RX ADMIN — Medication 10 ML: at 10:34

## 2018-03-01 ENCOUNTER — OFFICE VISIT (OUTPATIENT)
Dept: ENDOCRINOLOGY | Age: 53
End: 2018-03-01
Payer: COMMERCIAL

## 2018-03-01 VITALS
DIASTOLIC BLOOD PRESSURE: 86 MMHG | WEIGHT: 171 LBS | HEART RATE: 94 BPM | HEIGHT: 67 IN | BODY MASS INDEX: 26.84 KG/M2 | SYSTOLIC BLOOD PRESSURE: 120 MMHG

## 2018-03-01 LAB
GLUCOSE BLD-MCNC: 84 MG/DL
HBA1C MFR BLD: 5.4 %

## 2018-03-01 PROCEDURE — 99213 OFFICE O/P EST LOW 20 MIN: CPT | Performed by: INTERNAL MEDICINE

## 2018-03-01 PROCEDURE — G8482 FLU IMMUNIZE ORDER/ADMIN: HCPCS | Performed by: INTERNAL MEDICINE

## 2018-03-01 PROCEDURE — 1036F TOBACCO NON-USER: CPT | Performed by: INTERNAL MEDICINE

## 2018-03-01 PROCEDURE — 3044F HG A1C LEVEL LT 7.0%: CPT | Performed by: INTERNAL MEDICINE

## 2018-03-01 PROCEDURE — 3017F COLORECTAL CA SCREEN DOC REV: CPT | Performed by: INTERNAL MEDICINE

## 2018-03-01 PROCEDURE — G8419 CALC BMI OUT NRM PARAM NOF/U: HCPCS | Performed by: INTERNAL MEDICINE

## 2018-03-01 PROCEDURE — 82962 GLUCOSE BLOOD TEST: CPT | Performed by: INTERNAL MEDICINE

## 2018-03-01 PROCEDURE — G8427 DOCREV CUR MEDS BY ELIG CLIN: HCPCS | Performed by: INTERNAL MEDICINE

## 2018-03-01 PROCEDURE — 83036 HEMOGLOBIN GLYCOSYLATED A1C: CPT | Performed by: INTERNAL MEDICINE

## 2018-03-02 ENCOUNTER — OFFICE VISIT (OUTPATIENT)
Dept: CARDIOLOGY CLINIC | Age: 53
End: 2018-03-02
Payer: COMMERCIAL

## 2018-03-02 VITALS
DIASTOLIC BLOOD PRESSURE: 70 MMHG | OXYGEN SATURATION: 98 % | HEIGHT: 67 IN | SYSTOLIC BLOOD PRESSURE: 120 MMHG | HEART RATE: 104 BPM | BODY MASS INDEX: 27.12 KG/M2 | WEIGHT: 172.8 LBS

## 2018-03-02 DIAGNOSIS — E78.2 MIXED HYPERLIPIDEMIA: Primary | ICD-10-CM

## 2018-03-02 DIAGNOSIS — R06.09 DOE (DYSPNEA ON EXERTION): ICD-10-CM

## 2018-03-02 PROCEDURE — G8419 CALC BMI OUT NRM PARAM NOF/U: HCPCS | Performed by: INTERNAL MEDICINE

## 2018-03-02 PROCEDURE — 1036F TOBACCO NON-USER: CPT | Performed by: INTERNAL MEDICINE

## 2018-03-02 PROCEDURE — 3017F COLORECTAL CA SCREEN DOC REV: CPT | Performed by: INTERNAL MEDICINE

## 2018-03-02 PROCEDURE — G8484 FLU IMMUNIZE NO ADMIN: HCPCS | Performed by: INTERNAL MEDICINE

## 2018-03-02 PROCEDURE — G8427 DOCREV CUR MEDS BY ELIG CLIN: HCPCS | Performed by: INTERNAL MEDICINE

## 2018-03-02 PROCEDURE — 99213 OFFICE O/P EST LOW 20 MIN: CPT | Performed by: INTERNAL MEDICINE

## 2018-03-02 RX ORDER — CARVEDILOL 6.25 MG/1
6.25 TABLET ORAL 2 TIMES DAILY WITH MEALS
Qty: 60 TABLET | Refills: 5 | Status: SHIPPED | OUTPATIENT
Start: 2018-03-02 | End: 2019-04-02 | Stop reason: SDUPTHER

## 2018-03-02 NOTE — PROGRESS NOTES
stools  Genito-Urinary ROS: no dysuria, trouble voiding, or hematuria  Musculoskeletal ROS: negative  Neurological ROS: no TIA or stroke symptoms  Dermatological ROS: negative    VITALS:  Blood pressure 120/70, pulse 104, height 5' 7\" (1.702 m), weight 172 lb 12.8 oz (78.4 kg), SpO2 98 %. Body mass index is 27.06 kg/m². Physical Examination:  General appearance - alert, well appearing, and in no distress  Mental status - alert, oriented to person, place, and time  Neck - Neck is supple, no JVD or carotid bruits. No thyromegaly or adenopathy. Chest - clear to auscultation, no wheezes, rales or rhonchi, symmetric air entry  Heart - normal rate, regular rhythm, normal S1, S2, no murmurs, rubs, clicks or gallops  Abdomen - soft, nontender, nondistended, no masses or organomegaly  Neurological - alert, oriented, normal speech, no focal findings or movement disorder noted  Extremities - peripheral pulses normal, no pedal edema, no clubbing or cyanosis  Skin - normal coloration and turgor, no rashes, no suspicious skin lesions noted      EKG: normal sinus rhythm, nonspecific ST and T waves changes    No orders of the defined types were placed in this encounter. ASSESSMENT:    1. Mixed hyperlipidemia     2. BUCKLEY (dyspnea on exertion)     3. Renal insuff. PLAN:     Patient will need to continue to follow up with you for their general medical care    As always, aggressive risk factor modification is strongly recommended. We should adhere to the [address] guidelines for HTN management and the NCEP ATP III guidelines for LDL-C management. Cardiac diet is always recommended with low fat, cholesterol, calories and sodium. Continue medications at current doses. Follow up with pulm    Monitor renal function with PCP. Consider LHC if pulm evaluation is normal and continues to have SOB.      Details of medical condition explained and patient was warned about adverse consequences of uncontrolled medical conditions and possible side effects of prescribed medications.

## 2018-03-12 NOTE — PROGRESS NOTES
Date:   3/1/2019    POCT Glucose    POCT glycosylated hemoglobin (Hb A1C)     Lower insulin dose   Orders Placed This Encounter   Medications    insulin NPH (HUMULIN N) 100 UNIT/ML injection vial     Si units at bedtime     Dispense:  2 vial     Refill:  3    insulin regular (HUMULIN R) 100 UNIT/ML injection     Sig: Inject 8 units tid with meals     Dispense:  2 vial     Refill:  3    glucose blood VI test strips (FREESTYLE LITE) strip     Sig: Test 3x daily DX: E11.65, IDDM     Dispense:  100 each     Refill:  6

## 2018-03-15 ENCOUNTER — OFFICE VISIT (OUTPATIENT)
Dept: GASTROENTEROLOGY | Age: 53
End: 2018-03-15
Payer: COMMERCIAL

## 2018-03-15 VITALS
DIASTOLIC BLOOD PRESSURE: 84 MMHG | RESPIRATION RATE: 12 BRPM | SYSTOLIC BLOOD PRESSURE: 135 MMHG | BODY MASS INDEX: 27 KG/M2 | WEIGHT: 172 LBS | HEIGHT: 67 IN | HEART RATE: 84 BPM

## 2018-03-15 DIAGNOSIS — D50.8 OTHER IRON DEFICIENCY ANEMIA: Primary | ICD-10-CM

## 2018-03-15 PROCEDURE — G8427 DOCREV CUR MEDS BY ELIG CLIN: HCPCS | Performed by: INTERNAL MEDICINE

## 2018-03-15 PROCEDURE — G8482 FLU IMMUNIZE ORDER/ADMIN: HCPCS | Performed by: INTERNAL MEDICINE

## 2018-03-15 PROCEDURE — 1036F TOBACCO NON-USER: CPT | Performed by: INTERNAL MEDICINE

## 2018-03-15 PROCEDURE — 99203 OFFICE O/P NEW LOW 30 MIN: CPT | Performed by: INTERNAL MEDICINE

## 2018-03-15 PROCEDURE — G8419 CALC BMI OUT NRM PARAM NOF/U: HCPCS | Performed by: INTERNAL MEDICINE

## 2018-03-15 PROCEDURE — 3017F COLORECTAL CA SCREEN DOC REV: CPT | Performed by: INTERNAL MEDICINE

## 2018-03-19 ENCOUNTER — ANESTHESIA (OUTPATIENT)
Dept: ENDOSCOPY | Age: 53
End: 2018-03-19
Payer: COMMERCIAL

## 2018-03-19 ENCOUNTER — HOSPITAL ENCOUNTER (OUTPATIENT)
Age: 53
Setting detail: OUTPATIENT SURGERY
Discharge: HOME | End: 2018-03-19
Attending: INTERNAL MEDICINE | Admitting: INTERNAL MEDICINE
Payer: COMMERCIAL

## 2018-03-19 ENCOUNTER — ANESTHESIA EVENT (OUTPATIENT)
Dept: ENDOSCOPY | Age: 53
End: 2018-03-19
Payer: COMMERCIAL

## 2018-03-19 ENCOUNTER — OFFICE VISIT (OUTPATIENT)
Dept: PULMONOLOGY | Age: 53
End: 2018-03-19
Payer: COMMERCIAL

## 2018-03-19 VITALS
HEIGHT: 67 IN | BODY MASS INDEX: 27 KG/M2 | DIASTOLIC BLOOD PRESSURE: 71 MMHG | SYSTOLIC BLOOD PRESSURE: 101 MMHG | OXYGEN SATURATION: 100 % | TEMPERATURE: 97.8 F | RESPIRATION RATE: 16 BRPM | HEART RATE: 80 BPM | WEIGHT: 172 LBS

## 2018-03-19 VITALS
OXYGEN SATURATION: 97 % | DIASTOLIC BLOOD PRESSURE: 82 MMHG | WEIGHT: 173.8 LBS | BODY MASS INDEX: 27.28 KG/M2 | HEIGHT: 67 IN | TEMPERATURE: 97.4 F | SYSTOLIC BLOOD PRESSURE: 140 MMHG | HEART RATE: 83 BPM

## 2018-03-19 VITALS
RESPIRATION RATE: 8 BRPM | SYSTOLIC BLOOD PRESSURE: 86 MMHG | OXYGEN SATURATION: 100 % | DIASTOLIC BLOOD PRESSURE: 52 MMHG

## 2018-03-19 DIAGNOSIS — R06.09 DOE (DYSPNEA ON EXERTION): Primary | ICD-10-CM

## 2018-03-19 DIAGNOSIS — R06.83 SNORING: ICD-10-CM

## 2018-03-19 DIAGNOSIS — I10 ESSENTIAL HYPERTENSION: ICD-10-CM

## 2018-03-19 DIAGNOSIS — R91.1 LUNG NODULE: ICD-10-CM

## 2018-03-19 LAB
HCT VFR BLD CALC: 39.3 % (ref 42–52)
HEMOGLOBIN: 13.6 G/DL (ref 14–18)

## 2018-03-19 PROCEDURE — 7100000010 HC PHASE II RECOVERY - FIRST 15 MIN: Performed by: INTERNAL MEDICINE

## 2018-03-19 PROCEDURE — 99204 OFFICE O/P NEW MOD 45 MIN: CPT | Performed by: INTERNAL MEDICINE

## 2018-03-19 PROCEDURE — 3700000001 HC ADD 15 MINUTES (ANESTHESIA): Performed by: INTERNAL MEDICINE

## 2018-03-19 PROCEDURE — 3609017100 HC EGD: Performed by: INTERNAL MEDICINE

## 2018-03-19 PROCEDURE — 45378 DIAGNOSTIC COLONOSCOPY: CPT | Performed by: INTERNAL MEDICINE

## 2018-03-19 PROCEDURE — 88342 IMHCHEM/IMCYTCHM 1ST ANTB: CPT

## 2018-03-19 PROCEDURE — 3700000000 HC ANESTHESIA ATTENDED CARE: Performed by: INTERNAL MEDICINE

## 2018-03-19 PROCEDURE — 3017F COLORECTAL CA SCREEN DOC REV: CPT | Performed by: INTERNAL MEDICINE

## 2018-03-19 PROCEDURE — 1036F TOBACCO NON-USER: CPT | Performed by: INTERNAL MEDICINE

## 2018-03-19 PROCEDURE — 2500000003 HC RX 250 WO HCPCS: Performed by: NURSE ANESTHETIST, CERTIFIED REGISTERED

## 2018-03-19 PROCEDURE — G8427 DOCREV CUR MEDS BY ELIG CLIN: HCPCS | Performed by: INTERNAL MEDICINE

## 2018-03-19 PROCEDURE — G8482 FLU IMMUNIZE ORDER/ADMIN: HCPCS | Performed by: INTERNAL MEDICINE

## 2018-03-19 PROCEDURE — 6360000002 HC RX W HCPCS: Performed by: NURSE ANESTHETIST, CERTIFIED REGISTERED

## 2018-03-19 PROCEDURE — 3609027000 HC COLONOSCOPY: Performed by: INTERNAL MEDICINE

## 2018-03-19 PROCEDURE — 88313 SPECIAL STAINS GROUP 2: CPT

## 2018-03-19 PROCEDURE — G8419 CALC BMI OUT NRM PARAM NOF/U: HCPCS | Performed by: INTERNAL MEDICINE

## 2018-03-19 PROCEDURE — 88305 TISSUE EXAM BY PATHOLOGIST: CPT

## 2018-03-19 PROCEDURE — 43239 EGD BIOPSY SINGLE/MULTIPLE: CPT | Performed by: INTERNAL MEDICINE

## 2018-03-19 RX ORDER — LIDOCAINE HYDROCHLORIDE 10 MG/ML
1 INJECTION, SOLUTION EPIDURAL; INFILTRATION; INTRACAUDAL; PERINEURAL
Status: DISCONTINUED | OUTPATIENT
Start: 2018-03-19 | End: 2018-03-19 | Stop reason: HOSPADM

## 2018-03-19 RX ORDER — SODIUM CHLORIDE 0.9 % (FLUSH) 0.9 %
10 SYRINGE (ML) INJECTION PRN
Status: DISCONTINUED | OUTPATIENT
Start: 2018-03-19 | End: 2018-03-19 | Stop reason: HOSPADM

## 2018-03-19 RX ORDER — SODIUM CHLORIDE 9 MG/ML
INJECTION, SOLUTION INTRAVENOUS CONTINUOUS
Status: CANCELLED | OUTPATIENT
Start: 2018-03-19

## 2018-03-19 RX ORDER — LIDOCAINE HYDROCHLORIDE 10 MG/ML
1 INJECTION, SOLUTION EPIDURAL; INFILTRATION; INTRACAUDAL; PERINEURAL
Status: CANCELLED | OUTPATIENT
Start: 2018-03-19 | End: 2018-03-19

## 2018-03-19 RX ORDER — ONDANSETRON 2 MG/ML
4 INJECTION INTRAMUSCULAR; INTRAVENOUS
Status: DISCONTINUED | OUTPATIENT
Start: 2018-03-19 | End: 2018-03-19 | Stop reason: HOSPADM

## 2018-03-19 RX ORDER — SODIUM CHLORIDE 0.9 % (FLUSH) 0.9 %
10 SYRINGE (ML) INJECTION EVERY 12 HOURS SCHEDULED
Status: DISCONTINUED | OUTPATIENT
Start: 2018-03-19 | End: 2018-03-19 | Stop reason: HOSPADM

## 2018-03-19 RX ORDER — LIDOCAINE HYDROCHLORIDE 20 MG/ML
INJECTION, SOLUTION INFILTRATION; PERINEURAL PRN
Status: DISCONTINUED | OUTPATIENT
Start: 2018-03-19 | End: 2018-03-19 | Stop reason: SDUPTHER

## 2018-03-19 RX ORDER — SODIUM CHLORIDE 0.9 % (FLUSH) 0.9 %
10 SYRINGE (ML) INJECTION PRN
Status: CANCELLED | OUTPATIENT
Start: 2018-03-19

## 2018-03-19 RX ORDER — SODIUM CHLORIDE 0.9 % (FLUSH) 0.9 %
10 SYRINGE (ML) INJECTION EVERY 12 HOURS SCHEDULED
Status: CANCELLED | OUTPATIENT
Start: 2018-03-19

## 2018-03-19 RX ORDER — SODIUM CHLORIDE 9 MG/ML
INJECTION, SOLUTION INTRAVENOUS CONTINUOUS
Status: DISCONTINUED | OUTPATIENT
Start: 2018-03-19 | End: 2018-03-19 | Stop reason: HOSPADM

## 2018-03-19 RX ORDER — PROPOFOL 10 MG/ML
INJECTION, EMULSION INTRAVENOUS PRN
Status: DISCONTINUED | OUTPATIENT
Start: 2018-03-19 | End: 2018-03-19 | Stop reason: SDUPTHER

## 2018-03-19 RX ADMIN — PROPOFOL 50 MG: 10 INJECTION, EMULSION INTRAVENOUS at 09:19

## 2018-03-19 RX ADMIN — PROPOFOL 50 MG: 10 INJECTION, EMULSION INTRAVENOUS at 09:11

## 2018-03-19 RX ADMIN — PROPOFOL 50 MG: 10 INJECTION, EMULSION INTRAVENOUS at 09:23

## 2018-03-19 RX ADMIN — PROPOFOL 100 MG: 10 INJECTION, EMULSION INTRAVENOUS at 09:00

## 2018-03-19 RX ADMIN — LIDOCAINE HYDROCHLORIDE 50 MG: 20 INJECTION, SOLUTION INFILTRATION; PERINEURAL at 09:00

## 2018-03-19 RX ADMIN — PROPOFOL 50 MG: 10 INJECTION, EMULSION INTRAVENOUS at 09:15

## 2018-03-19 RX ADMIN — PROPOFOL 50 MG: 10 INJECTION, EMULSION INTRAVENOUS at 09:04

## 2018-03-19 RX ADMIN — PROPOFOL 50 MG: 10 INJECTION, EMULSION INTRAVENOUS at 09:08

## 2018-03-19 ASSESSMENT — SLEEP AND FATIGUE QUESTIONNAIRES
HOW LIKELY ARE YOU TO NOD OFF OR FALL ASLEEP WHILE SITTING QUIETLY AFTER LUNCH WITHOUT ALCOHOL: 3
HOW LIKELY ARE YOU TO NOD OFF OR FALL ASLEEP WHEN YOU ARE A PASSENGER IN A CAR FOR AN HOUR WITHOUT A BREAK: 0
HOW LIKELY ARE YOU TO NOD OFF OR FALL ASLEEP WHILE SITTING AND TALKING TO SOMEONE: 0
HOW LIKELY ARE YOU TO NOD OFF OR FALL ASLEEP WHILE SITTING AND READING: 1
ESS TOTAL SCORE: 11
HOW LIKELY ARE YOU TO NOD OFF OR FALL ASLEEP WHILE LYING DOWN TO REST IN THE AFTERNOON WHEN CIRCUMSTANCES PERMIT: 3
HOW LIKELY ARE YOU TO NOD OFF OR FALL ASLEEP WHILE WATCHING TV: 3
HOW LIKELY ARE YOU TO NOD OFF OR FALL ASLEEP WHILE SITTING INACTIVE IN A PUBLIC PLACE: 1
HOW LIKELY ARE YOU TO NOD OFF OR FALL ASLEEP IN A CAR, WHILE STOPPED FOR A FEW MINUTES IN TRAFFIC: 0

## 2018-03-19 ASSESSMENT — PAIN - FUNCTIONAL ASSESSMENT: PAIN_FUNCTIONAL_ASSESSMENT: 0-10

## 2018-03-19 NOTE — ANESTHESIA PRE PROCEDURE
Department of Anesthesiology  Preprocedure Note       Name:  Manav Mast   Age:  47 y.o.  :  1965                                          MRN:  [id number]         Date:  3/19/2018      Surgeon: Antoine Chung):  Edda Lee MD    Procedure: Procedure(s):  EGD ESOPHAGOGASTRODUODENOSCOPY  COLONOSCOPY    Medications prior to admission:   Prior to Admission medications    Medication Sig Start Date End Date Taking?  Authorizing Provider   carvedilol (COREG) 6.25 MG tablet Take 1 tablet by mouth 2 times daily (with meals) 3/2/18  Yes Gonzalez Sutherland DO   insulin NPH (HUMULIN N) 100 UNIT/ML injection vial 30 units at bedtime 3/1/18  Yes Edie Byrne MD   insulin regular (HUMULIN R) 100 UNIT/ML injection Inject 8 units tid with meals 3/1/18  Yes Edie Byrne MD   glucose blood VI test strips (FREESTYLE LITE) strip Test 3x daily DX: E11.65, IDDM 3/1/18  Yes Edie Byrne MD   FREESTYLE LANCETS MISC 1 each by Does not apply route 3 times daily DX: E11.65, IDDM 17  Yes Edie Byrne MD   Insulin Syringe-Needle U-100 31G X 5/16\" 1 ML MISC 1 each by Does not apply route 4 times daily 17   Edie Byrne MD   Insulin Syringe-Needle U-100 31G X 5/16\" 1 ML MISC 1 each by Does not apply route daily for 14 days 11/10/17 11/24/17  Edie Byrne MD       Current medications:    Current Facility-Administered Medications   Medication Dose Route Frequency Provider Last Rate Last Dose    0.9 % sodium chloride infusion   Intravenous Continuous Edda Lee MD        sodium chloride flush 0.9 % injection 10 mL  10 mL Intravenous 2 times per day Edda Lee MD        sodium chloride flush 0.9 % injection 10 mL  10 mL Intravenous PRN Edda Lee MD        lidocaine PF 1 % injection 1 mL  1 mL Intradermal Once PRN Edda Lee MD        ondansetron [facility]) injection 4 mg  4 mg Intravenous Once PRN Edda Lee MD        ondansetron [facility]) injection 4 mg  4 mg Intravenous Once PRN (77.6 kg)     There is no height or weight on file to calculate BMI.    CBC:   Lab Results   Component Value Date    WBC 8.0 01/26/2018    RBC 4.21 01/26/2018    HGB 12.3 01/26/2018    HCT 36.4 01/26/2018    MCV 86.6 01/26/2018    RDW 15.1 01/26/2018     01/26/2018       CMP:   Lab Results   Component Value Date     01/26/2018    K 4.7 01/26/2018     01/26/2018    CO2 22 01/26/2018    BUN 33 01/26/2018    CREATININE 1.21 01/26/2018    GFRAA >60.0 01/26/2018    LABGLOM >60.0 01/26/2018    GLUCOSE 84 03/01/2018    PROT 7.4 01/26/2018    CALCIUM 9.5 01/26/2018    BILITOT 0.3 01/26/2018    ALKPHOS 129 01/26/2018    AST 18 01/26/2018    ALT 18 01/26/2018       POC Tests: No results for input(s): POCGLU, POCNA, POCK, POCCL, POCBUN, POCHEMO, POCHCT in the last 72 hours. Coags: No results found for: PROTIME, INR, APTT    HCG (If Applicable): No results found for: PREGTESTUR, PREGSERUM, HCG, HCGQUANT     ABGs: No results found for: PHART, PO2ART, QQZ2TVC, LWB1UCX, BEART, G2KYZMIA     Type & Screen (If Applicable):  No results found for: LABABO, [address]    Anesthesia Evaluation  Patient summary reviewed and Nursing notes reviewed  Airway: Mallampati: II  TM distance: >3 FB   Neck ROM: full  Mouth opening: > = 3 FB Dental:      Comment: Multiple missing    Pulmonary:Negative Pulmonary ROS and normal exam                               Cardiovascular:Negative CV ROS                      Neuro/Psych:   Negative Neuro/Psych ROS              GI/Hepatic/Renal: Neg GI/Hepatic/Renal ROS            Endo/Other:    (+) DiabetesType II DM, well controlled, , .                 Abdominal:           Vascular:                                        Anesthesia Plan      MAC     ASA 2       Induction: intravenous.           Plan discussed with surgical team.                  Korin Vital CRNA   3/19/2018

## 2018-03-19 NOTE — PROGRESS NOTES
Bowel sounds are normal.   Musculoskeletal: He exhibits no edema or deformity. Neurological: He is alert and oriented to person, place, and time. Skin: Skin is warm and dry. Psychiatric: He has a normal mood and affect. Judgment normal.     Imaging studies reviewed by me CXR Jan 2018 negative   CT abdomen Nov 2017 base of lung clear   Lab results reviewed in chart  PFT none    ECHO:   Normal left ventricular size and function.   Normal right atrium.   Normal right ventricle structure and function. Stress test 2/2018   Normal myocardial perfusion imaging with no evidence of stress-induced  ischemia nor prior myocardial infarction. 2.  Normal left ventricular ejection fraction. 3.  Suboptimal functional capacity for age. Assessment and Plan      1. BUCKLEY (dyspnea on exertion)  CT CHEST HIGH RESOLUTION    FULL PFT STUDY WITH PRE AND POST   2. Snoring  Sleep Study with PAP Titration   3. Lung nodule  CT CHEST HIGH RESOLUTION   4.  Essential hypertension       · Will R/O obstructive airway disease,or ILD , will obtain PFT and HRCT   · Snoring and daytime sleepiness highly suggest ALLY , will obtain sleep study   · Has a lung nodule on CT abdomen, will repeat CT chest as above  · HTN , on BB, will need workup for ALLY        Orders Placed This Encounter   Procedures    CT CHEST HIGH RESOLUTION     Standing Status:   Future     Standing Expiration Date:   3/19/2019     Order Specific Question:   Reason for exam:     Answer:   BUCKLEY and lung nodule RLL    FULL PFT STUDY WITH PRE AND POST     Standing Status:   Future     Standing Expiration Date:   3/19/2019    Sleep Study with PAP Titration     Standing Status:   Future     Standing Expiration Date:   9/19/2019     Order Specific Question:   Sleep Study Titration Type     Answer:   Split Night Study (Baseline followed by PAP Titration)     Order Specific Question:   Location For Sleep Study     Answer:   [place]     Order Specific Question:   [facility]

## 2018-04-25 DIAGNOSIS — R74.8 ALKALINE PHOSPHATASE ELEVATION: ICD-10-CM

## 2018-04-25 LAB
ALBUMIN SERPL-MCNC: 4.4 G/DL (ref 3.9–4.9)
ALP BLD-CCNC: 123 U/L (ref 35–104)
ALT SERPL-CCNC: 13 U/L (ref 0–41)
AST SERPL-CCNC: 21 U/L (ref 0–40)
BILIRUB SERPL-MCNC: 0.5 MG/DL (ref 0–1.2)
BILIRUBIN DIRECT: <0.2 MG/DL (ref 0–0.3)
BILIRUBIN, INDIRECT: ABNORMAL MG/DL (ref 0–0.6)
GAMMA GLUTAMYL TRANSFERASE: 52 U/L (ref 0–60)
TOTAL PROTEIN: 7.2 G/DL (ref 6.4–8.1)

## 2018-04-26 ENCOUNTER — HOSPITAL ENCOUNTER (OUTPATIENT)
Dept: PULMONOLOGY | Age: 53
Discharge: HOME | End: 2018-04-26
Payer: COMMERCIAL

## 2018-04-26 ENCOUNTER — HOSPITAL ENCOUNTER (OUTPATIENT)
Dept: CT IMAGING | Age: 53
Discharge: HOME | End: 2018-04-28
Payer: COMMERCIAL

## 2018-04-26 DIAGNOSIS — R06.09 DOE (DYSPNEA ON EXERTION): ICD-10-CM

## 2018-04-26 DIAGNOSIS — R91.1 LUNG NODULE: ICD-10-CM

## 2018-04-26 PROCEDURE — 94729 DIFFUSING CAPACITY: CPT

## 2018-04-26 PROCEDURE — 94726 PLETHYSMOGRAPHY LUNG VOLUMES: CPT

## 2018-04-26 PROCEDURE — 94060 EVALUATION OF WHEEZING: CPT | Performed by: INTERNAL MEDICINE

## 2018-04-26 PROCEDURE — 71250 CT THORAX DX C-: CPT

## 2018-04-26 PROCEDURE — 94726 PLETHYSMOGRAPHY LUNG VOLUMES: CPT | Performed by: INTERNAL MEDICINE

## 2018-04-26 PROCEDURE — 6360000002 HC RX W HCPCS: Performed by: INTERNAL MEDICINE

## 2018-04-26 PROCEDURE — 94729 DIFFUSING CAPACITY: CPT | Performed by: INTERNAL MEDICINE

## 2018-04-26 PROCEDURE — 94060 EVALUATION OF WHEEZING: CPT

## 2018-04-26 RX ORDER — ALBUTEROL SULFATE 2.5 MG/3ML
2.5 SOLUTION RESPIRATORY (INHALATION) ONCE
Status: COMPLETED | OUTPATIENT
Start: 2018-04-26 | End: 2018-04-26

## 2018-04-26 RX ADMIN — ALBUTEROL SULFATE 2.5 MG: 2.5 SOLUTION RESPIRATORY (INHALATION) at 10:46

## 2018-04-30 ENCOUNTER — OFFICE VISIT (OUTPATIENT)
Dept: PULMONOLOGY | Age: 53
End: 2018-04-30
Payer: COMMERCIAL

## 2018-04-30 VITALS
BODY MASS INDEX: 27.21 KG/M2 | SYSTOLIC BLOOD PRESSURE: 122 MMHG | OXYGEN SATURATION: 96 % | WEIGHT: 173.4 LBS | TEMPERATURE: 98.6 F | DIASTOLIC BLOOD PRESSURE: 70 MMHG | HEART RATE: 93 BPM | HEIGHT: 67 IN

## 2018-04-30 DIAGNOSIS — K21.9 GASTROESOPHAGEAL REFLUX DISEASE WITHOUT ESOPHAGITIS: ICD-10-CM

## 2018-04-30 DIAGNOSIS — R06.09 DOE (DYSPNEA ON EXERTION): Primary | ICD-10-CM

## 2018-04-30 DIAGNOSIS — I51.89 DIASTOLIC DYSFUNCTION: ICD-10-CM

## 2018-04-30 DIAGNOSIS — R06.83 SNORING: ICD-10-CM

## 2018-04-30 PROCEDURE — 99214 OFFICE O/P EST MOD 30 MIN: CPT | Performed by: INTERNAL MEDICINE

## 2018-04-30 PROCEDURE — G8427 DOCREV CUR MEDS BY ELIG CLIN: HCPCS | Performed by: INTERNAL MEDICINE

## 2018-04-30 PROCEDURE — 3017F COLORECTAL CA SCREEN DOC REV: CPT | Performed by: INTERNAL MEDICINE

## 2018-04-30 PROCEDURE — G8419 CALC BMI OUT NRM PARAM NOF/U: HCPCS | Performed by: INTERNAL MEDICINE

## 2018-04-30 PROCEDURE — 1036F TOBACCO NON-USER: CPT | Performed by: INTERNAL MEDICINE

## 2018-04-30 RX ORDER — FLUTICASONE FUROATE AND VILANTEROL 200; 25 UG/1; UG/1
1 POWDER RESPIRATORY (INHALATION) DAILY
Qty: 1 EACH | Refills: 3 | Status: SHIPPED | OUTPATIENT
Start: 2018-04-30 | End: 2018-11-29 | Stop reason: SDUPTHER

## 2018-04-30 RX ORDER — OMEPRAZOLE 40 MG/1
40 CAPSULE, DELAYED RELEASE ORAL
COMMUNITY
Start: 2018-04-17 | End: 2019-04-01 | Stop reason: SDUPTHER

## 2018-05-02 ENCOUNTER — OFFICE VISIT (OUTPATIENT)
Dept: FAMILY MEDICINE CLINIC | Age: 53
End: 2018-05-02
Payer: COMMERCIAL

## 2018-05-02 VITALS
TEMPERATURE: 97.9 F | SYSTOLIC BLOOD PRESSURE: 100 MMHG | RESPIRATION RATE: 16 BRPM | HEIGHT: 67 IN | HEART RATE: 81 BPM | OXYGEN SATURATION: 99 % | DIASTOLIC BLOOD PRESSURE: 60 MMHG | BODY MASS INDEX: 27.39 KG/M2 | WEIGHT: 174.5 LBS

## 2018-05-02 DIAGNOSIS — E78.1 HYPERTRIGLYCERIDEMIA: ICD-10-CM

## 2018-05-02 DIAGNOSIS — I10 ESSENTIAL HYPERTENSION: Primary | ICD-10-CM

## 2018-05-02 DIAGNOSIS — R74.8 ALKALINE PHOSPHATASE ELEVATION: ICD-10-CM

## 2018-05-02 PROCEDURE — 1036F TOBACCO NON-USER: CPT | Performed by: FAMILY MEDICINE

## 2018-05-02 PROCEDURE — G8419 CALC BMI OUT NRM PARAM NOF/U: HCPCS | Performed by: FAMILY MEDICINE

## 2018-05-02 PROCEDURE — G8427 DOCREV CUR MEDS BY ELIG CLIN: HCPCS | Performed by: FAMILY MEDICINE

## 2018-05-02 PROCEDURE — 99214 OFFICE O/P EST MOD 30 MIN: CPT | Performed by: FAMILY MEDICINE

## 2018-05-02 PROCEDURE — 3017F COLORECTAL CA SCREEN DOC REV: CPT | Performed by: FAMILY MEDICINE

## 2018-05-02 ASSESSMENT — ENCOUNTER SYMPTOMS
SHORTNESS OF BREATH: 1
ABDOMINAL PAIN: 0

## 2018-05-15 ENCOUNTER — HOSPITAL ENCOUNTER (OUTPATIENT)
Dept: SLEEP CENTER | Age: 53
Discharge: HOME | End: 2018-05-17
Payer: COMMERCIAL

## 2018-05-15 PROCEDURE — 95810 POLYSOM 6/> YRS 4/> PARAM: CPT

## 2018-05-27 ENCOUNTER — HOSPITAL ENCOUNTER (OUTPATIENT)
Dept: SLEEP CENTER | Age: 53
Discharge: HOME | End: 2018-05-29
Payer: COMMERCIAL

## 2018-05-27 PROCEDURE — 95811 POLYSOM 6/>YRS CPAP 4/> PARM: CPT

## 2018-06-07 ENCOUNTER — OFFICE VISIT (OUTPATIENT)
Dept: ENDOCRINOLOGY | Age: 53
End: 2018-06-07
Payer: COMMERCIAL

## 2018-06-07 VITALS
DIASTOLIC BLOOD PRESSURE: 75 MMHG | HEIGHT: 67 IN | SYSTOLIC BLOOD PRESSURE: 114 MMHG | BODY MASS INDEX: 27.31 KG/M2 | HEART RATE: 92 BPM | WEIGHT: 174 LBS

## 2018-06-07 LAB
GLUCOSE BLD-MCNC: 176 MG/DL
HBA1C MFR BLD: 6.5 %

## 2018-06-07 PROCEDURE — 2022F DILAT RTA XM EVC RTNOPTHY: CPT | Performed by: INTERNAL MEDICINE

## 2018-06-07 PROCEDURE — 83036 HEMOGLOBIN GLYCOSYLATED A1C: CPT | Performed by: INTERNAL MEDICINE

## 2018-06-07 PROCEDURE — G8419 CALC BMI OUT NRM PARAM NOF/U: HCPCS | Performed by: INTERNAL MEDICINE

## 2018-06-07 PROCEDURE — 82962 GLUCOSE BLOOD TEST: CPT | Performed by: INTERNAL MEDICINE

## 2018-06-07 PROCEDURE — 99213 OFFICE O/P EST LOW 20 MIN: CPT | Performed by: INTERNAL MEDICINE

## 2018-06-07 PROCEDURE — 3017F COLORECTAL CA SCREEN DOC REV: CPT | Performed by: INTERNAL MEDICINE

## 2018-06-07 PROCEDURE — 3044F HG A1C LEVEL LT 7.0%: CPT | Performed by: INTERNAL MEDICINE

## 2018-06-07 PROCEDURE — 1036F TOBACCO NON-USER: CPT | Performed by: INTERNAL MEDICINE

## 2018-06-07 PROCEDURE — G8427 DOCREV CUR MEDS BY ELIG CLIN: HCPCS | Performed by: INTERNAL MEDICINE

## 2018-07-30 ENCOUNTER — OFFICE VISIT (OUTPATIENT)
Dept: PULMONOLOGY | Age: 53
End: 2018-07-30
Payer: COMMERCIAL

## 2018-07-30 VITALS
WEIGHT: 175.8 LBS | TEMPERATURE: 98 F | SYSTOLIC BLOOD PRESSURE: 124 MMHG | HEIGHT: 67 IN | DIASTOLIC BLOOD PRESSURE: 62 MMHG | HEART RATE: 86 BPM | OXYGEN SATURATION: 99 % | BODY MASS INDEX: 27.59 KG/M2

## 2018-07-30 DIAGNOSIS — J45.20 MILD INTERMITTENT ASTHMA WITHOUT COMPLICATION: ICD-10-CM

## 2018-07-30 DIAGNOSIS — J45.20 MILD INTERMITTENT ASTHMA WITHOUT COMPLICATION: Primary | ICD-10-CM

## 2018-07-30 DIAGNOSIS — K21.9 GASTROESOPHAGEAL REFLUX DISEASE WITHOUT ESOPHAGITIS: ICD-10-CM

## 2018-07-30 DIAGNOSIS — G47.33 OSA (OBSTRUCTIVE SLEEP APNEA): ICD-10-CM

## 2018-07-30 DIAGNOSIS — R05.9 COUGH: ICD-10-CM

## 2018-07-30 PROCEDURE — 3017F COLORECTAL CA SCREEN DOC REV: CPT | Performed by: INTERNAL MEDICINE

## 2018-07-30 PROCEDURE — 1036F TOBACCO NON-USER: CPT | Performed by: INTERNAL MEDICINE

## 2018-07-30 PROCEDURE — G8427 DOCREV CUR MEDS BY ELIG CLIN: HCPCS | Performed by: INTERNAL MEDICINE

## 2018-07-30 PROCEDURE — G8419 CALC BMI OUT NRM PARAM NOF/U: HCPCS | Performed by: INTERNAL MEDICINE

## 2018-07-30 PROCEDURE — 99214 OFFICE O/P EST MOD 30 MIN: CPT | Performed by: INTERNAL MEDICINE

## 2018-07-30 RX ORDER — MONTELUKAST SODIUM 10 MG/1
10 TABLET ORAL DAILY
Qty: 30 TABLET | Refills: 3 | Status: SHIPPED | OUTPATIENT
Start: 2018-07-30 | End: 2019-01-24 | Stop reason: SDUPTHER

## 2018-07-30 NOTE — PROGRESS NOTES
narrative on file         Review of Systems      ROS: 10 organs review of system is done including general, psychological, ENT, hematological, endocrine, respiratory, cardiovascular, gastrointestinal, musculoskeletal, neurological,  allergy and Immunology is done and is otherwise negative. Current Outpatient Prescriptions   Medication Sig Dispense Refill    omeprazole (PRILOSEC) 40 MG delayed release capsule Take 40 capsules by mouth      Fluticasone Furoate-Vilanterol (BREO ELLIPTA) 200-25 MCG/INH AEPB Inhale 1 puff into the lungs daily 1 each 3    carvedilol (COREG) 6.25 MG tablet Take 1 tablet by mouth 2 times daily (with meals) 60 tablet 5    insulin NPH (HUMULIN N) 100 UNIT/ML injection vial 30 units at bedtime 2 vial 3    insulin regular (HUMULIN R) 100 UNIT/ML injection Inject 8 units tid with meals 2 vial 3    glucose blood VI test strips (FREESTYLE LITE) strip Test 3x daily DX: E11.65, IDDM 100 each 6    Insulin Syringe-Needle U-100 31G X 5/16\" 1 ML MISC 1 each by Does not apply route 4 times daily 150 each 3    FREESTYLE LANCETS MISC 1 each by Does not apply route 3 times daily DX: E11.65, IDDM 100 each 6    Insulin Syringe-Needle U-100 31G X 5/16\" 1 ML MISC 1 each by Does not apply route daily for 14 days 100 each 0     No current facility-administered medications for this visit. Objective:     Vitals:    07/30/18 1553   BP: 124/62   Site: Left Arm   Pulse: 86   Temp: 98 °F (36.7 °C)   TempSrc: Tympanic   SpO2: 99%   Weight: 175 lb 12.8 oz (79.7 kg)   Height: 5' 7\" (1.702 m)         Physical Exam   Constitutional: He is oriented to person, place, and time. He appears well-developed and well-nourished. HENT:   Head: Normocephalic and atraumatic. Eyes: Conjunctivae are normal. Pupils are equal, round, and reactive to light. Left eye exhibits no discharge. Neck: Normal range of motion. Neck supple. No JVD present. Cardiovascular: Normal rate, regular rhythm and normal heart sounds. Exam reveals no gallop and no friction rub. No murmur heard. Pulmonary/Chest: Effort normal. No respiratory distress. He has wheezes. He has no rales. He exhibits no tenderness. Abdominal: Soft. Bowel sounds are normal.   Musculoskeletal: He exhibits no edema or deformity. Neurological: He is alert and oriented to person, place, and time. Skin: Skin is warm and dry. Psychiatric: He has a normal mood and affect. Judgment normal.     Imaging studies reviewed by CT chest shows me small areas of atelectasis, no nodules or mass   Lab results reviewed in chart  PFT FEV1 65%       Assessment and Plan       Diagnosis Orders   1. Mild intermittent asthma without complication  Allergen, Region 5 Respiratory Panel    beclomethasone (QVAR) 40 MCG/ACT inhaler    montelukast (SINGULAIR) 10 MG tablet   2. Cough  beclomethasone (QVAR) 40 MCG/ACT inhaler    montelukast (SINGULAIR) 10 MG tablet   3. ALLY (obstructive sleep apnea)  DME Order for CPAP as OP   4. Gastroesophageal reflux disease without esophagitis       · Asthma, continue to have cough and some dyspnea on exertion, not well controlled, will obtain ALLERGY testing and will add Qvar and Singulair. · Obstructive sleep apnea will initiate CPAP treatment  · Cont PPI       Orders Placed This Encounter   Procedures    Allergen, Region 5 Respiratory Panel     Standing Status:   Future     Number of Occurrences:   1     Standing Expiration Date:   7/30/2019    DME Order for CPAP as OP     CPAP 7 cm H2O    Heated Humidifier    Nasal Mask 1 per 3 months and Cushions/Seals 2 per month    Headgear 1 per 6 months, Chin Strap 1 per 6 months, Disposable Filters 2 per month, Non-disposable Filters 1 per 6 months, Tubing 1 per 3 months, Integrated Heating Element 1 per 3 months, Chambers 1 per 6 months and Other Related Supplies. Replace supplies and accessories as needed. Patient may choose another interface for compliance and comfort.   Comments: [employer]

## 2018-08-02 LAB
ALLERGEN ASPERGILLUS ALTERNATA IGE: <0.1 KU/L
ALLERGEN ASPERGILLUS FUMIGATUS IGE: <0.1 KU/L
ALLERGEN BERMUDA GRASS IGE: 0.42 KU/L
ALLERGEN BIRCH IGE: <0.1 KU/L
ALLERGEN CAT DANDER IGE: 0.69 KU/L
ALLERGEN COMMON SHORT RAGWEED IGE: 0.72 KU/L
ALLERGEN COTTONWOOD: <0.1 KU/L
ALLERGEN COW MILK IGE: <0.1 KU/L
ALLERGEN DOG DANDER IGE: 1.01 KU/L
ALLERGEN ELM IGE: <0.1 KU/L
ALLERGEN FUNGI/MOLD M.RACEMOSUS IGE: <0.1 KU/L
ALLERGEN GERMAN COCKROACH IGE: <0.1 KU/L
ALLERGEN HORMODENDRUM HORDEI IGE: <0.1 KU/L
ALLERGEN MAPLE/BOX ELDER IGE: <0.1 KU/L
ALLERGEN MITE DUST FARINAE IGE: 2.78 KU/L
ALLERGEN MITE DUST PTERONYSSINUS IGE: 1.54 KU/L
ALLERGEN MOUNTAIN CEDAR: <0.1 KU/L
ALLERGEN MOUSE EPITHELIA IGE: <0.1 KU/L
ALLERGEN OAK TREE IGE: <0.1 KU/L
ALLERGEN PEANUT (F13) IGE: <0.1 KU/L
ALLERGEN PECAN TREE IGE: <0.1 KU/L
ALLERGEN PENICILLIUM NOTATUM: <0.1 KU/L
ALLERGEN ROUGH PIGWEED (W14) IGE: <0.1 KU/L
ALLERGEN RUSSIAN THISTLE IGE: <0.1 KU/L
ALLERGEN SEE NOTE: NORMAL
ALLERGEN SHEEP SORREL (W18) IGE: <0.1 KU/L
ALLERGEN TIMOTHY GRASS: 1.26 KU/L
ALLERGEN TREE SYCAMORE: <0.1 KU/L
ALLERGEN WALNUT TREE IGE: <0.1 KU/L
ALLERGEN WHITE MULBERRY TREE, IGE: <0.1 KU/L
ALLERGEN, TREE, WHITE ASH IGE: <0.1 KU/L
IGE: 137 KU/L

## 2018-09-07 ENCOUNTER — OFFICE VISIT (OUTPATIENT)
Dept: ENDOCRINOLOGY | Age: 53
End: 2018-09-07
Payer: COMMERCIAL

## 2018-09-07 VITALS
WEIGHT: 177 LBS | DIASTOLIC BLOOD PRESSURE: 86 MMHG | HEART RATE: 80 BPM | BODY MASS INDEX: 27.78 KG/M2 | SYSTOLIC BLOOD PRESSURE: 144 MMHG | HEIGHT: 67 IN

## 2018-09-07 DIAGNOSIS — E11.3299 NON-PROLIFERATIVE DIABETIC RETINOPATHY (HCC): ICD-10-CM

## 2018-09-07 DIAGNOSIS — N18.30 STAGE 3 CHRONIC KIDNEY DISEASE (HCC): ICD-10-CM

## 2018-09-07 DIAGNOSIS — E13.311 MACULAR EDEMA DUE TO SECONDARY DIABETES (HCC): ICD-10-CM

## 2018-09-07 PROBLEM — E11.3493 SEVERE NONPROLIFERATIVE DIABETIC RETINOPATHY OF BOTH EYES WITHOUT MACULAR EDEMA ASSOCIATED WITH TYPE 2 DIABETES MELLITUS (HCC): Status: ACTIVE | Noted: 2018-09-07

## 2018-09-07 PROBLEM — E11.3493 SEVERE NONPROLIFERATIVE DIABETIC RETINOPATHY OF BOTH EYES WITHOUT MACULAR EDEMA ASSOCIATED WITH TYPE 2 DIABETES MELLITUS (HCC): Status: RESOLVED | Noted: 2018-09-07 | Resolved: 2018-09-07

## 2018-09-07 LAB
ANION GAP SERPL CALCULATED.3IONS-SCNC: 12 MEQ/L (ref 7–13)
BUN BLDV-MCNC: 14 MG/DL (ref 6–20)
CALCIUM SERPL-MCNC: 9.4 MG/DL (ref 8.6–10.2)
CHLORIDE BLD-SCNC: 100 MEQ/L (ref 98–107)
CO2: 26 MEQ/L (ref 22–29)
CREAT SERPL-MCNC: 1.1 MG/DL (ref 0.7–1.2)
GFR AFRICAN AMERICAN: >60
GFR NON-AFRICAN AMERICAN: >60
GLUCOSE BLD-MCNC: 197 MG/DL (ref 74–109)
GLUCOSE BLD-MCNC: 211 MG/DL
HBA1C MFR BLD: 7.1 %
POTASSIUM SERPL-SCNC: 5.4 MEQ/L (ref 3.5–5.1)
SODIUM BLD-SCNC: 138 MEQ/L (ref 132–144)

## 2018-09-07 PROCEDURE — 99213 OFFICE O/P EST LOW 20 MIN: CPT | Performed by: INTERNAL MEDICINE

## 2018-09-07 PROCEDURE — 83036 HEMOGLOBIN GLYCOSYLATED A1C: CPT | Performed by: INTERNAL MEDICINE

## 2018-09-07 PROCEDURE — G8419 CALC BMI OUT NRM PARAM NOF/U: HCPCS | Performed by: INTERNAL MEDICINE

## 2018-09-07 PROCEDURE — 2022F DILAT RTA XM EVC RTNOPTHY: CPT | Performed by: INTERNAL MEDICINE

## 2018-09-07 PROCEDURE — 3017F COLORECTAL CA SCREEN DOC REV: CPT | Performed by: INTERNAL MEDICINE

## 2018-09-07 PROCEDURE — 3045F PR MOST RECENT HEMOGLOBIN A1C LEVEL 7.0-9.0%: CPT | Performed by: INTERNAL MEDICINE

## 2018-09-07 PROCEDURE — 82962 GLUCOSE BLOOD TEST: CPT | Performed by: INTERNAL MEDICINE

## 2018-09-07 PROCEDURE — G8427 DOCREV CUR MEDS BY ELIG CLIN: HCPCS | Performed by: INTERNAL MEDICINE

## 2018-09-07 PROCEDURE — 1036F TOBACCO NON-USER: CPT | Performed by: INTERNAL MEDICINE

## 2018-09-07 ASSESSMENT — ENCOUNTER SYMPTOMS
VISUAL CHANGE: 1
WHEEZING: 1

## 2018-09-07 NOTE — PROGRESS NOTES
normal.   Musculoskeletal: Normal range of motion. He exhibits no edema. Feet:    Neurological: He is alert and oriented to person, place, and time. Skin: Skin is dry. Psychiatric: He has a normal mood and affect. Assessment:       Diagnosis Orders   1. Uncontrolled type 2 diabetes mellitus with complication, with long-term current use of insulin ([facility])  POCT Glucose    POCT glycosylated hemoglobin (Hb A1C)     DIABETES FOOT EXAM    Basic Metabolic Panel   2.  Stage 3 chronic kidney disease             Plan:      Orders Placed This Encounter   Procedures    Basic Metabolic Panel     Standing Status:   Future     Standing Expiration Date:   9/7/2019    POCT Glucose    POCT glycosylated hemoglobin (Hb A1C)     DIABETES FOOT EXAM     Continue Humulin N 30 units at bedtime plus Humulin R 8 units at each meals  A1c target of 6.5 or lower  Patient to have labs done today        Toro Guerrero MD

## 2018-09-12 ENCOUNTER — OFFICE VISIT (OUTPATIENT)
Dept: FAMILY MEDICINE CLINIC | Age: 53
End: 2018-09-12
Payer: COMMERCIAL

## 2018-09-12 VITALS
OXYGEN SATURATION: 98 % | TEMPERATURE: 97.8 F | DIASTOLIC BLOOD PRESSURE: 74 MMHG | BODY MASS INDEX: 27.47 KG/M2 | HEART RATE: 88 BPM | WEIGHT: 175 LBS | SYSTOLIC BLOOD PRESSURE: 124 MMHG | HEIGHT: 67 IN | RESPIRATION RATE: 16 BRPM

## 2018-09-12 DIAGNOSIS — L25.5 RHUS DERMATITIS: Primary | ICD-10-CM

## 2018-09-12 DIAGNOSIS — E11.9 TYPE 2 DIABETES MELLITUS WITHOUT COMPLICATION, WITH LONG-TERM CURRENT USE OF INSULIN (HCC): ICD-10-CM

## 2018-09-12 DIAGNOSIS — L03.90 CELLULITIS, UNSPECIFIED CELLULITIS SITE: ICD-10-CM

## 2018-09-12 DIAGNOSIS — Z79.4 TYPE 2 DIABETES MELLITUS WITHOUT COMPLICATION, WITH LONG-TERM CURRENT USE OF INSULIN (HCC): ICD-10-CM

## 2018-09-12 PROCEDURE — 3017F COLORECTAL CA SCREEN DOC REV: CPT | Performed by: INTERNAL MEDICINE

## 2018-09-12 PROCEDURE — 1036F TOBACCO NON-USER: CPT | Performed by: INTERNAL MEDICINE

## 2018-09-12 PROCEDURE — 3045F PR MOST RECENT HEMOGLOBIN A1C LEVEL 7.0-9.0%: CPT | Performed by: INTERNAL MEDICINE

## 2018-09-12 PROCEDURE — 99214 OFFICE O/P EST MOD 30 MIN: CPT | Performed by: INTERNAL MEDICINE

## 2018-09-12 PROCEDURE — 2022F DILAT RTA XM EVC RTNOPTHY: CPT | Performed by: INTERNAL MEDICINE

## 2018-09-12 PROCEDURE — G8427 DOCREV CUR MEDS BY ELIG CLIN: HCPCS | Performed by: INTERNAL MEDICINE

## 2018-09-12 PROCEDURE — G8419 CALC BMI OUT NRM PARAM NOF/U: HCPCS | Performed by: INTERNAL MEDICINE

## 2018-09-12 RX ORDER — AMOXICILLIN AND CLAVULANATE POTASSIUM 875; 125 MG/1; MG/1
1 TABLET, FILM COATED ORAL 2 TIMES DAILY
Qty: 14 TABLET | Refills: 0 | Status: SHIPPED | OUTPATIENT
Start: 2018-09-12 | End: 2018-09-19

## 2018-09-12 ASSESSMENT — ENCOUNTER SYMPTOMS
BACK PAIN: 0
ABDOMINAL PAIN: 0
EYE PAIN: 0
SHORTNESS OF BREATH: 0

## 2018-09-12 ASSESSMENT — VISUAL ACUITY
OS_CC: 20/25
OD_CC: 20/30

## 2018-09-12 NOTE — PROGRESS NOTES
Subjective:      Patient ID: Patricia Peralta is a 47 y.o. male who presents today with:  Chief Complaint   Patient presents with    Swelling     eyes x6days, not sure if allergies or not       HPI    No eye pain. Rash located on left lower extremity, under right eye, right leg, and left antecubital fossa (but likely a bruise). He was outside cutting the grass a few days before this started. Right eye swelling-No pain, no vision changes, he thinks he touched his eye due to sweating around the time of this lesion. Diabetes-Chronic issue, compliant with humulin r 8 units tid with meals and humulin n 30 units hs. Known hypertension. Compliant with therapy. Past Medical History:   Diagnosis Date    Acid reflux disease with ulcer 03/19/2018    Diabetes mellitus ([address]     BUCKLEY (dyspnea on exertion) 2/2/2018    Hypertension     Mixed hyperlipidemia 2/2/2018     Past Surgical History:   Procedure Laterality Date    CYSTOSCOPY Right 12/13/2017    CYSTOSCOPY RIGHT  RETROGRADE PYELOGRAM AND REMOVAL OF RIGHT DJ STENT (LABS DONE 11/27) performed by Lit Xiao MD at [address] [place] [address] / [facility] / Chente Abler Right 11/8/2017    CYSTOSCOPY RIGHT DOUBLE J  STENT INSERTION performed by Lit Xiao MD at [facility] [place] FLX DX W/JACY [address] 1977 PFRMD N/A 3/19/2018    COLONOSCOPY performed by Jocelyn Andino MD at [facility] ESOPHAGOGASTRODUODENOSCOPY TRANSORAL DIAGNOSTIC N/A 3/19/2018    EGD ESOPHAGOGASTRODUODENOSCOPY performed by Jocelyn Andino MD at [facility] Marital status:      Spouse name: N/A    Number of children: N/A    Years of education: N/A     Occupational History    Not on file.      Social History Main Topics    Smoking status: Never Smoker    Smokeless tobacco: Never Used    Alcohol use Yes      Comment: occasional    Drug use: No    Sexual activity: Not on Negative for hallucinations. Objective:   /74 (Site: Right Upper Arm, Position: Sitting, Cuff Size: Large Adult)   Pulse 88   Temp 97.8 °F (36.6 °C) (Tympanic)   Resp 16   Ht 5' 7\" (1.702 m)   Wt 175 lb (79.4 kg)   SpO2 98%   BMI 27.41 kg/m²     Physical Exam   Constitutional: He is oriented to person, place, and time. He appears well-developed and well-nourished. HENT:   Head: Normocephalic. Eyes: Pupils are equal, round, and reactive to light. Neck: No tracheal deviation present. Cardiovascular: Normal rate, regular rhythm and normal heart sounds. Exam reveals no gallop and no friction rub. No murmur heard. Pulmonary/Chest: No respiratory distress. Abdominal: Soft. Bowel sounds are normal. He exhibits no distension. There is no rebound. Musculoskeletal: He exhibits no edema. Neurological: He is oriented to person, place, and time. Skin: Skin is warm and dry. Rash (Macular rash over left lower extremity, right lower extremity, inferior to right eye. ) noted. Assessment:       Diagnosis Orders   1. Rhus dermatitis  triamcinolone (KENALOG) 0.1 % ointment   2. Cellulitis, unspecified cellulitis site  amoxicillin-clavulanate (AUGMENTIN) 875-125 MG per tablet   3. Type 2 diabetes mellitus without complication, with long-term current use of insulin ([facility])           Plan:    Most likely all rhus derm but given some swelling inferior to right orbit will start augmentin  If anything worsens in or around Eye go to ER immediately, risk of vision changes and worsening infection explained in detail, close follow up this Friday either way. Start topical triamcinolone on lesions every but face and groin. If anything worsens with regards to right eye call asap or go to ER  Continue same plan of care. No changes. The patient was reminded that an antibiotic has been prescribed the predicted course is improvement to cure with no persistent issues. Take antibiotics as directed.

## 2018-09-14 ENCOUNTER — OFFICE VISIT (OUTPATIENT)
Dept: FAMILY MEDICINE CLINIC | Age: 53
End: 2018-09-14
Payer: COMMERCIAL

## 2018-09-14 VITALS
WEIGHT: 177 LBS | SYSTOLIC BLOOD PRESSURE: 124 MMHG | DIASTOLIC BLOOD PRESSURE: 74 MMHG | TEMPERATURE: 97.8 F | BODY MASS INDEX: 27.78 KG/M2 | OXYGEN SATURATION: 98 % | HEART RATE: 72 BPM | RESPIRATION RATE: 16 BRPM | HEIGHT: 67 IN

## 2018-09-14 DIAGNOSIS — L03.213 PRESEPTAL CELLULITIS OF RIGHT EYE: Primary | ICD-10-CM

## 2018-09-14 DIAGNOSIS — L25.5 RHUS DERMATITIS: ICD-10-CM

## 2018-09-14 PROCEDURE — 3017F COLORECTAL CA SCREEN DOC REV: CPT | Performed by: INTERNAL MEDICINE

## 2018-09-14 PROCEDURE — 99213 OFFICE O/P EST LOW 20 MIN: CPT | Performed by: INTERNAL MEDICINE

## 2018-09-14 PROCEDURE — G8419 CALC BMI OUT NRM PARAM NOF/U: HCPCS | Performed by: INTERNAL MEDICINE

## 2018-09-14 PROCEDURE — G8427 DOCREV CUR MEDS BY ELIG CLIN: HCPCS | Performed by: INTERNAL MEDICINE

## 2018-09-14 PROCEDURE — 1036F TOBACCO NON-USER: CPT | Performed by: INTERNAL MEDICINE

## 2018-09-14 PROCEDURE — 96372 THER/PROPH/DIAG INJ SC/IM: CPT | Performed by: INTERNAL MEDICINE

## 2018-09-14 RX ORDER — METHYLPREDNISOLONE ACETATE 40 MG/ML
40 INJECTION, SUSPENSION INTRA-ARTICULAR; INTRALESIONAL; INTRAMUSCULAR; SOFT TISSUE ONCE
Status: COMPLETED | OUTPATIENT
Start: 2018-09-14 | End: 2018-09-14

## 2018-09-14 RX ADMIN — METHYLPREDNISOLONE ACETATE 40 MG: 40 INJECTION, SUSPENSION INTRA-ARTICULAR; INTRALESIONAL; INTRAMUSCULAR; SOFT TISSUE at 11:05

## 2018-09-14 ASSESSMENT — ENCOUNTER SYMPTOMS
ABDOMINAL PAIN: 0
BACK PAIN: 0
SHORTNESS OF BREATH: 0
EYE PAIN: 0

## 2018-09-14 NOTE — PROGRESS NOTES
methylSubjective:      Patient ID: Bk Stewart is a 47 y.o. male who presents today with:  Chief Complaint   Patient presents with    Dermatitis     dermatitis on limbs 2day follow up       HPI     Rash-Multiple locations on bl upper extremities (distal>proximal) and bl lower extremities (distal>proximal) no lesions on palms or soles. Improved since last visit. Applying topical triamcinolone. Eye swelling-Acute, location right eye. Improved since last visit on 9/12/18. He has been taking Augmentin. He mentioned he scratched his eye after likely contacting posion ivy else    Past Medical History:   Diagnosis Date    Acid reflux disease with ulcer 03/19/2018    Diabetes mellitus ([address]     BUCKLEY (dyspnea on exertion) 2/2/2018    Hypertension     Mixed hyperlipidemia 2/2/2018     Past Surgical History:   Procedure Laterality Date    CYSTOSCOPY Right 12/13/2017    CYSTOSCOPY RIGHT  RETROGRADE PYELOGRAM AND REMOVAL OF RIGHT DJ STENT (LABS DONE 11/27) performed by Magdiel Hunt MD at [address] [place] [address] / [facility] Generous / STONE Right 11/8/2017    CYSTOSCOPY RIGHT DOUBLE J  STENT INSERTION performed by Magdiel Hunt MD at [address] [place] [address] [place] FLX DX W/COLLJ [address] 1977 PFRMD N/A 3/19/2018    COLONOSCOPY performed by Rubén Sahu MD at [facility] ESOPHAGOGASTRODUODENOSCOPY TRANSORAL DIAGNOSTIC N/A 3/19/2018    EGD ESOPHAGOGASTRODUODENOSCOPY performed by Rubén Sahu MD at [place] [address] Marital status:      Spouse name: N/A    Number of children: N/A    Years of education: N/A     Occupational History    Not on file.      Social History Main Topics    Smoking status: Never Smoker    Smokeless tobacco: Never Used    Alcohol use Yes      Comment: occasional    Drug use: No    Sexual activity: Not on file     Other Topics Concern    Not on file     Social History Narrative    No few days for bx. No orders of the defined types were placed in this encounter. Orders Placed This Encounter   Medications    methylPREDNISolone acetate (DEPO-MEDROL) injection 40 mg       Return for regularly scheduled appointment with PCP, worsening symptoms, call ASAP for appointment.       Carly Dunham MD

## 2018-10-31 ENCOUNTER — OFFICE VISIT (OUTPATIENT)
Dept: PULMONOLOGY | Age: 53
End: 2018-10-31
Payer: COMMERCIAL

## 2018-10-31 VITALS
DIASTOLIC BLOOD PRESSURE: 82 MMHG | TEMPERATURE: 98.2 F | SYSTOLIC BLOOD PRESSURE: 130 MMHG | HEIGHT: 67 IN | OXYGEN SATURATION: 99 % | HEART RATE: 86 BPM | WEIGHT: 180.8 LBS | BODY MASS INDEX: 28.38 KG/M2

## 2018-10-31 DIAGNOSIS — G47.33 OSA (OBSTRUCTIVE SLEEP APNEA): ICD-10-CM

## 2018-10-31 DIAGNOSIS — K21.9 GASTROESOPHAGEAL REFLUX DISEASE WITHOUT ESOPHAGITIS: ICD-10-CM

## 2018-10-31 DIAGNOSIS — J45.20 MILD INTERMITTENT ASTHMA WITHOUT COMPLICATION: Primary | ICD-10-CM

## 2018-10-31 DIAGNOSIS — I51.89 DIASTOLIC DYSFUNCTION: ICD-10-CM

## 2018-10-31 PROCEDURE — 99213 OFFICE O/P EST LOW 20 MIN: CPT | Performed by: INTERNAL MEDICINE

## 2018-10-31 PROCEDURE — G8484 FLU IMMUNIZE NO ADMIN: HCPCS | Performed by: INTERNAL MEDICINE

## 2018-10-31 PROCEDURE — 1036F TOBACCO NON-USER: CPT | Performed by: INTERNAL MEDICINE

## 2018-10-31 PROCEDURE — G8419 CALC BMI OUT NRM PARAM NOF/U: HCPCS | Performed by: INTERNAL MEDICINE

## 2018-10-31 PROCEDURE — G8427 DOCREV CUR MEDS BY ELIG CLIN: HCPCS | Performed by: INTERNAL MEDICINE

## 2018-10-31 PROCEDURE — 3017F COLORECTAL CA SCREEN DOC REV: CPT | Performed by: INTERNAL MEDICINE

## 2018-10-31 RX ORDER — ALBUTEROL SULFATE 90 UG/1
2 AEROSOL, METERED RESPIRATORY (INHALATION) EVERY 6 HOURS PRN
Qty: 1 INHALER | Refills: 3 | Status: SHIPPED | OUTPATIENT
Start: 2018-10-31 | End: 2022-09-16

## 2018-11-02 DIAGNOSIS — R74.8 ALKALINE PHOSPHATASE ELEVATION: ICD-10-CM

## 2018-11-02 LAB — ALP BLD-CCNC: 138 U/L (ref 35–104)

## 2018-11-09 ENCOUNTER — OFFICE VISIT (OUTPATIENT)
Dept: FAMILY MEDICINE CLINIC | Age: 53
End: 2018-11-09
Payer: COMMERCIAL

## 2018-11-09 VITALS
HEIGHT: 67 IN | WEIGHT: 179.6 LBS | DIASTOLIC BLOOD PRESSURE: 88 MMHG | SYSTOLIC BLOOD PRESSURE: 126 MMHG | TEMPERATURE: 97.4 F | OXYGEN SATURATION: 100 % | HEART RATE: 102 BPM | RESPIRATION RATE: 12 BRPM | BODY MASS INDEX: 28.19 KG/M2

## 2018-11-09 DIAGNOSIS — R74.8 ALKALINE PHOSPHATASE ELEVATION: ICD-10-CM

## 2018-11-09 DIAGNOSIS — I10 ESSENTIAL HYPERTENSION: Primary | ICD-10-CM

## 2018-11-09 PROCEDURE — 1036F TOBACCO NON-USER: CPT | Performed by: FAMILY MEDICINE

## 2018-11-09 PROCEDURE — 99213 OFFICE O/P EST LOW 20 MIN: CPT | Performed by: FAMILY MEDICINE

## 2018-11-09 PROCEDURE — G8419 CALC BMI OUT NRM PARAM NOF/U: HCPCS | Performed by: FAMILY MEDICINE

## 2018-11-09 PROCEDURE — G8484 FLU IMMUNIZE NO ADMIN: HCPCS | Performed by: FAMILY MEDICINE

## 2018-11-09 PROCEDURE — 3017F COLORECTAL CA SCREEN DOC REV: CPT | Performed by: FAMILY MEDICINE

## 2018-11-09 PROCEDURE — G8427 DOCREV CUR MEDS BY ELIG CLIN: HCPCS | Performed by: FAMILY MEDICINE

## 2018-11-09 ASSESSMENT — PATIENT HEALTH QUESTIONNAIRE - PHQ9
1. LITTLE INTEREST OR PLEASURE IN DOING THINGS: 0
SUM OF ALL RESPONSES TO PHQ QUESTIONS 1-9: 0
2. FEELING DOWN, DEPRESSED OR HOPELESS: 0
SUM OF ALL RESPONSES TO PHQ QUESTIONS 1-9: 0
SUM OF ALL RESPONSES TO PHQ9 QUESTIONS 1 & 2: 0

## 2018-11-09 ASSESSMENT — ENCOUNTER SYMPTOMS
SHORTNESS OF BREATH: 0
BACK PAIN: 1

## 2018-11-09 NOTE — PROGRESS NOTES
Subjective:      Patient ID: Griffin Xie is a 47 y.o. male. HPIhere in follow up for htn and elevated alk phos  Treatment Adherence:   Medication compliance:  compliant most of the time  Diet compliance:  compliant most of the time  Weight trend: increasing  Current exercise: no regular exercise  Barriers: none    Hypertension:  Home blood pressure monitoring: Yes - . He is adherent to a low sodium diet. Patient denies chest pain. Antihypertensive medication side effects: no medication side effects noted. Use of agents associated with hypertension: none. Sodium (mEq/L)   Date Value   09/07/2018 138    BUN (mg/dL)   Date Value   09/07/2018 14    Glucose (mg/dL)   Date Value   09/07/2018 197 (H)      Potassium (mEq/L)   Date Value   09/07/2018 5.4 (H)    CREATININE (mg/dL)   Date Value   09/07/2018 1.10             Lab Results   Component Value Date    CHOL 190 01/26/2018    TRIG 317 (H) 01/26/2018    HDL 34 (L) 01/26/2018    LDLCALC 93 01/26/2018     Lab Results   Component Value Date    ALT 13 04/25/2018    AST 21 04/25/2018          Review of Systems   Constitutional: Negative for activity change, appetite change and unexpected weight change. Respiratory: Negative for shortness of breath. Cardiovascular: Negative for chest pain. Musculoskeletal: Positive for back pain. Skin: Negative for rash. Neurological: Negative for dizziness and weakness.      Reviewed allergy, medical, social, surgical, family and med list changes and updated   Files--reviewed blood work with persistent alk phos elevation   Social History     Social History    Marital status:      Spouse name: N/A    Number of children: N/A    Years of education: N/A     Social History Main Topics    Smoking status: Never Smoker    Smokeless tobacco: Never Used    Alcohol use Yes      Comment: occasional    Drug use: No    Sexual activity: Not Asked     Other Topics Concern    None

## 2018-11-21 ENCOUNTER — HOSPITAL ENCOUNTER (OUTPATIENT)
Dept: NUCLEAR MEDICINE | Age: 53
Discharge: HOME | End: 2018-11-23
Payer: COMMERCIAL

## 2018-11-21 DIAGNOSIS — R74.8 ALKALINE PHOSPHATASE ELEVATION: ICD-10-CM

## 2018-11-21 PROCEDURE — 78306 BONE IMAGING WHOLE BODY: CPT

## 2018-11-21 PROCEDURE — A9503 TC99M MEDRONATE: HCPCS | Performed by: FAMILY MEDICINE

## 2018-11-21 PROCEDURE — 3430000000 HC RX DIAGNOSTIC RADIOPHARMACEUTICAL: Performed by: FAMILY MEDICINE

## 2018-11-21 RX ORDER — TC 99M MEDRONATE 20 MG/10ML
25 INJECTION, POWDER, LYOPHILIZED, FOR SOLUTION INTRAVENOUS
Status: COMPLETED | OUTPATIENT
Start: 2018-11-21 | End: 2018-11-21

## 2018-11-21 RX ADMIN — Medication 29.9 MILLICURIE: at 08:06

## 2018-11-29 DIAGNOSIS — R06.09 DOE (DYSPNEA ON EXERTION): ICD-10-CM

## 2018-12-07 ENCOUNTER — OFFICE VISIT (OUTPATIENT)
Dept: ENDOCRINOLOGY | Age: 53
End: 2018-12-07
Payer: COMMERCIAL

## 2018-12-07 VITALS
HEIGHT: 67 IN | WEIGHT: 181 LBS | HEART RATE: 97 BPM | DIASTOLIC BLOOD PRESSURE: 83 MMHG | BODY MASS INDEX: 28.41 KG/M2 | OXYGEN SATURATION: 96 % | SYSTOLIC BLOOD PRESSURE: 126 MMHG

## 2018-12-07 LAB
GLUCOSE BLD-MCNC: 210 MG/DL
HBA1C MFR BLD: 9.5 %

## 2018-12-07 PROCEDURE — 99213 OFFICE O/P EST LOW 20 MIN: CPT | Performed by: INTERNAL MEDICINE

## 2018-12-07 PROCEDURE — 3017F COLORECTAL CA SCREEN DOC REV: CPT | Performed by: INTERNAL MEDICINE

## 2018-12-07 PROCEDURE — G8419 CALC BMI OUT NRM PARAM NOF/U: HCPCS | Performed by: INTERNAL MEDICINE

## 2018-12-07 PROCEDURE — G8427 DOCREV CUR MEDS BY ELIG CLIN: HCPCS | Performed by: INTERNAL MEDICINE

## 2018-12-07 PROCEDURE — 82962 GLUCOSE BLOOD TEST: CPT | Performed by: INTERNAL MEDICINE

## 2018-12-07 PROCEDURE — 95250 CONT GLUC MNTR PHYS/QHP EQP: CPT | Performed by: INTERNAL MEDICINE

## 2018-12-07 PROCEDURE — 83036 HEMOGLOBIN GLYCOSYLATED A1C: CPT | Performed by: INTERNAL MEDICINE

## 2018-12-07 PROCEDURE — 3046F HEMOGLOBIN A1C LEVEL >9.0%: CPT | Performed by: INTERNAL MEDICINE

## 2018-12-07 PROCEDURE — G8484 FLU IMMUNIZE NO ADMIN: HCPCS | Performed by: INTERNAL MEDICINE

## 2018-12-07 PROCEDURE — 2022F DILAT RTA XM EVC RTNOPTHY: CPT | Performed by: INTERNAL MEDICINE

## 2018-12-07 PROCEDURE — 1036F TOBACCO NON-USER: CPT | Performed by: INTERNAL MEDICINE

## 2018-12-07 RX ORDER — FLASH GLUCOSE SENSOR
KIT MISCELLANEOUS
Qty: 2 EACH | Refills: 3 | Status: SHIPPED | OUTPATIENT
Start: 2018-12-07 | End: 2019-06-26

## 2018-12-07 RX ORDER — FLASH GLUCOSE SCANNING READER
EACH MISCELLANEOUS
Qty: 1 DEVICE | Refills: 0 | Status: SHIPPED | OUTPATIENT
Start: 2018-12-07 | End: 2019-06-26

## 2018-12-16 ASSESSMENT — ENCOUNTER SYMPTOMS
WHEEZING: 1
VISUAL CHANGE: 1

## 2018-12-21 ENCOUNTER — NURSE ONLY (OUTPATIENT)
Dept: ENDOCRINOLOGY | Age: 53
End: 2018-12-21
Payer: COMMERCIAL

## 2018-12-21 PROCEDURE — 95251 CONT GLUC MNTR ANALYSIS I&R: CPT | Performed by: INTERNAL MEDICINE

## 2019-01-14 ENCOUNTER — OFFICE VISIT (OUTPATIENT)
Dept: ENDOCRINOLOGY | Age: 54
End: 2019-01-14
Payer: COMMERCIAL

## 2019-01-14 VITALS
DIASTOLIC BLOOD PRESSURE: 70 MMHG | WEIGHT: 182 LBS | BODY MASS INDEX: 28.56 KG/M2 | HEIGHT: 67 IN | HEART RATE: 95 BPM | SYSTOLIC BLOOD PRESSURE: 118 MMHG

## 2019-01-14 LAB — GLUCOSE BLD-MCNC: 346 MG/DL

## 2019-01-14 PROCEDURE — G8427 DOCREV CUR MEDS BY ELIG CLIN: HCPCS | Performed by: PHYSICIAN ASSISTANT

## 2019-01-14 PROCEDURE — 82962 GLUCOSE BLOOD TEST: CPT | Performed by: PHYSICIAN ASSISTANT

## 2019-01-14 PROCEDURE — 2022F DILAT RTA XM EVC RTNOPTHY: CPT | Performed by: PHYSICIAN ASSISTANT

## 2019-01-14 PROCEDURE — 3017F COLORECTAL CA SCREEN DOC REV: CPT | Performed by: PHYSICIAN ASSISTANT

## 2019-01-14 PROCEDURE — G8419 CALC BMI OUT NRM PARAM NOF/U: HCPCS | Performed by: PHYSICIAN ASSISTANT

## 2019-01-14 PROCEDURE — 99214 OFFICE O/P EST MOD 30 MIN: CPT | Performed by: PHYSICIAN ASSISTANT

## 2019-01-14 PROCEDURE — 3046F HEMOGLOBIN A1C LEVEL >9.0%: CPT | Performed by: PHYSICIAN ASSISTANT

## 2019-01-14 PROCEDURE — 1036F TOBACCO NON-USER: CPT | Performed by: PHYSICIAN ASSISTANT

## 2019-01-14 PROCEDURE — G8484 FLU IMMUNIZE NO ADMIN: HCPCS | Performed by: PHYSICIAN ASSISTANT

## 2019-01-14 RX ORDER — PIOGLITAZONEHYDROCHLORIDE 15 MG/1
15 TABLET ORAL DAILY
Qty: 30 TABLET | Refills: 3 | Status: SHIPPED | OUTPATIENT
Start: 2019-01-14 | End: 2019-07-11 | Stop reason: SDUPTHER

## 2019-01-14 ASSESSMENT — ENCOUNTER SYMPTOMS
SORE THROAT: 0
COUGH: 0
NAUSEA: 0
EYE REDNESS: 0
VOMITING: 0
EYE PAIN: 0
SHORTNESS OF BREATH: 0
SINUS PRESSURE: 0
ABDOMINAL PAIN: 0
RHINORRHEA: 0
DIARRHEA: 0
WHEEZING: 0

## 2019-01-24 ENCOUNTER — OFFICE VISIT (OUTPATIENT)
Dept: PULMONOLOGY | Age: 54
End: 2019-01-24
Payer: COMMERCIAL

## 2019-01-24 VITALS
WEIGHT: 180.8 LBS | DIASTOLIC BLOOD PRESSURE: 76 MMHG | OXYGEN SATURATION: 98 % | HEIGHT: 67 IN | TEMPERATURE: 98.2 F | BODY MASS INDEX: 28.38 KG/M2 | HEART RATE: 85 BPM | SYSTOLIC BLOOD PRESSURE: 120 MMHG

## 2019-01-24 DIAGNOSIS — R05.9 COUGH: ICD-10-CM

## 2019-01-24 DIAGNOSIS — J45.20 MILD INTERMITTENT ASTHMA WITHOUT COMPLICATION: Primary | ICD-10-CM

## 2019-01-24 DIAGNOSIS — R91.1 LUNG NODULE: ICD-10-CM

## 2019-01-24 DIAGNOSIS — I51.89 DIASTOLIC DYSFUNCTION: ICD-10-CM

## 2019-01-24 DIAGNOSIS — K21.9 GASTROESOPHAGEAL REFLUX DISEASE WITHOUT ESOPHAGITIS: ICD-10-CM

## 2019-01-24 DIAGNOSIS — G47.33 OSA (OBSTRUCTIVE SLEEP APNEA): ICD-10-CM

## 2019-01-24 DIAGNOSIS — R06.09 DOE (DYSPNEA ON EXERTION): ICD-10-CM

## 2019-01-24 PROCEDURE — G8419 CALC BMI OUT NRM PARAM NOF/U: HCPCS | Performed by: INTERNAL MEDICINE

## 2019-01-24 PROCEDURE — 3017F COLORECTAL CA SCREEN DOC REV: CPT | Performed by: INTERNAL MEDICINE

## 2019-01-24 PROCEDURE — 99213 OFFICE O/P EST LOW 20 MIN: CPT | Performed by: INTERNAL MEDICINE

## 2019-01-24 PROCEDURE — 1036F TOBACCO NON-USER: CPT | Performed by: INTERNAL MEDICINE

## 2019-01-24 PROCEDURE — G8427 DOCREV CUR MEDS BY ELIG CLIN: HCPCS | Performed by: INTERNAL MEDICINE

## 2019-01-24 PROCEDURE — G8484 FLU IMMUNIZE NO ADMIN: HCPCS | Performed by: INTERNAL MEDICINE

## 2019-01-24 RX ORDER — MONTELUKAST SODIUM 10 MG/1
10 TABLET ORAL DAILY
Qty: 30 TABLET | Refills: 3 | Status: SHIPPED | OUTPATIENT
Start: 2019-01-24 | End: 2019-07-11 | Stop reason: SDUPTHER

## 2019-01-24 RX ORDER — FLUTICASONE FUROATE AND VILANTEROL 200; 25 UG/1; UG/1
POWDER RESPIRATORY (INHALATION)
Qty: 1 EACH | Refills: 3 | Status: SHIPPED | OUTPATIENT
Start: 2019-01-24 | End: 2019-07-11 | Stop reason: SDUPTHER

## 2019-02-20 ENCOUNTER — OFFICE VISIT (OUTPATIENT)
Dept: ENDOCRINOLOGY | Age: 54
End: 2019-02-20
Payer: COMMERCIAL

## 2019-02-20 VITALS
BODY MASS INDEX: 28.41 KG/M2 | HEART RATE: 80 BPM | SYSTOLIC BLOOD PRESSURE: 146 MMHG | WEIGHT: 181 LBS | DIASTOLIC BLOOD PRESSURE: 82 MMHG | HEIGHT: 67 IN

## 2019-02-20 DIAGNOSIS — E78.2 MIXED HYPERLIPIDEMIA: ICD-10-CM

## 2019-02-20 LAB — GLUCOSE BLD-MCNC: 166 MG/DL

## 2019-02-20 PROCEDURE — 99214 OFFICE O/P EST MOD 30 MIN: CPT | Performed by: PHYSICIAN ASSISTANT

## 2019-02-20 PROCEDURE — 82962 GLUCOSE BLOOD TEST: CPT | Performed by: PHYSICIAN ASSISTANT

## 2019-02-20 PROCEDURE — 1036F TOBACCO NON-USER: CPT | Performed by: PHYSICIAN ASSISTANT

## 2019-02-20 PROCEDURE — 2022F DILAT RTA XM EVC RTNOPTHY: CPT | Performed by: PHYSICIAN ASSISTANT

## 2019-02-20 PROCEDURE — G8484 FLU IMMUNIZE NO ADMIN: HCPCS | Performed by: PHYSICIAN ASSISTANT

## 2019-02-20 PROCEDURE — G8419 CALC BMI OUT NRM PARAM NOF/U: HCPCS | Performed by: PHYSICIAN ASSISTANT

## 2019-02-20 PROCEDURE — 3046F HEMOGLOBIN A1C LEVEL >9.0%: CPT | Performed by: PHYSICIAN ASSISTANT

## 2019-02-20 PROCEDURE — G8427 DOCREV CUR MEDS BY ELIG CLIN: HCPCS | Performed by: PHYSICIAN ASSISTANT

## 2019-02-20 PROCEDURE — 3017F COLORECTAL CA SCREEN DOC REV: CPT | Performed by: PHYSICIAN ASSISTANT

## 2019-02-20 ASSESSMENT — ENCOUNTER SYMPTOMS
SINUS PRESSURE: 0
ABDOMINAL PAIN: 0
SORE THROAT: 0
DIARRHEA: 0
WHEEZING: 0
EYE PAIN: 0
EYE REDNESS: 0
SHORTNESS OF BREATH: 0
RHINORRHEA: 0
COUGH: 0
NAUSEA: 0
VOMITING: 0

## 2019-03-07 ENCOUNTER — HOSPITAL ENCOUNTER (OUTPATIENT)
Dept: CT IMAGING | Age: 54
Discharge: HOME | End: 2019-03-09
Payer: COMMERCIAL

## 2019-03-07 DIAGNOSIS — R91.1 LUNG NODULE: ICD-10-CM

## 2019-03-07 PROCEDURE — 71250 CT THORAX DX C-: CPT

## 2019-03-08 ENCOUNTER — OFFICE VISIT (OUTPATIENT)
Dept: CARDIOLOGY CLINIC | Age: 54
End: 2019-03-08
Payer: COMMERCIAL

## 2019-03-08 VITALS
BODY MASS INDEX: 27.56 KG/M2 | SYSTOLIC BLOOD PRESSURE: 110 MMHG | WEIGHT: 175.6 LBS | HEART RATE: 78 BPM | HEIGHT: 67 IN | DIASTOLIC BLOOD PRESSURE: 60 MMHG

## 2019-03-08 DIAGNOSIS — E78.2 MIXED HYPERLIPIDEMIA: ICD-10-CM

## 2019-03-08 DIAGNOSIS — Z00.00 PE (PHYSICAL EXAM), ROUTINE: Primary | ICD-10-CM

## 2019-03-08 PROCEDURE — G8484 FLU IMMUNIZE NO ADMIN: HCPCS | Performed by: INTERNAL MEDICINE

## 2019-03-08 PROCEDURE — G8419 CALC BMI OUT NRM PARAM NOF/U: HCPCS | Performed by: INTERNAL MEDICINE

## 2019-03-08 PROCEDURE — 93000 ELECTROCARDIOGRAM COMPLETE: CPT | Performed by: INTERNAL MEDICINE

## 2019-03-08 PROCEDURE — G8427 DOCREV CUR MEDS BY ELIG CLIN: HCPCS | Performed by: INTERNAL MEDICINE

## 2019-03-08 PROCEDURE — 3017F COLORECTAL CA SCREEN DOC REV: CPT | Performed by: INTERNAL MEDICINE

## 2019-03-08 PROCEDURE — 99213 OFFICE O/P EST LOW 20 MIN: CPT | Performed by: INTERNAL MEDICINE

## 2019-03-08 PROCEDURE — 1036F TOBACCO NON-USER: CPT | Performed by: INTERNAL MEDICINE

## 2019-03-08 RX ORDER — LISINOPRIL 5 MG/1
5 TABLET ORAL DAILY
Qty: 30 TABLET | Refills: 3 | Status: SHIPPED | OUTPATIENT
Start: 2019-03-08 | End: 2019-10-10 | Stop reason: SDUPTHER

## 2019-03-08 RX ORDER — ATORVASTATIN CALCIUM 10 MG/1
10 TABLET, FILM COATED ORAL DAILY
Qty: 30 TABLET | Refills: 3 | Status: SHIPPED | OUTPATIENT
Start: 2019-03-08 | End: 2019-10-10 | Stop reason: SDUPTHER

## 2019-03-25 DIAGNOSIS — E78.2 MIXED HYPERLIPIDEMIA: ICD-10-CM

## 2019-03-25 LAB
ANION GAP SERPL CALCULATED.3IONS-SCNC: 16 MEQ/L (ref 9–15)
BUN BLDV-MCNC: 15 MG/DL (ref 6–20)
CALCIUM SERPL-MCNC: 9.2 MG/DL (ref 8.5–9.9)
CHLORIDE BLD-SCNC: 101 MEQ/L (ref 95–107)
CHOLESTEROL, TOTAL: 187 MG/DL (ref 0–199)
CO2: 25 MEQ/L (ref 20–31)
CREAT SERPL-MCNC: 1.11 MG/DL (ref 0.7–1.2)
GFR AFRICAN AMERICAN: >60
GFR NON-AFRICAN AMERICAN: >60
GLUCOSE BLD-MCNC: 115 MG/DL (ref 70–99)
HBA1C MFR BLD: 7.3 % (ref 4.8–5.9)
HDLC SERPL-MCNC: 31 MG/DL (ref 40–59)
LDL CHOLESTEROL CALCULATED: ABNORMAL MG/DL (ref 0–129)
POTASSIUM SERPL-SCNC: 4.6 MEQ/L (ref 3.4–4.9)
SODIUM BLD-SCNC: 142 MEQ/L (ref 135–144)
TRIGL SERPL-MCNC: 432 MG/DL (ref 0–150)

## 2019-03-26 ENCOUNTER — OFFICE VISIT (OUTPATIENT)
Dept: ENDOCRINOLOGY | Age: 54
End: 2019-03-26
Payer: COMMERCIAL

## 2019-03-26 VITALS
WEIGHT: 172 LBS | BODY MASS INDEX: 27 KG/M2 | DIASTOLIC BLOOD PRESSURE: 70 MMHG | HEART RATE: 90 BPM | HEIGHT: 67 IN | SYSTOLIC BLOOD PRESSURE: 132 MMHG

## 2019-03-26 LAB
CREATININE URINE: 100.9 MG/DL
GLUCOSE BLD-MCNC: 182 MG/DL
MICROALBUMIN UR-MCNC: 2.9 MG/DL
MICROALBUMIN/CREAT UR-RTO: 28.7 MG/G (ref 0–30)

## 2019-03-26 PROCEDURE — G8484 FLU IMMUNIZE NO ADMIN: HCPCS | Performed by: PHYSICIAN ASSISTANT

## 2019-03-26 PROCEDURE — 3045F PR MOST RECENT HEMOGLOBIN A1C LEVEL 7.0-9.0%: CPT | Performed by: PHYSICIAN ASSISTANT

## 2019-03-26 PROCEDURE — G8427 DOCREV CUR MEDS BY ELIG CLIN: HCPCS | Performed by: PHYSICIAN ASSISTANT

## 2019-03-26 PROCEDURE — G8419 CALC BMI OUT NRM PARAM NOF/U: HCPCS | Performed by: PHYSICIAN ASSISTANT

## 2019-03-26 PROCEDURE — 82962 GLUCOSE BLOOD TEST: CPT | Performed by: PHYSICIAN ASSISTANT

## 2019-03-26 PROCEDURE — 1036F TOBACCO NON-USER: CPT | Performed by: PHYSICIAN ASSISTANT

## 2019-03-26 PROCEDURE — 99214 OFFICE O/P EST MOD 30 MIN: CPT | Performed by: PHYSICIAN ASSISTANT

## 2019-03-26 PROCEDURE — 3017F COLORECTAL CA SCREEN DOC REV: CPT | Performed by: PHYSICIAN ASSISTANT

## 2019-03-26 PROCEDURE — 2022F DILAT RTA XM EVC RTNOPTHY: CPT | Performed by: PHYSICIAN ASSISTANT

## 2019-03-26 RX ORDER — OMEGA-3-ACID ETHYL ESTERS 1 G/1
2 CAPSULE, LIQUID FILLED ORAL 2 TIMES DAILY
Qty: 60 CAPSULE | Refills: 3 | Status: SHIPPED | OUTPATIENT
Start: 2019-03-26 | End: 2019-11-19 | Stop reason: SDUPTHER

## 2019-03-26 RX ORDER — FENOFIBRATE 145 MG/1
145 TABLET, COATED ORAL DAILY
Qty: 30 TABLET | Refills: 3 | Status: SHIPPED | OUTPATIENT
Start: 2019-03-26 | End: 2020-09-24 | Stop reason: SDUPTHER

## 2019-03-26 ASSESSMENT — ENCOUNTER SYMPTOMS
WHEEZING: 0
DIARRHEA: 0
SHORTNESS OF BREATH: 0
SORE THROAT: 0
SINUS PRESSURE: 0
RHINORRHEA: 0
NAUSEA: 0
ABDOMINAL PAIN: 0
EYE PAIN: 0
EYE REDNESS: 0
COUGH: 0
VOMITING: 0

## 2019-03-28 LAB
SEX HORMONE BINDING GLOBULIN: 59 NMOL/L (ref 11–80)
TESTOSTERONE FREE PERCENT: 1.3 % (ref 1.6–2.9)
TESTOSTERONE FREE, CALC: 67 PG/ML (ref 47–244)
TESTOSTERONE TOTAL-MALE: 510 NG/DL (ref 300–890)

## 2019-04-01 RX ORDER — OMEPRAZOLE 40 MG/1
40 CAPSULE, DELAYED RELEASE ORAL DAILY
Qty: 30 CAPSULE | Refills: 11 | Status: SHIPPED | OUTPATIENT
Start: 2019-04-01 | End: 2022-09-16

## 2019-04-03 RX ORDER — LANCETS 28 GAUGE
EACH MISCELLANEOUS
Qty: 100 EACH | Refills: 6 | Status: SHIPPED | OUTPATIENT
Start: 2019-04-03 | End: 2020-03-24

## 2019-04-03 RX ORDER — PEN NEEDLE, DIABETIC 29 G X1/2"
NEEDLE, DISPOSABLE MISCELLANEOUS
Qty: 100 EACH | Refills: 3 | Status: SHIPPED | OUTPATIENT
Start: 2019-04-03 | End: 2019-11-19 | Stop reason: SDUPTHER

## 2019-04-05 RX ORDER — CARVEDILOL 6.25 MG/1
TABLET ORAL
Qty: 60 TABLET | Refills: 6 | Status: SHIPPED | OUTPATIENT
Start: 2019-04-05 | End: 2020-03-24

## 2019-04-11 ENCOUNTER — TELEPHONE (OUTPATIENT)
Dept: ENDOCRINOLOGY | Age: 54
End: 2019-04-11

## 2019-06-26 ENCOUNTER — OFFICE VISIT (OUTPATIENT)
Dept: ENDOCRINOLOGY | Age: 54
End: 2019-06-26
Payer: COMMERCIAL

## 2019-06-26 VITALS
WEIGHT: 173 LBS | HEIGHT: 67 IN | BODY MASS INDEX: 27.15 KG/M2 | DIASTOLIC BLOOD PRESSURE: 60 MMHG | SYSTOLIC BLOOD PRESSURE: 106 MMHG | HEART RATE: 84 BPM

## 2019-06-26 LAB
ALBUMIN SERPL-MCNC: 4.7 G/DL (ref 3.5–4.6)
ALP BLD-CCNC: 88 U/L (ref 35–104)
ALT SERPL-CCNC: 12 U/L (ref 0–41)
ANION GAP SERPL CALCULATED.3IONS-SCNC: 13 MEQ/L (ref 9–15)
AST SERPL-CCNC: 19 U/L (ref 0–40)
BILIRUB SERPL-MCNC: 0.6 MG/DL (ref 0.2–0.7)
BUN BLDV-MCNC: 22 MG/DL (ref 6–20)
CALCIUM SERPL-MCNC: 9.1 MG/DL (ref 8.5–9.9)
CHLORIDE BLD-SCNC: 99 MEQ/L (ref 95–107)
CHP ED QC CHECK: NORMAL
CO2: 24 MEQ/L (ref 20–31)
CREAT SERPL-MCNC: 1.52 MG/DL (ref 0.7–1.2)
GFR AFRICAN AMERICAN: 58.4
GFR NON-AFRICAN AMERICAN: 48.3
GLOBULIN: 3.1 G/DL (ref 2.3–3.5)
GLUCOSE BLD-MCNC: 142 MG/DL (ref 70–99)
GLUCOSE BLD-MCNC: 160 MG/DL
HBA1C MFR BLD: 7.4 % (ref 4.8–5.9)
HBA1C MFR BLD: 7.5 %
POTASSIUM SERPL-SCNC: 4.8 MEQ/L (ref 3.4–4.9)
SODIUM BLD-SCNC: 136 MEQ/L (ref 135–144)
TOTAL PROTEIN: 7.8 G/DL (ref 6.3–8)

## 2019-06-26 PROCEDURE — 99214 OFFICE O/P EST MOD 30 MIN: CPT | Performed by: PHYSICIAN ASSISTANT

## 2019-06-26 PROCEDURE — 1036F TOBACCO NON-USER: CPT | Performed by: PHYSICIAN ASSISTANT

## 2019-06-26 PROCEDURE — 2022F DILAT RTA XM EVC RTNOPTHY: CPT | Performed by: PHYSICIAN ASSISTANT

## 2019-06-26 PROCEDURE — 83036 HEMOGLOBIN GLYCOSYLATED A1C: CPT | Performed by: PHYSICIAN ASSISTANT

## 2019-06-26 PROCEDURE — G8419 CALC BMI OUT NRM PARAM NOF/U: HCPCS | Performed by: PHYSICIAN ASSISTANT

## 2019-06-26 PROCEDURE — 3045F PR MOST RECENT HEMOGLOBIN A1C LEVEL 7.0-9.0%: CPT | Performed by: PHYSICIAN ASSISTANT

## 2019-06-26 PROCEDURE — G8427 DOCREV CUR MEDS BY ELIG CLIN: HCPCS | Performed by: PHYSICIAN ASSISTANT

## 2019-06-26 PROCEDURE — 3017F COLORECTAL CA SCREEN DOC REV: CPT | Performed by: PHYSICIAN ASSISTANT

## 2019-06-26 PROCEDURE — 82962 GLUCOSE BLOOD TEST: CPT | Performed by: PHYSICIAN ASSISTANT

## 2019-06-26 ASSESSMENT — ENCOUNTER SYMPTOMS
ABDOMINAL PAIN: 0
DIARRHEA: 0
SORE THROAT: 0
EYE REDNESS: 0
VOMITING: 0
RHINORRHEA: 0
SHORTNESS OF BREATH: 0
SINUS PRESSURE: 0
COUGH: 0
NAUSEA: 0
EYE PAIN: 0
WHEEZING: 0

## 2019-06-26 NOTE — PROGRESS NOTES
headaches. There are no diabetic associated symptoms. Pertinent negatives for diabetes include no chest pain, no fatigue, no polydipsia and no polyuria. There are no hypoglycemic complications. There are no diabetic complications. Risk factors for coronary artery disease include dyslipidemia, male sex and hypertension. Current diabetic treatment includes insulin injections and oral agent (dual therapy). He is compliant with treatment all of the time. He is following a generally healthy diet. Meal planning includes avoidance of concentrated sweets and carbohydrate counting. He rarely participates in exercise. His overall blood glucose range is 140-180 mg/dl. An ACE inhibitor/angiotensin II receptor blocker is not being taken. He does not see a podiatrist.Eye exam is current.        Past Medical History:   Diagnosis Date    Acid reflux disease with ulcer 03/19/2018    Diabetes mellitus ([address]     BUCKLEY (dyspnea on exertion) 2/2/2018    Hypertension     Mixed hyperlipidemia 2/2/2018       Current Outpatient Medications:     metFORMIN (GLUCOPHAGE) 500 MG tablet, Take 1 tablet by mouth 2 times daily (with meals), Disp: 60 tablet, Rfl: 3    carvedilol (COREG) 6.25 MG tablet, take 1 tablet by mouth twice a day with food, Disp: 60 tablet, Rfl: 6    blood glucose test strips (FREESTYLE LITE) strip, TEST three times a day, Disp: 100 strip, Rfl: 6    BD INSULIN SYRINGE U/F 31G X 5/16\" 0.5 ML MISC, use TO INJECT four times a day, Disp: 100 each, Rfl: 3    FREESTYLE LANCETS MISC, TEST three times a day, Disp: 100 each, Rfl: 6    omeprazole (PRILOSEC) 40 MG delayed release capsule, Take 1 capsule by mouth daily, Disp: 30 capsule, Rfl: 11    fenofibrate (TRICOR) 145 MG tablet, Take 1 tablet by mouth daily, Disp: 30 tablet, Rfl: 3    omega-3 acid ethyl esters (LOVAZA) 1 g capsule, Take 2 capsules by mouth 2 times daily, Disp: 60 capsule, Rfl: 3    atorvastatin (LIPITOR) 10 MG tablet, Take 1 tablet by mouth daily, Disp: 30 tablet, Rfl: 3    lisinopril (ZESTRIL) 5 MG tablet, Take 1 tablet by mouth daily, Disp: 30 tablet, Rfl: 3    aspirin 81 MG tablet, Take 81 mg by mouth daily, Disp: , Rfl:     Dulaglutide (TRULICITY) 1.5 ND/5.0XK SOPN, Inject 1.5 mg into the skin once a week, Disp: 4 pen, Rfl: 3    montelukast (SINGULAIR) 10 MG tablet, Take 1 tablet by mouth daily, Disp: 30 tablet, Rfl: 3    Fluticasone Furoate-Vilanterol (BREO ELLIPTA) 200-25 MCG/INH AEPB, inhale 1 dose by mouth daily, Disp: 1 each, Rfl: 3    insulin NPH (HUMULIN N) 100 UNIT/ML injection vial, 45 units at bedtime, Disp: 2 vial, Rfl: 3    pioglitazone (ACTOS) 15 MG tablet, Take 1 tablet by mouth daily, Disp: 30 tablet, Rfl: 3    Ertugliflozin L-PyroglutamicAc (STEGLATRO) 15 MG TABS, Take 15 mg by mouth daily, Disp: 30 tablet, Rfl: 3    insulin regular (HUMULIN R) 100 UNIT/ML injection, 25 UNITS AT EACH MEALS, Disp: 3 vial, Rfl: 3    Continuous Blood Gluc  (FREESTYLE RAFAEL 14 DAY READER) KYM, As directed, Disp: 1 Device, Rfl: 00    Continuous Blood Gluc Sensor (FREESTYLE RAFEAL 14 DAY SENSOR) MISC, As directed, Disp: 2 each, Rfl: 03    albuterol sulfate HFA (PROAIR HFA) 108 (90 Base) MCG/ACT inhaler, Inhale 2 puffs into the lungs every 6 hours as needed for Wheezing, Disp: 1 Inhaler, Rfl: 3    Insulin Syringe-Needle U-100 31G X 5/16\" 1 ML MISC, 1 each by Does not apply route 4 times daily, Disp: 150 each, Rfl: 3  Lab Results   Component Value Date     03/25/2019    K 4.6 03/25/2019     03/25/2019    CO2 25 03/25/2019    BUN 15 03/25/2019    CREATININE 1.11 03/25/2019    GLUCOSE 160 06/26/2019    CALCIUM 9.2 03/25/2019    PROT 7.2 04/25/2018    LABALBU 4.4 04/25/2018    BILITOT 0.5 04/25/2018    ALKPHOS 138 (H) 11/02/2018    AST 21 04/25/2018    ALT 13 04/25/2018    LABGLOM >60.0 03/25/2019    GFRAA >60.0 03/25/2019    GLOB 3.3 01/26/2018     Lab Results   Component Value Date    WBC 8.0 01/26/2018    HGB 13.6 (L) 03/19/2018    HCT 39.3 (L) 03/19/2018    MCV 86.6 01/26/2018     01/26/2018     Lab Results   Component Value Date    LABA1C 7.5 06/26/2019    LABA1C 7.3 (H) 03/25/2019    LABA1C 9.5 12/07/2018     Lab Results   Component Value Date    CHOL 187 03/25/2019    CHOL 190 01/26/2018    CHOL 49 11/06/2017      Lab Results   Component Value Date    TRIG 432 (H) 03/25/2019    TRIG 317 (H) 01/26/2018    TRIG 91 11/06/2017     Lab Results   Component Value Date    HDL 31 (L) 03/25/2019    HDL 34 (L) 01/26/2018    HDL 6 (L) 11/06/2017     Lab Results   Component Value Date    LDLCALC see below 03/25/2019    LDLCALC 93 01/26/2018    LDLCALC 25 11/06/2017     No results found for: LABVLDL, VLDL  No results found for: CHOLHDLRATIO  No results found for: TESTOSTERONE  No results found for: TSH, V1SRSYP, R6BZQAC, THYROIDAB        No Known Allergies    Past Surgical History:   Procedure Laterality Date    CYSTOSCOPY Right 12/13/2017    CYSTOSCOPY RIGHT  RETROGRADE PYELOGRAM AND REMOVAL OF RIGHT DJ STENT (LABS DONE 11/27) performed by Jeannie Anderson MD at [address] [place] [address] / [facility] / [facility] Right 11/8/2017    CYSTOSCOPY RIGHT DOUBLE J  STENT INSERTION performed by Jeannie Anderson MD at [address] [place] [address] [place] FLX DX W/COLLJ Soisela 1977 PFRMD N/A 3/19/2018    COLONOSCOPY performed by Jossy Gray MD at [facility] 61 ESOPHAGOGASTRODUODENOSCOPY TRANSORAL DIAGNOSTIC N/A 3/19/2018    EGD ESOPHAGOGASTRODUODENOSCOPY performed by Jossy Gray MD at [address] [place] [address] Marital status:      Spouse name: Not on file    Number of children: Not on file    Years of education: Not on file    Highest education level: Not on file   Occupational History    Not on file   Social Needs    Financial resource strain: Not on file    Food insecurity:     Worry: Not on file     Inability: Not on file    Transportation needs:     Medical: Not on file     Non-medical: Not on file   Tobacco Use    Smoking status: Never Smoker    Smokeless tobacco: Never Used   Substance and Sexual Activity    Alcohol use: Yes     Comment: occasional    Drug use: No    Sexual activity: Not on file   Lifestyle    Physical activity:     Days per week: Not on file     Minutes per session: Not on file    Stress: Not on file   Relationships    Social connections:     Talks on phone: Not on file     Gets together: Not on file     Attends [employer] service: Not on file     Active member of club or organization: Not on file     Attends meetings of clubs or organizations: Not on file     Relationship status: Not on file    Intimate partner violence:     Fear of current or ex partner: Not on file     Emotionally abused: Not on file     Physically abused: Not on file     Forced sexual activity: Not on file   Other Topics Concern    Not on file   Social History Narrative    Not on file     Family History   Problem Relation Age of Onset    Hypertension Father     Heart Disease Father          Review of Systems   Constitutional: Negative for chills, fatigue and fever. HENT: Negative for congestion, ear pain, postnasal drip, rhinorrhea, sinus pressure and sore throat. Eyes: Negative for pain and redness. Respiratory: Negative for cough, shortness of breath and wheezing. Cardiovascular: Negative for chest pain, palpitations and leg swelling. Gastrointestinal: Negative for abdominal pain, diarrhea, nausea and vomiting. Endocrine: Negative for cold intolerance, heat intolerance, polydipsia and polyuria. Genitourinary: Negative for difficulty urinating. Musculoskeletal: Negative for arthralgias. Skin: Negative for rash. Neurological: Positive for tremors (With hypoglycemia). Negative for dizziness and headaches. Peripheral neuropathy   Hematological: Negative for adenopathy. Psychiatric/Behavioral: Negative for agitation.        Objective: Physical Exam   Constitutional: He is oriented to person, place, and time. He appears well-developed and well-nourished. HENT:   Head: Normocephalic and atraumatic. Eyes: Conjunctivae and EOM are normal.   Neck: Normal range of motion. Neck supple. No thyromegaly present. Cardiovascular: Normal rate, regular rhythm and normal heart sounds. Pulmonary/Chest: Effort normal and breath sounds normal.   Abdominal: Soft. Bowel sounds are normal. He exhibits no distension. There is no tenderness. Musculoskeletal: Normal range of motion. He exhibits tenderness. He exhibits no edema. Right second toe with mild erythema on the anterior surface, good range of motion, no signs of cellulitis or infection. Lymphadenopathy:     He has no cervical adenopathy. Neurological: He is alert and oriented to person, place, and time. Skin: Skin is warm and dry.

## 2019-07-11 ENCOUNTER — TELEPHONE (OUTPATIENT)
Dept: INTERNAL MEDICINE CLINIC | Age: 54
End: 2019-07-11

## 2019-07-11 DIAGNOSIS — J45.20 MILD INTERMITTENT ASTHMA WITHOUT COMPLICATION: ICD-10-CM

## 2019-07-11 DIAGNOSIS — R05.9 COUGH: ICD-10-CM

## 2019-07-11 DIAGNOSIS — R06.09 DOE (DYSPNEA ON EXERTION): ICD-10-CM

## 2019-07-11 RX ORDER — FLUTICASONE FUROATE AND VILANTEROL 200; 25 UG/1; UG/1
POWDER RESPIRATORY (INHALATION)
Qty: 1 EACH | Refills: 1 | Status: SHIPPED | OUTPATIENT
Start: 2019-07-11 | End: 2022-09-16

## 2019-07-11 RX ORDER — ERTUGLIFLOZIN 15 MG/1
TABLET, FILM COATED ORAL
Qty: 30 TABLET | Refills: 3 | Status: SHIPPED | OUTPATIENT
Start: 2019-07-11 | End: 2020-05-01

## 2019-07-11 RX ORDER — MONTELUKAST SODIUM 10 MG/1
TABLET ORAL
Qty: 30 TABLET | Refills: 3 | Status: SHIPPED | OUTPATIENT
Start: 2019-07-11 | End: 2020-03-25

## 2019-07-11 RX ORDER — PIOGLITAZONEHYDROCHLORIDE 15 MG/1
TABLET ORAL
Qty: 30 TABLET | Refills: 3 | Status: SHIPPED | OUTPATIENT
Start: 2019-07-11 | End: 2020-02-07

## 2019-09-26 ENCOUNTER — OFFICE VISIT (OUTPATIENT)
Dept: ENDOCRINOLOGY | Age: 54
End: 2019-09-26
Payer: COMMERCIAL

## 2019-09-26 VITALS
SYSTOLIC BLOOD PRESSURE: 110 MMHG | HEIGHT: 67 IN | WEIGHT: 167 LBS | HEART RATE: 77 BPM | DIASTOLIC BLOOD PRESSURE: 66 MMHG | BODY MASS INDEX: 26.21 KG/M2

## 2019-09-26 LAB
CHP ED QC CHECK: NORMAL
GLUCOSE BLD-MCNC: 106 MG/DL
HBA1C MFR BLD: 7 %

## 2019-09-26 PROCEDURE — 3045F PR MOST RECENT HEMOGLOBIN A1C LEVEL 7.0-9.0%: CPT | Performed by: PHYSICIAN ASSISTANT

## 2019-09-26 PROCEDURE — 2022F DILAT RTA XM EVC RTNOPTHY: CPT | Performed by: PHYSICIAN ASSISTANT

## 2019-09-26 PROCEDURE — 82962 GLUCOSE BLOOD TEST: CPT | Performed by: PHYSICIAN ASSISTANT

## 2019-09-26 PROCEDURE — 83036 HEMOGLOBIN GLYCOSYLATED A1C: CPT | Performed by: PHYSICIAN ASSISTANT

## 2019-09-26 PROCEDURE — 3017F COLORECTAL CA SCREEN DOC REV: CPT | Performed by: PHYSICIAN ASSISTANT

## 2019-09-26 PROCEDURE — 99214 OFFICE O/P EST MOD 30 MIN: CPT | Performed by: PHYSICIAN ASSISTANT

## 2019-09-26 PROCEDURE — G8427 DOCREV CUR MEDS BY ELIG CLIN: HCPCS | Performed by: PHYSICIAN ASSISTANT

## 2019-09-26 PROCEDURE — G8419 CALC BMI OUT NRM PARAM NOF/U: HCPCS | Performed by: PHYSICIAN ASSISTANT

## 2019-09-26 PROCEDURE — 1036F TOBACCO NON-USER: CPT | Performed by: PHYSICIAN ASSISTANT

## 2019-09-26 ASSESSMENT — ENCOUNTER SYMPTOMS
NAUSEA: 0
RHINORRHEA: 1
VOMITING: 0
COUGH: 0
WHEEZING: 0
ABDOMINAL PAIN: 0
DIARRHEA: 0
SORE THROAT: 1
EYE REDNESS: 0
EYE PAIN: 0
SINUS PRESSURE: 0
SHORTNESS OF BREATH: 0

## 2019-09-26 NOTE — PROGRESS NOTES
01/26/2018    CHOL 49 11/06/2017      Lab Results   Component Value Date    TRIG 432 (H) 03/25/2019    TRIG 317 (H) 01/26/2018    TRIG 91 11/06/2017     Lab Results   Component Value Date    HDL 31 (L) 03/25/2019    HDL 34 (L) 01/26/2018    HDL 6 (L) 11/06/2017     Lab Results   Component Value Date    LDLCALC see below 03/25/2019    [address] 01/26/2018    LDLCALC 25 11/06/2017     No results found for: LABVLDL, VLDL  No results found for: CHOLHDLRATIO  No results found for: TESTOSTERONE  No results found for: TSH, Z3HNMDZ, N3YBIBD, THYROIDAB        No Known Allergies    Past Surgical History:   Procedure Laterality Date    CYSTOSCOPY Right 12/13/2017    CYSTOSCOPY RIGHT  RETROGRADE PYELOGRAM AND REMOVAL OF RIGHT DJ STENT (LABS DONE 11/27) performed by Marleni Stein MD at [address] [place] [address] / [facility] / [facility] Right 11/8/2017    CYSTOSCOPY RIGHT DOUBLE J  STENT INSERTION performed by Marleni Stein MD at [address] [place] [address] [place] FLX DX W/COLLJ [address] 1977 PFRMD N/A 3/19/2018    COLONOSCOPY performed by Espinoza Lynch MD at . [facility] 61 ESOPHAGOGASTRODUODENOSCOPY TRANSORAL DIAGNOSTIC N/A 3/19/2018    EGD ESOPHAGOGASTRODUODENOSCOPY performed by Espinoza Lynch MD at [address] [place] [address] Marital status:      Spouse name: Not on file    Number of children: Not on file    Years of education: Not on file    Highest education level: Not on file   Occupational History    Not on file   Social Needs    Financial resource strain: Not on file    Food insecurity:     Worry: Not on file     Inability: Not on file    Transportation needs:     Medical: Not on file     Non-medical: Not on file   Tobacco Use    Smoking status: Never Smoker    Smokeless tobacco: Never Used   Substance and Sexual Activity    Alcohol use: Yes     Comment: occasional    Drug use: No    Sexual activity: Not on file motion. Neck supple. No thyromegaly present. Cardiovascular: Normal rate, regular rhythm and normal heart sounds. No murmur heard. Pulmonary/Chest: Effort normal and breath sounds normal. No respiratory distress. Abdominal: Soft. Bowel sounds are normal. He exhibits no distension. There is no tenderness. Musculoskeletal: Normal range of motion. He exhibits no edema or tenderness. Lymphadenopathy:     He has no cervical adenopathy. Neurological: He is alert and oriented to person, place, and time. Skin: Skin is warm and dry.

## 2019-10-10 DIAGNOSIS — E78.2 MIXED HYPERLIPIDEMIA: Primary | ICD-10-CM

## 2019-10-11 RX ORDER — ATORVASTATIN CALCIUM 10 MG/1
TABLET, FILM COATED ORAL
Qty: 30 TABLET | Refills: 3 | Status: SHIPPED | OUTPATIENT
Start: 2019-10-11 | End: 2020-03-25

## 2019-10-11 RX ORDER — DULAGLUTIDE 1.5 MG/.5ML
INJECTION, SOLUTION SUBCUTANEOUS
Qty: 2 ML | Refills: 3 | Status: SHIPPED | OUTPATIENT
Start: 2019-10-11 | End: 2020-03-24

## 2019-10-11 RX ORDER — LISINOPRIL 5 MG/1
TABLET ORAL
Qty: 30 TABLET | Refills: 3 | Status: SHIPPED | OUTPATIENT
Start: 2019-10-11 | End: 2020-03-25

## 2019-11-20 RX ORDER — PEN NEEDLE, DIABETIC 29 G X1/2"
NEEDLE, DISPOSABLE MISCELLANEOUS
Qty: 200 EACH | Refills: 3 | Status: SHIPPED | OUTPATIENT
Start: 2019-11-20 | End: 2022-09-16

## 2019-11-20 RX ORDER — OMEGA-3-ACID ETHYL ESTERS 1 G/1
CAPSULE, LIQUID FILLED ORAL
Qty: 120 CAPSULE | Refills: 3 | Status: SHIPPED | OUTPATIENT
Start: 2019-11-20 | End: 2020-05-01

## 2019-12-16 LAB
ALBUMIN SERPL-MCNC: 4.4 G/DL (ref 3.5–4.6)
ALP BLD-CCNC: 116 U/L (ref 35–104)
ALT SERPL-CCNC: 19 U/L (ref 0–41)
ANION GAP SERPL CALCULATED.3IONS-SCNC: 15 MEQ/L (ref 9–15)
AST SERPL-CCNC: 23 U/L (ref 0–40)
BILIRUB SERPL-MCNC: 0.4 MG/DL (ref 0.2–0.7)
BUN BLDV-MCNC: 28 MG/DL (ref 6–20)
CALCIUM SERPL-MCNC: 8.4 MG/DL (ref 8.5–9.9)
CHLORIDE BLD-SCNC: 101 MEQ/L (ref 95–107)
CHOLESTEROL, TOTAL: 145 MG/DL (ref 0–199)
CO2: 23 MEQ/L (ref 20–31)
CREAT SERPL-MCNC: 1.26 MG/DL (ref 0.7–1.2)
GFR AFRICAN AMERICAN: >60
GFR NON-AFRICAN AMERICAN: 59.9
GLOBULIN: 3 G/DL (ref 2.3–3.5)
GLUCOSE BLD-MCNC: 116 MG/DL (ref 70–99)
HBA1C MFR BLD: 6.5 % (ref 4.8–5.9)
HDLC SERPL-MCNC: 52 MG/DL (ref 40–59)
LDL CHOLESTEROL CALCULATED: 49 MG/DL (ref 0–129)
POTASSIUM SERPL-SCNC: 4.4 MEQ/L (ref 3.4–4.9)
SODIUM BLD-SCNC: 139 MEQ/L (ref 135–144)
TOTAL PROTEIN: 7.4 G/DL (ref 6.3–8)
TRIGL SERPL-MCNC: 218 MG/DL (ref 0–150)

## 2020-01-06 ENCOUNTER — OFFICE VISIT (OUTPATIENT)
Dept: ENDOCRINOLOGY | Age: 55
End: 2020-01-06
Payer: COMMERCIAL

## 2020-01-06 VITALS
DIASTOLIC BLOOD PRESSURE: 70 MMHG | HEIGHT: 67 IN | BODY MASS INDEX: 27.62 KG/M2 | SYSTOLIC BLOOD PRESSURE: 136 MMHG | HEART RATE: 86 BPM | WEIGHT: 176 LBS

## 2020-01-06 PROBLEM — N52.8 OTHER MALE ERECTILE DYSFUNCTION: Status: ACTIVE | Noted: 2020-01-06

## 2020-01-06 LAB
CHP ED QC CHECK: NORMAL
GLUCOSE BLD-MCNC: 144 MG/DL

## 2020-01-06 PROCEDURE — G8419 CALC BMI OUT NRM PARAM NOF/U: HCPCS | Performed by: PHYSICIAN ASSISTANT

## 2020-01-06 PROCEDURE — G8484 FLU IMMUNIZE NO ADMIN: HCPCS | Performed by: PHYSICIAN ASSISTANT

## 2020-01-06 PROCEDURE — 82962 GLUCOSE BLOOD TEST: CPT | Performed by: PHYSICIAN ASSISTANT

## 2020-01-06 PROCEDURE — 3017F COLORECTAL CA SCREEN DOC REV: CPT | Performed by: PHYSICIAN ASSISTANT

## 2020-01-06 PROCEDURE — G8427 DOCREV CUR MEDS BY ELIG CLIN: HCPCS | Performed by: PHYSICIAN ASSISTANT

## 2020-01-06 PROCEDURE — 99214 OFFICE O/P EST MOD 30 MIN: CPT | Performed by: PHYSICIAN ASSISTANT

## 2020-01-06 PROCEDURE — 3046F HEMOGLOBIN A1C LEVEL >9.0%: CPT | Performed by: PHYSICIAN ASSISTANT

## 2020-01-06 PROCEDURE — 2022F DILAT RTA XM EVC RTNOPTHY: CPT | Performed by: PHYSICIAN ASSISTANT

## 2020-01-06 PROCEDURE — 1036F TOBACCO NON-USER: CPT | Performed by: PHYSICIAN ASSISTANT

## 2020-01-06 RX ORDER — SILDENAFIL 100 MG/1
100 TABLET, FILM COATED ORAL PRN
Qty: 30 TABLET | Refills: 3 | Status: SHIPPED | OUTPATIENT
Start: 2020-01-06 | End: 2022-03-14 | Stop reason: ALTCHOICE

## 2020-01-06 ASSESSMENT — ENCOUNTER SYMPTOMS
RHINORRHEA: 1
NAUSEA: 0
VOMITING: 0
SHORTNESS OF BREATH: 0
EYE PAIN: 0
EYE REDNESS: 0
ABDOMINAL PAIN: 0
SORE THROAT: 1
WHEEZING: 0
SINUS PRESSURE: 0
DIARRHEA: 0
COUGH: 0

## 2020-01-06 NOTE — PROGRESS NOTES
Assessment:       Diagnosis Orders   1. Uncontrolled type 2 diabetes mellitus with complication, with long-term current use of insulin ([facility])  POCT Glucose    Hemoglobin A1C    Comprehensive Metabolic Panel     DIABETES FOOT EXAM    sildenafil (VIAGRA) 100 MG tablet   2. Other male erectile dysfunction  sildenafil (VIAGRA) 100 MG tablet   Average blood glucose 100-150 without significant hyper or hypoglycemic events  PLAN:     1. Insulin only if glucose > 150 (only needed twice in last 2 weeks)   2. Continue Actos 15 mg by mouth daily  3. Continue Steglatro 15 mg po daily  4. Continue Trulicity 1.5 mg weekly   5. Monitor glycemic control closely, markedly improved glucose   6. Once again we Discuss his eye exam from last May indicating diabetic retinopathy with the patient, encouraged him to have his laser surgery completed this year, he states that he has a scheduled, states he will make an appointment this month   7. Sildenafil 100 mg p.o. daily as needed  Orders Placed This Encounter   Procedures    Hemoglobin A1C     Standing Status:   Future     Standing Expiration Date:   1/5/2021    Comprehensive Metabolic Panel     Standing Status:   Future     Standing Expiration Date:   1/6/2021    POCT Glucose     DIABETES FOOT EXAM     Orders Placed This Encounter   Medications    sildenafil (VIAGRA) 100 MG tablet     Sig: Take 1 tablet by mouth as needed for Erectile Dysfunction     Dispense:  30 tablet     Refill:  3       Return in about 3 months (around 4/6/2020) for Diabetes.     Subjective:     Chief Complaint   Patient presents with    Diabetes     Vitals:    01/06/20 1610   BP: 136/70   Site: Right Upper Arm   Position: Sitting   Cuff Size: Large Adult   Pulse: 86   Weight: 176 lb (79.8 kg)   Height: 5' 7\" (1.702 m)     Wt Readings from Last 3 Encounters:   01/06/20 176 lb (79.8 kg)   09/26/19 167 lb (75.8 kg)   06/26/19 173 lb (78.5 kg)     BP Readings from Last 3 Encounters:   01/06/20 136/70   09/26/19 110/66   06/26/19 106/60             Diabetes   He presents for his follow-up diabetic visit. He has type 2 diabetes mellitus. The initial diagnosis of diabetes was made 5 years ago. His disease course has been stable. Hypoglycemia symptoms include sweats and tremors (With hypoglycemia). Pertinent negatives for hypoglycemia include no dizziness or headaches. There are no diabetic associated symptoms. Pertinent negatives for diabetes include no chest pain, no fatigue, no polydipsia and no polyuria. There are no hypoglycemic complications. There are no diabetic complications. Risk factors for coronary artery disease include dyslipidemia, male sex and hypertension. Current diabetic treatment includes insulin injections and oral agent (dual therapy). He is compliant with treatment all of the time. He is following a generally healthy diet. Meal planning includes avoidance of concentrated sweets and carbohydrate counting. He rarely participates in exercise. His home blood glucose trend is decreasing steadily. His overall blood glucose range is 140-180 mg/dl. An ACE inhibitor/angiotensin II receptor blocker is not being taken. He does not see a podiatrist.Eye exam is current.        Past Medical History:   Diagnosis Date    Acid reflux disease with ulcer 03/19/2018    Diabetes mellitus ([address]     BUCKLEY (dyspnea on exertion) 2/2/2018    Hypertension     Mixed hyperlipidemia 2/2/2018       Current Outpatient Medications:     sildenafil (VIAGRA) 100 MG tablet, Take 1 tablet by mouth as needed for Erectile Dysfunction, Disp: 30 tablet, Rfl: 3    omega-3 acid ethyl esters (LOVAZA) 1 g capsule, take 2 capsules by mouth twice a day, Disp: 120 capsule, Rfl: 3    BD INSULIN SYRINGE U/F 31G X 5/16\" 0.5 ML MISC, use four times a day, Disp: 200 each, Rfl: 3    metFORMIN (GLUCOPHAGE) 500 MG tablet, take 1 tablet by mouth twice a day with meals, Disp: 60 tablet, Rfl: 3    TRULICITY 1.5 CD/1.7ZB SOPN, inject 1 subcutaneously  Number of children: Not on file    Years of education: Not on file    Highest education level: Not on file   Occupational History    Not on file   Social Needs    Financial resource strain: Not on file    Food insecurity:     Worry: Not on file     Inability: Not on file    Transportation needs:     Medical: Not on file     Non-medical: Not on file   Tobacco Use    Smoking status: Never Smoker    Smokeless tobacco: Never Used   Substance and Sexual Activity    Alcohol use: Yes     Comment: occasional    Drug use: No    Sexual activity: Not on file   Lifestyle    Physical activity:     Days per week: Not on file     Minutes per session: Not on file    Stress: Not on file   Relationships    Social connections:     Talks on phone: Not on file     Gets together: Not on file     Attends Shinto service: Not on file     Active member of club or organization: Not on file     Attends meetings of clubs or organizations: Not on file     Relationship status: Not on file    Intimate partner violence:     Fear of current or ex partner: Not on file     Emotionally abused: Not on file     Physically abused: Not on file     Forced sexual activity: Not on file   Other Topics Concern    Not on file   Social History Narrative    Not on file     Family History   Problem Relation Age of Onset    Hypertension Father     Heart Disease Father          Review of Systems   Constitutional: Negative for chills, fatigue and fever. HENT: Positive for congestion, rhinorrhea and sore throat. Negative for ear pain, postnasal drip and sinus pressure. Eyes: Negative for pain and redness. Respiratory: Negative for cough, shortness of breath and wheezing. Cardiovascular: Negative for chest pain, palpitations and leg swelling. Gastrointestinal: Negative for abdominal pain, diarrhea, nausea and vomiting. Endocrine: Negative for cold intolerance, heat intolerance, polydipsia and polyuria.    Genitourinary: Negative for difficulty urinating. Musculoskeletal: Negative for arthralgias. Skin: Negative for rash. Neurological: Positive for tremors (With hypoglycemia). Negative for dizziness and headaches. Peripheral neuropathy   Hematological: Negative for adenopathy. Psychiatric/Behavioral: Negative for agitation. Objective:   Physical Exam  Constitutional:       Appearance: He is well-developed. HENT:      Head: Normocephalic and atraumatic. Eyes:      Conjunctiva/sclera: Conjunctivae normal.   Neck:      Musculoskeletal: Normal range of motion and neck supple. Thyroid: No thyromegaly. Cardiovascular:      Rate and Rhythm: Normal rate and regular rhythm. Heart sounds: Normal heart sounds. No murmur. Pulmonary:      Effort: Pulmonary effort is normal. No respiratory distress. Breath sounds: Normal breath sounds. Abdominal:      General: Bowel sounds are normal. There is no distension. Palpations: Abdomen is soft. Tenderness: There is no tenderness. Musculoskeletal: Normal range of motion. General: No tenderness. Comments: 2+ DP\PT pulses bilaterally, sensation intact bilaterally. No wounds lesions or ulcerations of the left foot. Right toe with a well-healing blister at the tip, 0.5 cm in diameter. No pain, erythema, edema, or discharge   Lymphadenopathy:      Cervical: No cervical adenopathy. Skin:     General: Skin is warm and dry. Neurological:      Mental Status: He is alert and oriented to person, place, and time.

## 2020-02-07 RX ORDER — PIOGLITAZONEHYDROCHLORIDE 15 MG/1
TABLET ORAL
Qty: 30 TABLET | Refills: 3 | Status: SHIPPED | OUTPATIENT
Start: 2020-02-07 | End: 2020-07-10

## 2020-03-24 RX ORDER — DULAGLUTIDE 1.5 MG/.5ML
INJECTION, SOLUTION SUBCUTANEOUS
Qty: 2 ML | Refills: 3 | Status: SHIPPED | OUTPATIENT
Start: 2020-03-24 | End: 2020-09-24 | Stop reason: SDUPTHER

## 2020-03-24 RX ORDER — LANCETS 28 GAUGE
EACH MISCELLANEOUS
Qty: 100 EACH | Refills: 6 | Status: SHIPPED | OUTPATIENT
Start: 2020-03-24 | End: 2020-09-24 | Stop reason: SDUPTHER

## 2020-03-24 RX ORDER — BLOOD-GLUCOSE METER
KIT MISCELLANEOUS
Qty: 100 STRIP | Refills: 6 | Status: SHIPPED | OUTPATIENT
Start: 2020-03-24 | End: 2021-03-19 | Stop reason: SDUPTHER

## 2020-03-25 RX ORDER — MONTELUKAST SODIUM 10 MG/1
TABLET ORAL
Qty: 30 TABLET | Refills: 3 | Status: SHIPPED | OUTPATIENT
Start: 2020-03-25 | End: 2021-09-17

## 2020-05-01 RX ORDER — ERTUGLIFLOZIN 15 MG/1
TABLET, FILM COATED ORAL
Qty: 30 TABLET | Refills: 3 | Status: SHIPPED | OUTPATIENT
Start: 2020-05-01 | End: 2020-09-24 | Stop reason: ALTCHOICE

## 2020-05-01 RX ORDER — OMEGA-3-ACID ETHYL ESTERS 1 G/1
CAPSULE, LIQUID FILLED ORAL
Qty: 120 CAPSULE | Refills: 3 | Status: SHIPPED | OUTPATIENT
Start: 2020-05-01 | End: 2020-10-16

## 2020-07-10 RX ORDER — PIOGLITAZONEHYDROCHLORIDE 15 MG/1
TABLET ORAL
Qty: 30 TABLET | Refills: 3 | Status: SHIPPED | OUTPATIENT
Start: 2020-07-10 | End: 2020-09-24 | Stop reason: SDUPTHER

## 2020-08-06 RX ORDER — ATORVASTATIN CALCIUM 10 MG/1
TABLET, FILM COATED ORAL
Qty: 90 TABLET | Refills: 0 | OUTPATIENT
Start: 2020-08-06

## 2020-08-06 RX ORDER — CARVEDILOL 6.25 MG/1
TABLET ORAL
Qty: 180 TABLET | Refills: 0 | OUTPATIENT
Start: 2020-08-06

## 2020-09-24 ENCOUNTER — OFFICE VISIT (OUTPATIENT)
Dept: ENDOCRINOLOGY | Age: 55
End: 2020-09-24
Payer: COMMERCIAL

## 2020-09-24 VITALS
DIASTOLIC BLOOD PRESSURE: 74 MMHG | HEIGHT: 67 IN | BODY MASS INDEX: 27.94 KG/M2 | HEART RATE: 96 BPM | SYSTOLIC BLOOD PRESSURE: 110 MMHG | WEIGHT: 178 LBS

## 2020-09-24 PROBLEM — G56.03 BILATERAL CARPAL TUNNEL SYNDROME: Status: ACTIVE | Noted: 2020-09-24

## 2020-09-24 LAB
CHP ED QC CHECK: NORMAL
GLUCOSE BLD-MCNC: 159 MG/DL
HBA1C MFR BLD: 8 %

## 2020-09-24 PROCEDURE — G8427 DOCREV CUR MEDS BY ELIG CLIN: HCPCS | Performed by: PHYSICIAN ASSISTANT

## 2020-09-24 PROCEDURE — 3017F COLORECTAL CA SCREEN DOC REV: CPT | Performed by: PHYSICIAN ASSISTANT

## 2020-09-24 PROCEDURE — 2022F DILAT RTA XM EVC RTNOPTHY: CPT | Performed by: PHYSICIAN ASSISTANT

## 2020-09-24 PROCEDURE — 83036 HEMOGLOBIN GLYCOSYLATED A1C: CPT | Performed by: PHYSICIAN ASSISTANT

## 2020-09-24 PROCEDURE — 3052F HG A1C>EQUAL 8.0%<EQUAL 9.0%: CPT | Performed by: PHYSICIAN ASSISTANT

## 2020-09-24 PROCEDURE — 99214 OFFICE O/P EST MOD 30 MIN: CPT | Performed by: PHYSICIAN ASSISTANT

## 2020-09-24 PROCEDURE — G8419 CALC BMI OUT NRM PARAM NOF/U: HCPCS | Performed by: PHYSICIAN ASSISTANT

## 2020-09-24 PROCEDURE — 1036F TOBACCO NON-USER: CPT | Performed by: PHYSICIAN ASSISTANT

## 2020-09-24 PROCEDURE — 82962 GLUCOSE BLOOD TEST: CPT | Performed by: PHYSICIAN ASSISTANT

## 2020-09-24 RX ORDER — FENOFIBRATE 145 MG/1
145 TABLET, COATED ORAL DAILY
Qty: 30 TABLET | Refills: 3 | Status: SHIPPED | OUTPATIENT
Start: 2020-09-24 | End: 2020-12-22 | Stop reason: SDUPTHER

## 2020-09-24 RX ORDER — EMPAGLIFLOZIN 10 MG/1
10 TABLET, FILM COATED ORAL DAILY
Qty: 30 TABLET | Refills: 3 | Status: SHIPPED | OUTPATIENT
Start: 2020-09-24 | End: 2020-12-22 | Stop reason: SDUPTHER

## 2020-09-24 RX ORDER — LANCETS 28 GAUGE
1 EACH MISCELLANEOUS 3 TIMES DAILY
Qty: 100 EACH | Refills: 5 | Status: SHIPPED | OUTPATIENT
Start: 2020-09-24 | End: 2022-03-14 | Stop reason: ALTCHOICE

## 2020-09-24 RX ORDER — DULAGLUTIDE 1.5 MG/.5ML
1.5 INJECTION, SOLUTION SUBCUTANEOUS WEEKLY
Qty: 4 PEN | Refills: 3 | Status: SHIPPED | OUTPATIENT
Start: 2020-09-24 | End: 2020-12-22 | Stop reason: SDUPTHER

## 2020-09-24 RX ORDER — PIOGLITAZONEHYDROCHLORIDE 15 MG/1
15 TABLET ORAL DAILY
Qty: 30 TABLET | Refills: 3 | Status: SHIPPED | OUTPATIENT
Start: 2020-09-24 | End: 2020-12-22 | Stop reason: SDUPTHER

## 2020-09-24 ASSESSMENT — ENCOUNTER SYMPTOMS
EYE REDNESS: 0
EYE PAIN: 0
VOMITING: 0
RHINORRHEA: 1
DIARRHEA: 0
NAUSEA: 0
SORE THROAT: 1
WHEEZING: 0
ABDOMINAL PAIN: 0
SINUS PRESSURE: 0
COUGH: 0
SHORTNESS OF BREATH: 0

## 2020-09-24 NOTE — PROGRESS NOTES
Assessment:       Diagnosis Orders   1. Uncontrolled type 2 diabetes mellitus with complication, with long-term current use of insulin (HCC)  POCT Glucose    POCT glycosylated hemoglobin (Hb A1C)    Comprehensive Metabolic Panel    Microalbumin / Creatinine Urine Ratio    pioglitazone (ACTOS) 15 MG tablet    Dulaglutide (TRULICITY) 1.5 QL/0.1LE SOPN    CBC    Lipid Panel    Comprehensive Metabolic Panel    Hemoglobin A1C   2. Bilateral carpal tunnel syndrome  [facility] Roel Aleman CNP, Neurology, [place]   Average blood glucose 100-150 without significant hyper or hypoglycemic events  PLAN:     1. Insulin only if glucose > 150 (only needed twice in last 2 weeks)   2. Continue Actos 15 mg by mouth daily  3. Continue Jardiance 10 mg daily   4. Continue Trulicity 1.5 mg weekly  5. Continue Metformon 500 mg twice a day    6. Monitor glycemic control closely, worsening glycemic control   7.  Sildenafil 100 mg p.o. daily as needed  Orders Placed This Encounter   Procedures    Comprehensive Metabolic Panel     Standing Status:   Future     Standing Expiration Date:   9/24/2021    Microalbumin / Creatinine Urine Ratio     Standing Status:   Future     Standing Expiration Date:   9/24/2021    CBC     Standing Status:   Future     Standing Expiration Date:   9/24/2021    Lipid Panel     Standing Status:   Future     Standing Expiration Date:   9/24/2021     Order Specific Question:   Is Patient Fasting?/# of Hours     Answer:   8    Comprehensive Metabolic Panel     Standing Status:   Future     Standing Expiration Date:   9/24/2021    Hemoglobin A1C     Standing Status:   Future     Standing Expiration Date:   9/24/2021   [facility] Roel Aleman CNP, Neurology, [place]     Referral Priority:   Routine     Referral Type:   Eval and Treat     Referral Reason:   Specialty Services Required     Referred to Provider:   MIKE Liao CNP     Requested Specialty:   Nurse Practitioner     Number of Visits Requested: 1    POCT Glucose    POCT glycosylated hemoglobin (Hb A1C)     Orders Placed This Encounter   Medications    pioglitazone (ACTOS) 15 MG tablet     Sig: Take 1 tablet by mouth daily     Dispense:  30 tablet     Refill:  3    metFORMIN (GLUCOPHAGE) 500 MG tablet     Sig: Take 1 tablet by mouth 2 times daily (with meals)     Dispense:  60 tablet     Refill:  3    Dulaglutide (TRULICITY) 1.5 LE/3.4YY SOPN     Sig: Inject 1.5 mg into the skin once a week     Dispense:  4 pen     Refill:  3    empagliflozin (JARDIANCE) 10 MG tablet     Sig: Take 1 tablet by mouth daily     Dispense:  30 tablet     Refill:  3    fenofibrate (TRICOR) 145 MG tablet     Sig: Take 1 tablet by mouth daily     Dispense:  30 tablet     Refill:  3    FreeStyle Lancets MISC     Si each by Does not apply route 3 times daily     Dispense:  100 each     Refill:  5       Return in about 3 months (around 2020) for Diabetes. Subjective:     Chief Complaint   Patient presents with    Diabetes     Vitals:    20 1435   BP: 110/74   Site: Left Upper Arm   Position: Sitting   Cuff Size: Large Adult   Pulse: 96   Weight: 178 lb (80.7 kg)   Height: 5' 7\" (1.702 m)     Wt Readings from Last 3 Encounters:   20 178 lb (80.7 kg)   20 176 lb (79.8 kg)   19 167 lb (75.8 kg)     BP Readings from Last 3 Encounters:   20 110/74   20 136/70   19 110/66             Diabetes   He presents for his follow-up diabetic visit. He has type 2 diabetes mellitus. The initial diagnosis of diabetes was made 5 years ago. His disease course has been stable. Hypoglycemia symptoms include sweats and tremors (With hypoglycemia). Pertinent negatives for hypoglycemia include no dizziness or headaches. There are no diabetic associated symptoms. Pertinent negatives for diabetes include no chest pain, no fatigue, no polydipsia and no polyuria. There are no hypoglycemic complications. There are no diabetic complications.  Risk factors for coronary artery disease include dyslipidemia, male sex and hypertension. Current diabetic treatment includes insulin injections and oral agent (dual therapy). He is compliant with treatment all of the time. He is following a generally healthy diet. Meal planning includes avoidance of concentrated sweets and carbohydrate counting. He rarely participates in exercise. His home blood glucose trend is decreasing steadily. His overall blood glucose range is 140-180 mg/dl. An ACE inhibitor/angiotensin II receptor blocker is not being taken. He does not see a podiatrist.Eye exam is current.        Past Medical History:   Diagnosis Date    Acid reflux disease with ulcer 03/19/2018    Bilateral carpal tunnel syndrome 9/24/2020    Diabetes mellitus ([facility] [address]     BUCKLEY (dyspnea on exertion) 2/2/2018    Hypertension     Mixed hyperlipidemia 2/2/2018       Current Outpatient Medications:     pioglitazone (ACTOS) 15 MG tablet, Take 1 tablet by mouth daily, Disp: 30 tablet, Rfl: 3    metFORMIN (GLUCOPHAGE) 500 MG tablet, Take 1 tablet by mouth 2 times daily (with meals), Disp: 60 tablet, Rfl: 3    Dulaglutide (TRULICITY) 1.5 IQ/1.8NP SOPN, Inject 1.5 mg into the skin once a week, Disp: 4 pen, Rfl: 3    empagliflozin (JARDIANCE) 10 MG tablet, Take 1 tablet by mouth daily, Disp: 30 tablet, Rfl: 3    fenofibrate (TRICOR) 145 MG tablet, Take 1 tablet by mouth daily, Disp: 30 tablet, Rfl: 3    FreeStyle Lancets MISC, 1 each by Does not apply route 3 times daily, Disp: 100 each, Rfl: 5    insulin NPH (HUMULIN N) 100 UNIT/ML injection vial, inject 45 units at bedtime, Disp: 20 mL, Rfl: 3    omega-3 acid ethyl esters (LOVAZA) 1 g capsule, take 2 capsules by mouth twice a day, Disp: 120 capsule, Rfl: 3    atorvastatin (LIPITOR) 10 MG tablet, take 1 tablet by mouth once daily, Disp: 90 tablet, Rfl: 0    lisinopril (PRINIVIL;ZESTRIL) 5 MG tablet, take 1 tablet by mouth once daily, Disp: 180 tablet, Rfl: 0    carvedilol (COREG) 6.25 MG tablet, take 1 tablet by mouth twice a day with food, Disp: 180 tablet, Rfl: 0    montelukast (SINGULAIR) 10 MG tablet, take 1 tablet by mouth once daily, Disp: 30 tablet, Rfl: 3    blood glucose test strips (FREESTYLE LITE) strip, TEST three times a day, Disp: 100 strip, Rfl: 6    insulin regular (HUMULIN R) 100 UNIT/ML injection, INJECT 12-15 UNITS AT [facility] MEAL, Disp: 30 mL, Rfl: 3    sildenafil (VIAGRA) 100 MG tablet, Take 1 tablet by mouth as needed for Erectile Dysfunction, Disp: 30 tablet, Rfl: 3    BD INSULIN SYRINGE U/F 31G X 5/16\" 0.5 ML MISC, use four times a day, Disp: 200 each, Rfl: 3    Fluticasone Furoate-Vilanterol (BREO ELLIPTA) 200-25 MCG/INH AEPB, inhalation 1 puff by mouth once daily, Disp: 1 each, Rfl: 1    omeprazole (PRILOSEC) 40 MG delayed release capsule, Take 1 capsule by mouth daily, Disp: 30 capsule, Rfl: 11    aspirin 81 MG tablet, Take 81 mg by mouth daily, Disp: , Rfl:     albuterol sulfate HFA (PROAIR HFA) 108 (90 Base) MCG/ACT inhaler, Inhale 2 puffs into the lungs every 6 hours as needed for Wheezing, Disp: 1 Inhaler, Rfl: 3    Insulin Syringe-Needle U-100 31G X 5/16\" 1 ML MISC, 1 each by Does not apply route 4 times daily, Disp: 150 each, Rfl: 3  Lab Results   Component Value Date     12/16/2019    K 4.4 12/16/2019     12/16/2019    CO2 23 12/16/2019    BUN 28 (H) 12/16/2019    CREATININE 1.26 (H) 12/16/2019    GLUCOSE 159 09/24/2020    CALCIUM 8.4 (L) 12/16/2019    PROT 7.4 12/16/2019    LABALBU 4.4 12/16/2019    BILITOT 0.4 12/16/2019    ALKPHOS 116 (H) 12/16/2019    AST 23 12/16/2019    ALT 19 12/16/2019    LABGLOM 59.9 (L) 12/16/2019    GFRAA >60.0 12/16/2019    GLOB 3.0 12/16/2019     Lab Results   Component Value Date    WBC 8.0 01/26/2018    HGB 13.6 (L) 03/19/2018    HCT 39.3 (L) 03/19/2018    MCV 86.6 01/26/2018     01/26/2018     Lab Results   Component Value Date    LABA1C 8.0 09/24/2020    LABA1C 6.5 (H) 12/16/2019    LABA1C 7.0 09/26/2019     Lab Results   Component Value Date    CHOL 145 12/16/2019    CHOL 187 03/25/2019    CHOL 190 01/26/2018      Lab Results   Component Value Date    TRIG 218 (H) 12/16/2019    TRIG 432 (H) 03/25/2019    TRIG 317 (H) 01/26/2018     Lab Results   Component Value Date    HDL 52 12/16/2019    HDL 31 (L) 03/25/2019    HDL 34 (L) 01/26/2018     Lab Results   Component Value Date    LDLCALC 49 12/16/2019    [address] see below 03/25/2019    LDLCALC 93 01/26/2018     No results found for: LABVLDL, VLDL  No results found for: CHOLHDLRATIO  No results found for: TESTOSTERONE  No results found for: TSH, S0YFXJO, L0RNLIR, THYROIDAB        No Known Allergies    Past Surgical History:   Procedure Laterality Date    CYSTOSCOPY Right 12/13/2017    CYSTOSCOPY RIGHT  RETROGRADE PYELOGRAM AND REMOVAL OF RIGHT DJ STENT (LABS DONE 11/27) performed by Kyle Monk MD at [address] [place] [address] / [facility] Right 11/8/2017    CYSTOSCOPY RIGHT DOUBLE J  STENT INSERTION performed by Kyle Monk MD at [address] [place] [address] [place] FLX DX W/COLLJ Soisela 1977 PFRMD N/A 3/19/2018    COLONOSCOPY performed by Philipp Lundborg, MD at [facility] ESOPHAGOGASTRODUODENOSCOPY TRANSORAL DIAGNOSTIC N/A 3/19/2018    EGD ESOPHAGOGASTRODUODENOSCOPY performed by Philipp Lundborg, MD at [address] [place] [address] Marital status:      Spouse name: Not on file    Number of children: Not on file    Years of education: Not on file    Highest education level: Not on file   Occupational History    Not on file   Social Needs    Financial resource strain: Not on file    Food insecurity     Worry: Not on file     Inability: Not on file    Transportation needs     Medical: Not on file     Non-medical: Not on file   Tobacco Use    Smoking status: Never Smoker    Smokeless tobacco: Never Used   Substance and Sexual Activity    Conjunctiva/sclera: Conjunctivae normal.   Neck:      Musculoskeletal: Normal range of motion and neck supple. Thyroid: No thyromegaly. Cardiovascular:      Rate and Rhythm: Normal rate and regular rhythm. Heart sounds: Normal heart sounds. No murmur. Pulmonary:      Effort: Pulmonary effort is normal. No respiratory distress. Breath sounds: Normal breath sounds. Abdominal:      General: Bowel sounds are normal. There is no distension. Palpations: Abdomen is soft. Tenderness: There is no abdominal tenderness. Musculoskeletal: Normal range of motion. General: No tenderness. Comments: 2+ DP\PT pulses bilaterally, sensation intact bilaterally. No wounds lesions or ulcerations of the left foot. Right toe with a well-healing blister at the tip, 0.5 cm in diameter. No pain, erythema, edema, or discharge   Lymphadenopathy:      Cervical: No cervical adenopathy. Skin:     General: Skin is warm and dry. Neurological:      Mental Status: He is alert and oriented to person, place, and time.

## 2020-10-16 RX ORDER — OMEGA-3-ACID ETHYL ESTERS 1 G/1
CAPSULE, LIQUID FILLED ORAL
Qty: 120 CAPSULE | Refills: 3 | Status: SHIPPED | OUTPATIENT
Start: 2020-10-16 | End: 2020-12-22 | Stop reason: SDUPTHER

## 2020-10-21 ENCOUNTER — OFFICE VISIT (OUTPATIENT)
Dept: FAMILY MEDICINE CLINIC | Age: 55
End: 2020-10-21
Payer: COMMERCIAL

## 2020-10-21 VITALS
RESPIRATION RATE: 16 BRPM | DIASTOLIC BLOOD PRESSURE: 104 MMHG | SYSTOLIC BLOOD PRESSURE: 158 MMHG | HEART RATE: 96 BPM | HEIGHT: 67 IN | WEIGHT: 178 LBS | OXYGEN SATURATION: 99 % | BODY MASS INDEX: 27.94 KG/M2 | TEMPERATURE: 97.2 F

## 2020-10-21 DIAGNOSIS — R05.9 COUGH: ICD-10-CM

## 2020-10-21 DIAGNOSIS — J02.9 SORE THROAT: ICD-10-CM

## 2020-10-21 LAB
ALBUMIN SERPL-MCNC: 4.4 G/DL (ref 3.5–4.6)
ALP BLD-CCNC: 80 U/L (ref 35–104)
ALT SERPL-CCNC: 24 U/L (ref 0–41)
ANION GAP SERPL CALCULATED.3IONS-SCNC: 9 MEQ/L (ref 9–15)
AST SERPL-CCNC: 34 U/L (ref 0–40)
BILIRUB SERPL-MCNC: 0.5 MG/DL (ref 0.2–0.7)
BUN BLDV-MCNC: 16 MG/DL (ref 6–20)
CALCIUM SERPL-MCNC: 9.2 MG/DL (ref 8.5–9.9)
CHLORIDE BLD-SCNC: 105 MEQ/L (ref 95–107)
CHOLESTEROL, TOTAL: 193 MG/DL (ref 0–199)
CO2: 25 MEQ/L (ref 20–31)
CREAT SERPL-MCNC: 1.05 MG/DL (ref 0.7–1.2)
CREATININE URINE: 124.7 MG/DL
GFR AFRICAN AMERICAN: >60
GFR NON-AFRICAN AMERICAN: >60
GLOBULIN: 2.7 G/DL (ref 2.3–3.5)
GLUCOSE BLD-MCNC: 135 MG/DL (ref 70–99)
HCT VFR BLD CALC: 45.7 % (ref 42–52)
HDLC SERPL-MCNC: 50 MG/DL (ref 40–59)
HEMOGLOBIN: 15.3 G/DL (ref 14–18)
LDL CHOLESTEROL CALCULATED: 91 MG/DL (ref 0–129)
MCH RBC QN AUTO: 30.3 PG (ref 27–31.3)
MCHC RBC AUTO-ENTMCNC: 33.5 % (ref 33–37)
MCV RBC AUTO: 90.3 FL (ref 80–100)
MICROALBUMIN UR-MCNC: 12.3 MG/DL
MICROALBUMIN/CREAT UR-RTO: 98.6 MG/G (ref 0–30)
PDW BLD-RTO: 13.4 % (ref 11.5–14.5)
PLATELET # BLD: 214 K/UL (ref 130–400)
POTASSIUM SERPL-SCNC: 4.9 MEQ/L (ref 3.4–4.9)
RBC # BLD: 5.06 M/UL (ref 4.7–6.1)
SODIUM BLD-SCNC: 139 MEQ/L (ref 135–144)
TOTAL PROTEIN: 7.1 G/DL (ref 6.3–8)
TRIGL SERPL-MCNC: 261 MG/DL (ref 0–150)
WBC # BLD: 7.5 K/UL (ref 4.8–10.8)

## 2020-10-21 PROCEDURE — 87804 INFLUENZA ASSAY W/OPTIC: CPT | Performed by: PHYSICIAN ASSISTANT

## 2020-10-21 PROCEDURE — 99213 OFFICE O/P EST LOW 20 MIN: CPT | Performed by: PHYSICIAN ASSISTANT

## 2020-10-21 RX ORDER — AMOXICILLIN AND CLAVULANATE POTASSIUM 875; 125 MG/1; MG/1
1 TABLET, FILM COATED ORAL 2 TIMES DAILY
Qty: 14 TABLET | Refills: 0 | Status: SHIPPED | OUTPATIENT
Start: 2020-10-21 | End: 2020-10-28

## 2020-10-21 RX ORDER — BENZONATATE 100 MG/1
100 CAPSULE ORAL 2 TIMES DAILY PRN
Qty: 20 CAPSULE | Refills: 0 | Status: SHIPPED | OUTPATIENT
Start: 2020-10-21 | End: 2020-10-28

## 2020-10-21 ASSESSMENT — PATIENT HEALTH QUESTIONNAIRE - PHQ9
SUM OF ALL RESPONSES TO PHQ9 QUESTIONS 1 & 2: 0
2. FEELING DOWN, DEPRESSED OR HOPELESS: NOT AT ALL
1. LITTLE INTEREST OR PLEASURE IN DOING THINGS: NOT AT ALL
DEPRESSION UNABLE TO ASSESS: YES

## 2020-10-21 ASSESSMENT — ENCOUNTER SYMPTOMS
TROUBLE SWALLOWING: 0
ABDOMINAL PAIN: 0
COUGH: 0
BACK PAIN: 0
SINUS PRESSURE: 1
SHORTNESS OF BREATH: 0
SINUS PAIN: 0
RHINORRHEA: 1
DIARRHEA: 0
EYE PAIN: 0
VOMITING: 0
CHEST TIGHTNESS: 0
EYE ITCHING: 0
EYE DISCHARGE: 0

## 2020-10-21 NOTE — PATIENT INSTRUCTIONS
Patient Education        Sore Throat: Care Instructions  Your Care Instructions     Infection by bacteria or a virus causes most sore throats. Cigarette smoke, dry air, air pollution, allergies, and yelling can also cause a sore throat. Sore throats can be painful and annoying. Fortunately, most sore throats go away on their own. If you have a bacterial infection, your doctor may prescribe antibiotics. Follow-up care is a key part of your treatment and safety. Be sure to make and go to all appointments, and call your doctor if you are having problems. It's also a good idea to know your test results and keep a list of the medicines you take. How can you care for yourself at home? · If your doctor prescribed antibiotics, take them as directed. Do not stop taking them just because you feel better. You need to take the full course of antibiotics. · Gargle with warm salt water once an hour to help reduce swelling and relieve discomfort. Use 1 teaspoon of salt mixed in 1 cup of warm water. · Take an over-the-counter pain medicine, such as acetaminophen (Tylenol), ibuprofen (Advil, Motrin), or naproxen (Aleve). Read and follow all instructions on the label. · Be careful when taking over-the-counter cold or flu medicines and Tylenol at the same time. Many of these medicines have acetaminophen, which is Tylenol. Read the labels to make sure that you are not taking more than the recommended dose. Too much acetaminophen (Tylenol) can be harmful. · Drink plenty of fluids. Fluids may help soothe an irritated throat. Hot fluids, such as tea or soup, may help decrease throat pain. · Use over-the-counter throat lozenges to soothe pain. Regular cough drops or hard candy may also help. These should not be given to young children because of the risk of choking. · Do not smoke or allow others to smoke around you. If you need help quitting, talk to your doctor about stop-smoking programs and medicines.  These can increase your chances of quitting for good. · Use a vaporizer or humidifier to add moisture to your bedroom. Follow the directions for cleaning the machine. When should you call for help? Call your doctor now or seek immediate medical care if:    · You have new or worse trouble swallowing.     · Your sore throat gets much worse on one side. Watch closely for changes in your health, and be sure to contact your doctor if you do not get better as expected. Where can you learn more? Go to https://[email or web address] org and sign in to your [employer] account. Enter A303 in the [employer] box to learn more about \"Sore Throat: Care Instructions. \"     If you do not have an account, please click on the \"Sign Up Now\" link. Current as of: April 15, 2020               Content Version: 12.6  © 1082-2956 [employer]. Care instructions adapted under license by [facility] ([facility]). If you have questions about a medical condition or this instruction, always ask your healthcare professional. [place] [address] any warranty or liability for your use of this information.

## 2020-10-21 NOTE — LETTER
[facility]  [id number] [place] [address] [facility] [address]  Phone: [phone]  Fax: [place], [place]        October 21, 2020     Patient: Sofi Gaines   YOB: 1965   Date of Visit: 10/21/2020       To Whom it May Concern:    Oma Everett was seen in my clinic on 10/21/2020. He may return to work on 10/26/20 with no restrictions. If you have any questions or concerns, please don't hesitate to call.     Sincerely,         KATHY Walsh

## 2020-10-21 NOTE — PROGRESS NOTES
Subjective:      Patient ID: Evre Ponce is a 49 y.o. male who presents today for:  No chief complaint on file. HPI  49 male who complains of headache, sinus, pressure, nonproductive cough, runny nose, and chills for the past three. The patient and his wife were outside this week for a football game. No known exposure to COVID-19.  Take tylenol for symptoms relief    Past Medical History:   Diagnosis Date    Acid reflux disease with ulcer 03/19/2018    Bilateral carpal tunnel syndrome 9/24/2020    Diabetes mellitus ([address]     BUCKLEY (dyspnea on exertion) 2/2/2018    Hypertension     Mixed hyperlipidemia 2/2/2018     Past Surgical History:   Procedure Laterality Date    CYSTOSCOPY Right 12/13/2017    CYSTOSCOPY RIGHT  RETROGRADE PYELOGRAM AND REMOVAL OF RIGHT DJ STENT (LABS DONE 11/27) performed by Irais Nichols MD at [facility] / [facility]te Right 11/8/2017    CYSTOSCOPY RIGHT DOUBLE J  STENT INSERTION performed by Irais Nichols MD at [facility] FLX DX W/COLLJOANA Luevano 1977 PFRMD N/A 3/19/2018    COL[facility] ESOPHAGOGASTRODUODENOSCOPY TRANSORAL DIAGNOSTIC N/A 3/19/2018    EGD ESOPHAGOGASTRODUODENOSCOPY performed by Catie Chahal MD at [address] [place] [address] Marital status:      Spouse name: Not on file    Number of children: Not on file    Years of education: Not on file    Highest education level: Not on file   Occupational History    Not on file   Social Needs    Financial resource strain: Not on file    Food insecurity     Worry: Not on file     Inability: Not on file    Transportation needs     Medical: Not on file     Non-medical: Not on file   Tobacco Use    Smoking status: Never Smoker    Smokeless tobacco: Never Used   Substance and Sexual Activity    Alcohol use: Yes     Comment: occasional  Drug use: No    Sexual activity: Not on file   Lifestyle    Physical activity     Days per week: Not on file     Minutes per session: Not on file    Stress: Not on file   Relationships    Social connections     Talks on phone: Not on file     Gets together: Not on file     Attends Spiritism service: Not on file     Active member of club or organization: Not on file     Attends meetings of clubs or organizations: Not on file     Relationship status: Not on file    Intimate partner violence     Fear of current or ex partner: Not on file     Emotionally abused: Not on file     Physically abused: Not on file     Forced sexual activity: Not on file   Other Topics Concern    Not on file   Social History Narrative    Not on file     Family History   Problem Relation Age of Onset    Hypertension Father     Heart Disease Father      No Known Allergies  Current Outpatient Medications   Medication Sig Dispense Refill    amoxicillin-clavulanate (AUGMENTIN) 875-125 MG per tablet Take 1 tablet by mouth 2 times daily for 7 days 14 tablet 0    benzonatate (TESSALON) 100 MG capsule Take 1 capsule by mouth 2 times daily as needed for Cough 20 capsule 0    omega-3 acid ethyl esters (LOVAZA) 1 g capsule take 2 capsules by mouth twice a day 120 capsule 3    pioglitazone (ACTOS) 15 MG tablet Take 1 tablet by mouth daily 30 tablet 3    metFORMIN (GLUCOPHAGE) 500 MG tablet Take 1 tablet by mouth 2 times daily (with meals) 60 tablet 3    Dulaglutide (TRULICITY) 1.5 GW/6.1NO SOPN Inject 1.5 mg into the skin once a week 4 pen 3    empagliflozin (JARDIANCE) 10 MG tablet Take 1 tablet by mouth daily 30 tablet 3    fenofibrate (TRICOR) 145 MG tablet Take 1 tablet by mouth daily 30 tablet 3    FreeStyle Lancets MISC 1 each by Does not apply route 3 times daily 100 each 5    insulin NPH (HUMULIN N) 100 UNIT/ML injection vial inject 45 units at bedtime 20 mL 3    atorvastatin (LIPITOR) 10 MG tablet take 1 tablet by mouth frequency. Musculoskeletal: Negative for back pain and myalgias. Skin: Negative for pallor and rash. Neurological: Negative for syncope, weakness and headaches. Hematological: Does not bruise/bleed easily. Psychiatric/Behavioral: Negative for agitation, behavioral problems and confusion. All other systems reviewed and are negative. Objective:   BP (!) 158/104 (Site: Left Upper Arm, Position: Sitting, Cuff Size: Medium Adult)   Pulse 96   Temp 97.2 °F (36.2 °C) (Tympanic)   Resp 16   Ht 5' 7\" (1.702 m)   Wt 178 lb (80.7 kg)   SpO2 99%   BMI 27.88 kg/m²     Physical Exam  Vitals signs and nursing note reviewed. Constitutional:       General: He is awake. He is not in acute distress. Appearance: Normal appearance. He is well-developed and normal weight. He is not ill-appearing, toxic-appearing or diaphoretic. Comments: No photophobia. No phonophobia. HENT:      Head: Normocephalic and atraumatic. No Shannon's sign. Right Ear: Tympanic membrane and external ear normal.      Left Ear: Tympanic membrane and external ear normal.      Nose: Nose normal. No congestion or rhinorrhea. Mouth/Throat:      Mouth: Mucous membranes are moist.      Pharynx: Oropharynx is clear. No oropharyngeal exudate or posterior oropharyngeal erythema. Eyes:      General: No scleral icterus. Right eye: No foreign body or discharge. Left eye: No discharge. Extraocular Movements: Extraocular movements intact. Conjunctiva/sclera: Conjunctivae normal.      Left eye: No exudate. Pupils: Pupils are equal, round, and reactive to light. Neck:      Musculoskeletal: Normal range of motion and neck supple. No neck rigidity. Vascular: No JVD. Trachea: No tracheal deviation. Comments: No meningismus. Cardiovascular:      Rate and Rhythm: Normal rate and regular rhythm. Heart sounds: Normal heart sounds. Heart sounds not distant. No murmur. No friction rub.  No gallop. Pulmonary:      Effort: Pulmonary effort is normal. No respiratory distress. Breath sounds: Normal breath sounds. No stridor. No wheezing, rhonchi or rales. Chest:      Chest wall: No tenderness. Abdominal:      General: Abdomen is flat. Bowel sounds are normal. There is no distension. Palpations: Abdomen is soft. There is no mass. Tenderness: There is no abdominal tenderness. There is no right CVA tenderness, left CVA tenderness, guarding or rebound. Hernia: No hernia is present. Musculoskeletal: Normal range of motion. General: No swelling, tenderness, deformity or signs of injury. Lymphadenopathy:      Head:      Right side of head: No submental adenopathy. Left side of head: No submental adenopathy. Skin:     General: Skin is warm and dry. Capillary Refill: Capillary refill takes less than 2 seconds. Coloration: Skin is not jaundiced or pale. Findings: No bruising, erythema, lesion or rash. Neurological:      General: No focal deficit present. Mental Status: He is alert and oriented to person, place, and time. Mental status is at baseline. Cranial Nerves: No cranial nerve deficit. Sensory: No sensory deficit. Motor: No weakness. Coordination: Coordination normal.      Deep Tendon Reflexes: Reflexes are normal and symmetric. Psychiatric:         Mood and Affect: Mood normal.         Behavior: Behavior normal. Behavior is cooperative. Thought Content: Thought content normal.         Judgment: Judgment normal.         Assessment:       Diagnosis Orders   1. Acute maxillary sinusitis, recurrence not specified  amoxicillin-clavulanate (AUGMENTIN) 875-125 MG per tablet    benzonatate (TESSALON) 100 MG capsule    POCT Influenza A/B   2. Sore throat  amoxicillin-clavulanate (AUGMENTIN) 875-125 MG per tablet    POCT Influenza A/B   3.  Cough  amoxicillin-clavulanate (AUGMENTIN) 875-125 MG per tablet    benzonatate (TESSALON) 100 MG capsule    POCT Influenza A/B     No results found for this visit on 10/21/20. Plan:     Assessment & Plan   Diagnoses and all orders for this visit:    Acute maxillary sinusitis, recurrence not specified  -     amoxicillin-clavulanate (AUGMENTIN) 875-125 MG per tablet; Take 1 tablet by mouth 2 times daily for 7 days  -     benzonatate (TESSALON) 100 MG capsule; Take 1 capsule by mouth 2 times daily as needed for Cough  -     POCT Influenza A/B    Sore throat  -     amoxicillin-clavulanate (AUGMENTIN) 875-125 MG per tablet; Take 1 tablet by mouth 2 times daily for 7 days  -     POCT Influenza A/B    Cough  -     amoxicillin-clavulanate (AUGMENTIN) 875-125 MG per tablet; Take 1 tablet by mouth 2 times daily for 7 days  -     benzonatate (TESSALON) 100 MG capsule; Take 1 capsule by mouth 2 times daily as needed for Cough  -     POCT Influenza A/B      Orders Placed This Encounter   Procedures    POCT Influenza A/B     Orders Placed This Encounter   Medications    amoxicillin-clavulanate (AUGMENTIN) 875-125 MG per tablet     Sig: Take 1 tablet by mouth 2 times daily for 7 days     Dispense:  14 tablet     Refill:  0    benzonatate (TESSALON) 100 MG capsule     Sig: Take 1 capsule by mouth 2 times daily as needed for Cough     Dispense:  20 capsule     Refill:  0     There are no discontinued medications. Return if symptoms worsen or fail to improve. Reviewed with the patient/family: current clinical status & medications. Side effects of the medication prescribed today, as well as treatment plan/rationale and result expectations have been discussed with the patient/family who expresses understanding. Patient will be discharged home in stable condition. Follow up with PCP to evaluate treatment results or return if symptoms worsen or fail to improve. Discussed signs and symptoms which require immediate follow-up in ED/call to 911. Understanding verbalized.      I have reviewed the

## 2020-10-22 LAB
SARS-COV-2: NOT DETECTED
SOURCE: NORMAL

## 2020-10-27 LAB
INFLUENZA A ANTIBODY: NORMAL
INFLUENZA B ANTIBODY: NORMAL

## 2020-11-06 RX ORDER — LISINOPRIL 5 MG/1
TABLET ORAL
Qty: 30 TABLET | Refills: 0 | Status: SHIPPED | OUTPATIENT
Start: 2020-11-06 | End: 2021-09-17

## 2020-11-30 ENCOUNTER — OFFICE VISIT (OUTPATIENT)
Dept: NEUROLOGY | Age: 55
End: 2020-11-30
Payer: COMMERCIAL

## 2020-11-30 VITALS
DIASTOLIC BLOOD PRESSURE: 82 MMHG | HEART RATE: 79 BPM | BODY MASS INDEX: 27.46 KG/M2 | SYSTOLIC BLOOD PRESSURE: 120 MMHG | WEIGHT: 175.3 LBS

## 2020-11-30 DIAGNOSIS — R20.0 NUMBNESS: ICD-10-CM

## 2020-11-30 DIAGNOSIS — G63 POLYNEUROPATHY ASSOCIATED WITH UNDERLYING DISEASE (HCC): Primary | ICD-10-CM

## 2020-11-30 DIAGNOSIS — R20.2 PARESTHESIAS: ICD-10-CM

## 2020-11-30 PROCEDURE — 3017F COLORECTAL CA SCREEN DOC REV: CPT | Performed by: PSYCHIATRY & NEUROLOGY

## 2020-11-30 PROCEDURE — G8419 CALC BMI OUT NRM PARAM NOF/U: HCPCS | Performed by: PSYCHIATRY & NEUROLOGY

## 2020-11-30 PROCEDURE — 1036F TOBACCO NON-USER: CPT | Performed by: PSYCHIATRY & NEUROLOGY

## 2020-11-30 PROCEDURE — G8484 FLU IMMUNIZE NO ADMIN: HCPCS | Performed by: PSYCHIATRY & NEUROLOGY

## 2020-11-30 PROCEDURE — 99204 OFFICE O/P NEW MOD 45 MIN: CPT | Performed by: PSYCHIATRY & NEUROLOGY

## 2020-11-30 PROCEDURE — G8427 DOCREV CUR MEDS BY ELIG CLIN: HCPCS | Performed by: PSYCHIATRY & NEUROLOGY

## 2020-11-30 RX ORDER — GABAPENTIN 300 MG/1
CAPSULE ORAL
Qty: 60 CAPSULE | Refills: 1 | Status: SHIPPED | OUTPATIENT
Start: 2020-11-30 | End: 2021-03-20 | Stop reason: SDUPTHER

## 2020-11-30 NOTE — PROGRESS NOTES
Subjective:      Patient ID: Carmen Mccormick is a 48 y.o. male who presents today for:  Chief Complaint   Patient presents with    New Patient     pt statesthat he is having a lot of tingling in the wrist, hands, and feet. He states that it does not matter what type of shoes he is wearing it feels like someone is squeezing his feet. Its the cold, tingeling and pressure feeling in the feet. HPI 81-year-old right-hand gentleman is referred here for numbness in his hands and legs. Patient has known history of diabetes and reports numbness in his lower extremity with burning pain. Patient has a diagnosis of diabetes. Is not been tried on medication he has some loss of balance from the same he denies any falls injuries or trauma. He is not an EMG nerve conduction study  Patient denies any back pain denies any symptoms in his upper extremities.     Past Medical History:   Diagnosis Date    Acid reflux disease with ulcer 03/19/2018    Bilateral carpal tunnel syndrome 9/24/2020    Diabetes mellitus ([address]     BUCKLEY (dyspnea on exertion) 2/2/2018    Hypertension     Mixed hyperlipidemia 2/2/2018     Past Surgical History:   Procedure Laterality Date    CYSTOSCOPY Right 12/13/2017    CYSTOSCOPY RIGHT  RETROGRADE PYELOGRAM AND REMOVAL OF RIGHT DJ STENT (LABS DONE 11/27) performed by Aki Silva MD at [address] [place] [address] / [facility] / [facility] Right 11/8/2017    CYSTOSCOPY RIGHT DOUBLE J  STENT INSERTION performed by Aki Silva MD at [address] [place] [address] [place] FLX DX W/COLLJOANA Luevano 1977 PFRMD N/A 3/19/2018    COLONOSCOPY performed by Steph Doty MD at [facility] ESOPHAGOGASTRODUODENOSCOPY TRANSORAL DIAGNOSTIC N/A 3/19/2018    EGD ESOPHAGOGASTRODUODENOSCOPY performed by Steph Doty MD at [address] [place] [address] Marital status:      Spouse name: Not on file  Number of children: Not on file    Years of education: Not on file    Highest education level: Not on file   Occupational History    Not on file   Social Needs    Financial resource strain: Not on file    Food insecurity     Worry: Not on file     Inability: Not on file    Transportation needs     Medical: Not on file     Non-medical: Not on file   Tobacco Use    Smoking status: Never Smoker    Smokeless tobacco: Never Used   Substance and Sexual Activity    Alcohol use: Yes     Comment: occasional    Drug use: No    Sexual activity: Not on file   Lifestyle    Physical activity     Days per week: Not on file     Minutes per session: Not on file    Stress: Not on file   Relationships    Social connections     Talks on phone: Not on file     Gets together: Not on file     Attends Taoist service: Not on file     Active member of club or organization: Not on file     Attends meetings of clubs or organizations: Not on file     Relationship status: Not on file    Intimate partner violence     Fear of current or ex partner: Not on file     Emotionally abused: Not on file     Physically abused: Not on file     Forced sexual activity: Not on file   Other Topics Concern    Not on file   Social History Narrative    Not on file     Family History   Problem Relation Age of Onset    Hypertension Father     Heart Disease Father      No Known Allergies    Current Outpatient Medications   Medication Sig Dispense Refill    gabapentin (NEURONTIN) 300 MG capsule One qhs for 2 weeks, then  2  qhs 60 capsule 1    lisinopril (PRINIVIL;ZESTRIL) 5 MG tablet take 1 tablet by mouth once daily 30 tablet 0    FreeStyle Lancets MISC 1 each by Does not apply route 3 times daily 100 each 5    insulin NPH (HUMULIN N) 100 UNIT/ML injection vial inject 45 units at bedtime 20 mL 3    atorvastatin (LIPITOR) 10 MG tablet take 1 tablet by mouth once daily 90 tablet 0  carvedilol (COREG) 6.25 MG tablet take 1 tablet by mouth twice a day with food 180 tablet 0    montelukast (SINGULAIR) 10 MG tablet take 1 tablet by mouth once daily 30 tablet 3    blood glucose test strips (FREESTYLE LITE) strip TEST three times a day 100 strip 6    insulin regular (HUMULIN R) 100 UNIT/ML injection INJECT 12-15 UNITS AT [facility] MEAL 30 mL 3    BD INSULIN SYRINGE U/F 31G X 5/16\" 0.5 ML MISC use four times a day 200 each 3    Fluticasone Furoate-Vilanterol (BREO ELLIPTA) 200-25 MCG/INH AEPB inhalation 1 puff by mouth once daily 1 each 1    omeprazole (PRILOSEC) 40 MG delayed release capsule Take 1 capsule by mouth daily 30 capsule 11    aspirin 81 MG tablet Take 81 mg by mouth daily      albuterol sulfate HFA (PROAIR HFA) 108 (90 Base) MCG/ACT inhaler Inhale 2 puffs into the lungs every 6 hours as needed for Wheezing 1 Inhaler 3    Insulin Syringe-Needle U-100 31G X 5/16\" 1 ML MISC 1 each by Does not apply route 4 times daily 150 each 3    pioglitazone (ACTOS) 15 MG tablet Take 1 tablet by mouth daily 30 tablet 3    metFORMIN (GLUCOPHAGE) 500 MG tablet Take 1 tablet by mouth 2 times daily (with meals) 60 tablet 3    Dulaglutide (TRULICITY) 1.5 ID/1.5EU SOPN Inject 1.5 mg into the skin once a week 4 pen 3    empagliflozin (JARDIANCE) 10 MG tablet Take 1 tablet by mouth daily 30 tablet 3    fenofibrate (TRICOR) 145 MG tablet Take 1 tablet by mouth daily 30 tablet 3    omega-3 acid ethyl esters (LOVAZA) 1 g capsule take 2 capsules by mouth twice a day 120 capsule 3    sildenafil (VIAGRA) 100 MG tablet Take 1 tablet by mouth as needed for Erectile Dysfunction 30 tablet 3     No current facility-administered medications for this visit. Review of Systems   Constitutional: Negative for fever. HENT: Negative for ear pain, tinnitus and trouble swallowing. Eyes: Negative for photophobia and visual disturbance. Respiratory: Negative for choking and shortness of breath. Cardiovascular: Negative for chest pain and palpitations. Gastrointestinal: Negative for nausea and vomiting. Musculoskeletal: Positive for gait problem. Negative for back pain, joint swelling, myalgias, neck pain and neck stiffness. Skin: Negative for color change. Allergic/Immunologic: Negative for food allergies. Neurological: Positive for numbness. Negative for dizziness, tremors, seizures, syncope, facial asymmetry, speech difficulty, weakness, light-headedness and headaches. Psychiatric/Behavioral: Negative for behavioral problems, confusion, hallucinations and sleep disturbance. Objective:   /82 (Site: Right Upper Arm, Position: Sitting, Cuff Size: Medium Adult)   Pulse 79   Wt 175 lb 4.8 oz (79.5 kg)   BMI 27.46 kg/m²     Physical Exam  Vitals signs reviewed. Eyes:      Pupils: Pupils are equal, round, and reactive to light. Neck:      Musculoskeletal: Normal range of motion. Cardiovascular:      Rate and Rhythm: Normal rate and regular rhythm. Heart sounds: No murmur. Pulmonary:      Effort: Pulmonary effort is normal.      Breath sounds: Normal breath sounds. Abdominal:      General: Bowel sounds are normal.   Musculoskeletal: Normal range of motion. Skin:     General: Skin is warm. Neurological:      Mental Status: He is alert and oriented to person, place, and time. Cranial Nerves: No cranial nerve deficit. Sensory: No sensory deficit. Motor: No abnormal muscle tone. Coordination: Coordination normal.      Deep Tendon Reflexes: Babinski sign absent on the right side. Babinski sign absent on the left side. Reflex Scores:       Tricep reflexes are 2+ on the right side and 2+ on the left side. Bicep reflexes are 2+ on the right side and 2+ on the left side. Brachioradialis reflexes are 2+ on the right side and 2+ on the left side. Patellar reflexes are 1+ on the right side. Achilles reflexes are 0 on the right side and 0 on the left side. Psychiatric:         Mood and Affect: Mood normal.         Ct Chest Wo Contrast    Result Date: 3/7/2019  EXAMINATION:  CT CHEST WO CONTRAST CLINICAL HISTORY:  R91.1 Lung nodule ICD10 COMPARISONS:  April 26, 2018 TECHNIQUE:  Spiral axial images thorax were obtained without contrast enhancement. Multiplanar two-dimensional reformatting was performed at the ED console. Three-dimensional maximum intensity axial images were reformatted at the CT console. FINDINGS:  Platelike atelectasis and linear scarring in the right lower lobe hasn't changed. There is no sign of an active infiltrate today. There are no worrisome nodules. There are no lung masses. There is no effusion. Mediastinal lymph nodes are not pathologically enlarged. The heart is not enlarged. The thoracic aorta is unremarkable. The airway is unremarkable. The chest wall is unremarkable. SCARRING AND CHRONIC ATELECTASIS IN THE RIGHT LOWER LOBE. NO ACUTE PROCESS, UNCHANGED SINCE APRIL 26, 2018 All CT scans at this facility use dose modulation, iterative reconstruction, and/or weight based dosing when appropriate to reduce radiation dose to as low as reasonably achievable.       Lab Results   Component Value Date    WBC 7.5 10/21/2020    RBC 5.06 10/21/2020    HGB 15.3 10/21/2020    HCT 45.7 10/21/2020    MCV 90.3 10/21/2020    MCH 30.3 10/21/2020    MCHC 33.5 10/21/2020    RDW 13.4 10/21/2020     10/21/2020     Lab Results   Component Value Date     10/21/2020    K 4.9 10/21/2020     10/21/2020    CO2 25 10/21/2020    BUN 16 10/21/2020    CREATININE 1.05 10/21/2020    GFRAA >60.0 10/21/2020    LABGLOM >60.0 10/21/2020    GLUCOSE 126 12/22/2020    PROT 7.1 10/21/2020    LABALBU 4.4 10/21/2020    CALCIUM 9.2 10/21/2020    BILITOT 0.5 10/21/2020    ALKPHOS 80 10/21/2020    AST 34 10/21/2020    ALT 24 10/21/2020     No results found for: PROTIME, INR No results found for: TSH, [id number], FOLATE, FERRITIN, IRON, TIBC, PTRFSAT, TSH, FREET4  Lab Results   Component Value Date    TRIG 261 10/21/2020    HDL 50 10/21/2020    LDLCALC 91 10/21/2020     No results found for: Freeda Blades, LABBENZ, CANNAB, COCAINESCRN, LABMETH, OPIATESCREENURINE, PHENCYCLIDINESCREENURINE, PPXUR, ETOH  No results found for: LITHIUM, DILFRTOT, VALPROATE    Assessment:       Diagnosis Orders   1. Polyneuropathy associated with underlying disease [facility])  Ambulatory referral to Interventional Radiology    EMG   2. Numbness     3. Paresthesias     Small fiber neuropathy with findings clinically sensitive for neuropathy as well. We recommend an EMG nerve conduction study. For symptomatic treatment start him on gabapentin. Side effects of the medications and addiction potentials have been discussed. We will arrange for consultation with interventional radiology secondary to venous distention as many of her patients truly actually have venous disease more than diabetic neuropathies. We will follow him after the test.      Plan:      Orders Placed This Encounter   Procedures    Ambulatory referral to Interventional Radiology     Referral Priority:   Routine     Referral Type:   Consult for Advice and Opinion     Referral Reason:   Specialty Services Required     Requested Specialty:   Radiology     Number of Visits Requested:   1    EMG     Standing Status:   Future     Standing Expiration Date:   11/30/2021     Order Specific Question:   Which body part? Answer:   PN     Orders Placed This Encounter   Medications    gabapentin (NEURONTIN) 300 MG capsule     Sig: One qhs for 2 weeks, then  2  qhs     Dispense:  60 capsule     Refill:  1       Return in about 3 months (around 2/28/2021).       Kaylynn Roth MD

## 2020-12-03 ENCOUNTER — OFFICE VISIT (OUTPATIENT)
Dept: FAMILY MEDICINE CLINIC | Age: 55
End: 2020-12-03
Payer: COMMERCIAL

## 2020-12-03 VITALS
WEIGHT: 175 LBS | DIASTOLIC BLOOD PRESSURE: 102 MMHG | TEMPERATURE: 97.9 F | OXYGEN SATURATION: 98 % | SYSTOLIC BLOOD PRESSURE: 160 MMHG | BODY MASS INDEX: 27.47 KG/M2 | RESPIRATION RATE: 16 BRPM | HEART RATE: 86 BPM | HEIGHT: 67 IN

## 2020-12-03 DIAGNOSIS — J06.9 UPPER RESPIRATORY INFECTION WITH COUGH AND CONGESTION: ICD-10-CM

## 2020-12-03 PROCEDURE — G8484 FLU IMMUNIZE NO ADMIN: HCPCS | Performed by: NURSE PRACTITIONER

## 2020-12-03 PROCEDURE — G8427 DOCREV CUR MEDS BY ELIG CLIN: HCPCS | Performed by: NURSE PRACTITIONER

## 2020-12-03 PROCEDURE — 99213 OFFICE O/P EST LOW 20 MIN: CPT | Performed by: NURSE PRACTITIONER

## 2020-12-03 PROCEDURE — 3017F COLORECTAL CA SCREEN DOC REV: CPT | Performed by: NURSE PRACTITIONER

## 2020-12-03 PROCEDURE — G8419 CALC BMI OUT NRM PARAM NOF/U: HCPCS | Performed by: NURSE PRACTITIONER

## 2020-12-03 PROCEDURE — 1036F TOBACCO NON-USER: CPT | Performed by: NURSE PRACTITIONER

## 2020-12-03 RX ORDER — AZITHROMYCIN 250 MG/1
TABLET, FILM COATED ORAL
Qty: 6 TABLET | Refills: 0 | Status: SHIPPED | OUTPATIENT
Start: 2020-12-03 | End: 2020-12-08

## 2020-12-03 ASSESSMENT — PATIENT HEALTH QUESTIONNAIRE - PHQ9
SUM OF ALL RESPONSES TO PHQ QUESTIONS 1-9: 0
SUM OF ALL RESPONSES TO PHQ QUESTIONS 1-9: 0
SUM OF ALL RESPONSES TO PHQ9 QUESTIONS 1 & 2: 0
1. LITTLE INTEREST OR PLEASURE IN DOING THINGS: 0
SUM OF ALL RESPONSES TO PHQ QUESTIONS 1-9: 0
2. FEELING DOWN, DEPRESSED OR HOPELESS: 0

## 2020-12-03 ASSESSMENT — ENCOUNTER SYMPTOMS
SINUS COMPLAINT: 1
SORE THROAT: 1
SINUS PRESSURE: 1

## 2020-12-03 NOTE — PROGRESS NOTES
12/3/2020    SUBJECTIVE:     Jeannie Rivero, 48 y.o. male presents today with:  Chief Complaint   Patient presents with    Sinusitis     Patient presents with complaint of sinusitis. Symptoms have been present for approximately 1wk     Sinus Problem   This is a new problem. The current episode started in the past 7 days. The problem has been gradually worsening since onset. There has been no fever. Associated symptoms include congestion, coughing (non-productive), headaches, sinus pressure and a sore throat (\"scratchy\"). Pertinent negatives include no ear pain, neck pain, shortness of breath or swollen glands. (+ burning eyes, nasal burning/ pressure. denies anosmia/ dysgeusia.)     Review of Systems   Constitutional: Positive for fatigue. Negative for fever. HENT: Positive for congestion, postnasal drip, rhinorrhea, sinus pressure, sinus pain and sore throat (\"scratchy\"). Negative for ear pain. Eyes: Negative for photophobia. Respiratory: Positive for cough (non-productive). Negative for chest tightness and shortness of breath. Cardiovascular: Negative for chest pain. Gastrointestinal: Negative for abdominal pain, diarrhea and vomiting. Musculoskeletal: Negative for neck pain. Neurological: Positive for headaches. Negative for dizziness and light-headedness. OBJECTIVE:     Vitals:    12/03/20 1449   BP: (!) 160/102   Site: Left Upper Arm   Position: Sitting   Cuff Size: Medium Adult   Pulse: 86   Resp: 16   Temp: 97.9 °F (36.6 °C)   TempSrc: Tympanic   SpO2: 98%   Weight: 175 lb (79.4 kg)   Height: 5' 7\" (1.702 m)     Physical Exam  Vitals signs reviewed. Constitutional:       General: He is not in acute distress. Appearance: He is well-groomed. HENT:      Head: Normocephalic and atraumatic. Right Ear: Ear canal and external ear normal.      Left Ear: Ear canal and external ear normal.      Nose: Mucosal edema and congestion present. Right Turbinates: Pale. Left Turbinates: Pale. Right Sinus: Maxillary sinus tenderness present. Left Sinus: Maxillary sinus tenderness present. Mouth/Throat:      Lips: Pink. No lesions. Mouth: Mucous membranes are moist.      Pharynx: Uvula midline. Posterior oropharyngeal erythema (+ PND) present. Eyes:      General: Lids are normal.      Conjunctiva/sclera: Conjunctivae normal.      Pupils: Pupils are equal, round, and reactive to light. Neck:      Musculoskeletal: Normal range of motion and neck supple. Trachea: Trachea and phonation normal.   Cardiovascular:      Rate and Rhythm: Normal rate. Pulmonary:      Effort: Pulmonary effort is normal. No tachypnea. Breath sounds: Normal breath sounds and air entry. Musculoskeletal: Normal range of motion. Skin:     General: Skin is warm and dry. Capillary Refill: Capillary refill takes less than 2 seconds. Neurological:      General: No focal deficit present. Mental Status: He is alert. Mental status is at baseline. Gait: Gait is intact. Psychiatric:         Mood and Affect: Affect normal.         Speech: Speech normal.         Behavior: Behavior normal. Behavior is cooperative. POC Testing Today: No results found for this visit on 20. ASSESSMENT & PLAN:   48 y.o. male presenting with cough, congestion, sinus tenderness for one week. COVID-19 test ordered Antibiotic prescribed. Discussed supportive care measures and quarantine pending results. Work note provided    Diagnoses and all orders for this visit:    ICD-10-CM    1. Upper respiratory infection with cough and congestion  J06.9 azithromycin (ZITHROMAX) 250 MG tablet     COVID-19 Ambulatory     Orders Placed This Encounter   Medications    azithromycin (ZITHROMAX) 250 MG tablet     Si mg on day 1, then 250 mg days 2-5.      Dispense:  6 tablet     Refill:  0   OTC analgesics/antipyretics per package directions prn Push fluids, rest, use vaporizer or mist prn, saline nasal rinse, apply heat to sinuses prn  Continually monitor symptoms + contact a medical provider if symptoms are worsening, such as difficulty breathing    Antibiotic Instructions: Complete the full course of antibiotics as ordered. Take each dose with a small snack or meal to lessen potential GI upset. To prevent antibiotic resistance, please take medication as ordered and for the full duration even if you start to feel better. Consider intake of yogurt or probiotic during antibiotic use and for a few days after to help reduce the risk of developing a secondary infection. Separate the yogurt and antibiotic by at least 1 hour. Avoid alcohol while taking antibiotics. Return for follow-up as needed if symptoms fail to improve in the next 1 week or worsen. Side effects and adverse effects of any medication prescribed today, as well as treatment plan/rationale, follow-up care, and result expectations have been discussed with the patient. Ofelia Suárez expresses understanding and wishes to proceed with the plan. Discussed signs and symptoms which require immediate follow-up in ED/call to 911. Understanding verbalized. I have reviewed and updated the electronic medical record.     Benjamin Barrera, APRN - CNP

## 2020-12-03 NOTE — PATIENT INSTRUCTIONS
We'll call with COVID-19 results  Test results will also be available on your [facility] account    Antibiotic was sent to your pharmacy-- ok to begin today  Try saline nasal rinses to help clear debris from your nasal/ respiratory tract    Things to Know:   - Do not leave home except to get medical care while waiting for your results  - Testing does not change treatment--> there is no medication that treats COVID-19  - Get plenty of rest and stay hydrated   - Over the counter pain/ fever reducers such as ibuprofen (aka Motrin/ Advil) or acetaminophen (aka Tylenol) per dosage instructions as needed   - Avoid exposure to environmental irritants/ allergens where possible    - Practice social distancing and wear a face mask when leaving home is necessary  - Continually monitor symptoms + contact a medical provider if symptoms are worsening, such as difficulty breathing  - Symptoms may develop 2 days to 2 weeks following exposure to the virus- if you are exposed after testing, or if you were in the incubation period when tested, you could become ill with COVID-19 late  Patient Education   10 Things to Do When You Have COVID-19    Stay home. Don't go to school, work, or public areas. And don't use public transportation, ride-shares, or taxis unless you have no choice. Leave your home only if you need to get medical care. But call the doctor's office first so they know you're coming. And wear a cloth face cover. Ask before leaving isolation. Talk with your doctor or other health professional about when it will be safe for you to leave isolation. Wear a cloth face cover when you are around other people. It can help stop the spread of the virus when you cough or sneeze. Limit contact with people in your home. If possible, stay in a separate bedroom and use a separate bathroom. Avoid contact with pets and other animals.   If possible, have a friend or family member care for them while you're sick.     Acquanetta Scarce your mouth and nose with a tissue when you cough or sneeze. Then throw the tissue in the trash right away. Wash your hands often, especially after you cough or sneeze. Use soap and water, and scrub for at least 20 seconds. If soap and water aren't available, use an alcohol-based hand . Don't share personal household items. These include bedding, towels, cups and glasses, and eating utensils. Clean and disinfect your home every day. Use household  or disinfectant wipes or sprays. Take special care to clean things that you grab with your hands. These include doorknobs, remote controls, phones, and handles on your refrigerator and microwave. And don't forget countertops, tabletops, bathrooms, and computer keyboards. Take acetaminophen (Tylenol) to relieve fever and body aches. Read and follow all instructions on the label. Current as of: July 10, 2020               Content Version: 12.6  © 2006-2020 [employer]. Care instructions adapted under license by [facility] ([facility]). If you have questions about a medical condition or this instruction, always ask your healthcare professional. [place] [address] any warranty or liability for your use of this information.

## 2020-12-05 LAB
SARS-COV-2: NOT DETECTED
SOURCE: NORMAL

## 2020-12-21 NOTE — PROGRESS NOTES
Assessed pts urine this a.m. Small amount of stringy blood streaks noted. 77117 - Inpatient Moderate Complexity

## 2020-12-22 ENCOUNTER — OFFICE VISIT (OUTPATIENT)
Dept: ENDOCRINOLOGY | Age: 55
End: 2020-12-22
Payer: COMMERCIAL

## 2020-12-22 VITALS
HEART RATE: 80 BPM | WEIGHT: 175 LBS | BODY MASS INDEX: 27.47 KG/M2 | HEIGHT: 67 IN | DIASTOLIC BLOOD PRESSURE: 79 MMHG | SYSTOLIC BLOOD PRESSURE: 117 MMHG

## 2020-12-22 LAB
CHP ED QC CHECK: NORMAL
GLUCOSE BLD-MCNC: 126 MG/DL
HBA1C MFR BLD: 7.6 %

## 2020-12-22 PROCEDURE — 3017F COLORECTAL CA SCREEN DOC REV: CPT | Performed by: PHYSICIAN ASSISTANT

## 2020-12-22 PROCEDURE — 3051F HG A1C>EQUAL 7.0%<8.0%: CPT | Performed by: PHYSICIAN ASSISTANT

## 2020-12-22 PROCEDURE — G8419 CALC BMI OUT NRM PARAM NOF/U: HCPCS | Performed by: PHYSICIAN ASSISTANT

## 2020-12-22 PROCEDURE — G8427 DOCREV CUR MEDS BY ELIG CLIN: HCPCS | Performed by: PHYSICIAN ASSISTANT

## 2020-12-22 PROCEDURE — 2022F DILAT RTA XM EVC RTNOPTHY: CPT | Performed by: PHYSICIAN ASSISTANT

## 2020-12-22 PROCEDURE — G8484 FLU IMMUNIZE NO ADMIN: HCPCS | Performed by: PHYSICIAN ASSISTANT

## 2020-12-22 PROCEDURE — 99213 OFFICE O/P EST LOW 20 MIN: CPT | Performed by: PHYSICIAN ASSISTANT

## 2020-12-22 PROCEDURE — 82962 GLUCOSE BLOOD TEST: CPT | Performed by: PHYSICIAN ASSISTANT

## 2020-12-22 PROCEDURE — 83036 HEMOGLOBIN GLYCOSYLATED A1C: CPT | Performed by: PHYSICIAN ASSISTANT

## 2020-12-22 PROCEDURE — 1036F TOBACCO NON-USER: CPT | Performed by: PHYSICIAN ASSISTANT

## 2020-12-22 RX ORDER — OMEGA-3-ACID ETHYL ESTERS 1 G/1
CAPSULE, LIQUID FILLED ORAL
Qty: 120 CAPSULE | Refills: 3 | Status: SHIPPED | OUTPATIENT
Start: 2020-12-22 | End: 2021-03-23 | Stop reason: SDUPTHER

## 2020-12-22 RX ORDER — EMPAGLIFLOZIN 10 MG/1
10 TABLET, FILM COATED ORAL DAILY
Qty: 30 TABLET | Refills: 3 | Status: SHIPPED | OUTPATIENT
Start: 2020-12-22 | End: 2021-07-06

## 2020-12-22 RX ORDER — PIOGLITAZONEHYDROCHLORIDE 15 MG/1
15 TABLET ORAL DAILY
Qty: 30 TABLET | Refills: 3 | Status: SHIPPED | OUTPATIENT
Start: 2020-12-22 | End: 2021-07-06

## 2020-12-22 RX ORDER — FENOFIBRATE 145 MG/1
145 TABLET, COATED ORAL DAILY
Qty: 30 TABLET | Refills: 3 | Status: SHIPPED | OUTPATIENT
Start: 2020-12-22 | End: 2021-07-06

## 2020-12-22 RX ORDER — DULAGLUTIDE 1.5 MG/.5ML
1.5 INJECTION, SOLUTION SUBCUTANEOUS WEEKLY
Qty: 4 PEN | Refills: 3 | Status: SHIPPED | OUTPATIENT
Start: 2020-12-22 | End: 2021-09-17 | Stop reason: ALTCHOICE

## 2020-12-22 ASSESSMENT — ENCOUNTER SYMPTOMS
COUGH: 0
WHEEZING: 0
EYE PAIN: 0
DIARRHEA: 0
EYE REDNESS: 0
SINUS PRESSURE: 0
NAUSEA: 0
RHINORRHEA: 1
SHORTNESS OF BREATH: 0
SORE THROAT: 1
VOMITING: 0
ABDOMINAL PAIN: 0

## 2020-12-22 NOTE — PROGRESS NOTES
presents with    Diabetes     Vitals:    12/22/20 1102   BP: 117/79   Site: Right Upper Arm   Position: Sitting   Cuff Size: Large Adult   Pulse: 80   Weight: 175 lb (79.4 kg)   Height: 5' 7\" (1.702 m)     Wt Readings from Last 3 Encounters:   12/22/20 175 lb (79.4 kg)   12/03/20 175 lb (79.4 kg)   11/30/20 175 lb 4.8 oz (79.5 kg)     BP Readings from Last 3 Encounters:   12/22/20 117/79   12/03/20 (!) 160/102   11/30/20 120/82             Diabetes  He presents for his follow-up diabetic visit. He has type 2 diabetes mellitus. The initial diagnosis of diabetes was made 5 years ago. His disease course has been stable. Hypoglycemia symptoms include sweats and tremors (With hypoglycemia). Pertinent negatives for hypoglycemia include no dizziness or headaches. There are no diabetic associated symptoms. Pertinent negatives for diabetes include no chest pain, no fatigue, no polydipsia and no polyuria. There are no hypoglycemic complications. There are no diabetic complications. Risk factors for coronary artery disease include dyslipidemia, male sex and hypertension. Current diabetic treatment includes insulin injections and oral agent (dual therapy). He is compliant with treatment all of the time. He is following a generally healthy diet. Meal planning includes avoidance of concentrated sweets and carbohydrate counting. He rarely participates in exercise. His home blood glucose trend is decreasing steadily. His overall blood glucose range is 140-180 mg/dl. An ACE inhibitor/angiotensin II receptor blocker is not being taken. He does not see a podiatrist.Eye exam is current.        Past Medical History:   Diagnosis Date    Acid reflux disease with ulcer 03/19/2018    Bilateral carpal tunnel syndrome 9/24/2020    Diabetes mellitus ([address]     BUCKLEY (dyspnea on exertion) 2/2/2018    Hypertension     Mixed hyperlipidemia 2/2/2018       Current Outpatient Medications:     pioglitazone (ACTOS) 15 MG tablet, Take 1 tablet by mouth daily, Disp: 30 tablet, Rfl: 3    metFORMIN (GLUCOPHAGE) 500 MG tablet, Take 1 tablet by mouth 2 times daily (with meals), Disp: 60 tablet, Rfl: 3    Dulaglutide (TRULICITY) 1.5 EK/3.5UU SOPN, Inject 1.5 mg into the skin once a week, Disp: 4 pen, Rfl: 3    empagliflozin (JARDIANCE) 10 MG tablet, Take 1 tablet by mouth daily, Disp: 30 tablet, Rfl: 3    fenofibrate (TRICOR) 145 MG tablet, Take 1 tablet by mouth daily, Disp: 30 tablet, Rfl: 3    omega-3 acid ethyl esters (LOVAZA) 1 g capsule, take 2 capsules by mouth twice a day, Disp: 120 capsule, Rfl: 3    gabapentin (NEURONTIN) 300 MG capsule, One qhs for 2 weeks, then  2  qhs, Disp: 60 capsule, Rfl: 1    lisinopril (PRINIVIL;ZESTRIL) 5 MG tablet, take 1 tablet by mouth once daily, Disp: 30 tablet, Rfl: 0    FreeStyle Lancets MISC, 1 each by Does not apply route 3 times daily, Disp: 100 each, Rfl: 5    insulin NPH (HUMULIN N) 100 UNIT/ML injection vial, inject 45 units at bedtime, Disp: 20 mL, Rfl: 3    atorvastatin (LIPITOR) 10 MG tablet, take 1 tablet by mouth once daily, Disp: 90 tablet, Rfl: 0    carvedilol (COREG) 6.25 MG tablet, take 1 tablet by mouth twice a day with food, Disp: 180 tablet, Rfl: 0    montelukast (SINGULAIR) 10 MG tablet, take 1 tablet by mouth once daily, Disp: 30 tablet, Rfl: 3    blood glucose test strips (FREESTYLE LITE) strip, TEST three times a day, Disp: 100 strip, Rfl: 6    insulin regular (HUMULIN R) 100 UNIT/ML injection, INJECT 12-15 UNITS AT [facility] MEAL, Disp: 30 mL, Rfl: 3    sildenafil (VIAGRA) 100 MG tablet, Take 1 tablet by mouth as needed for Erectile Dysfunction, Disp: 30 tablet, Rfl: 3    BD INSULIN SYRINGE U/F 31G X 5/16\" 0.5 ML MISC, use four times a day, Disp: 200 each, Rfl: 3    Fluticasone Furoate-Vilanterol (BREO ELLIPTA) 200-25 MCG/INH AEPB, inhalation 1 puff by mouth once daily, Disp: 1 each, Rfl: 1    omeprazole (PRILOSEC) 40 MG delayed release capsule, Take 1 capsule by mouth daily, Disp: 30 capsule, Rfl: 11    aspirin 81 MG tablet, Take 81 mg by mouth daily, Disp: , Rfl:     albuterol sulfate HFA (PROAIR HFA) 108 (90 Base) MCG/ACT inhaler, Inhale 2 puffs into the lungs every 6 hours as needed for Wheezing, Disp: 1 Inhaler, Rfl: 3    Insulin Syringe-Needle U-100 31G X 5/16\" 1 ML MISC, 1 each by Does not apply route 4 times daily, Disp: 150 each, Rfl: 3  Lab Results   Component Value Date     10/21/2020    K 4.9 10/21/2020     10/21/2020    CO2 25 10/21/2020    BUN 16 10/21/2020    CREATININE 1.05 10/21/2020    GLUCOSE 126 12/22/2020    CALCIUM 9.2 10/21/2020    PROT 7.1 10/21/2020    LABALBU 4.4 10/21/2020    BILITOT 0.5 10/21/2020    ALKPHOS 80 10/21/2020    AST 34 10/21/2020    ALT 24 10/21/2020    LABGLOM >60.0 10/21/2020    GFRAA >60.0 10/21/2020    GLOB 2.7 10/21/2020     Lab Results   Component Value Date    WBC 7.5 10/21/2020    HGB 15.3 10/21/2020    HCT 45.7 10/21/2020    MCV 90.3 10/21/2020     10/21/2020     Lab Results   Component Value Date    LABA1C 7.6 12/22/2020    LABA1C 8.0 09/24/2020    LABA1C 6.5 (H) 12/16/2019     Lab Results   Component Value Date    CHOL 193 10/21/2020    CHOL 145 12/16/2019    CHOL 187 03/25/2019      Lab Results   Component Value Date    TRIG 261 (H) 10/21/2020    TRIG 218 (H) 12/16/2019    TRIG 432 (H) 03/25/2019     Lab Results   Component Value Date    HDL 50 10/21/2020    HDL 52 12/16/2019    HDL 31 (L) 03/25/2019     Lab Results   Component Value Date    LDLCALC 91 10/21/2020    LDLCALC 49 12/16/2019    [address] see below 03/25/2019     No results found for: LABVLDL, VLDL  No results found for: CHOLHDLRATIO  No results found for: TESTOSTERONE  No results found for: TSH, K1EBDWS, U0JJNUI, THYROIDAB        No Known Allergies    Past Surgical History:   Procedure Laterality Date    CYSTOSCOPY Right 12/13/2017    CYSTOSCOPY RIGHT  RETROGRADE PYELOGRAM AND REMOVAL OF RIGHT DJ STENT (LABS DONE 11/27) performed by Osmin Santana [facility]    CYSTOSCOPY INSERTION / REMOVAL STENT / STONE Right 11/8/2017    CYSTOSCOPY RIGHT DOUBLE J  STENT INSERTION performed by Mary Anne Sotelo MD at [facility] FLX DX W/COLLJ [address] [facility] [address] WHEN PFRMD N/A 3/19/2018    COLONOSCOPY performed by Blair Guillen MD at [address] ESOPHAGOGASTRODUODENOSCOPY TRANSORAL DIAGNOSTIC N/A 3/19/2018    EGD ESOPHAGOGASTRODUODENOSCOPY performed by Blair Guillen MD at [address] [place] [address] [address] Marital status:      Spouse name: Not on file    Number of children: Not on file    Years of education: Not on file    Highest education level: Not on file   Occupational History    Not on file   Social Needs    Financial resource strain: Not on file    Food insecurity     Worry: Not on file     Inability: Not on file    Transportation needs     Medical: Not on file     Non-medical: Not on file   Tobacco Use    Smoking status: Never Smoker    Smokeless tobacco: Never Used   Substance and Sexual Activity    Alcohol use: Yes     Comment: occasional    Drug use: No    Sexual activity: Not on file   Lifestyle    Physical activity     Days per week: Not on file     Minutes per session: Not on file    Stress: Not on file   Relationships    Social connections     Talks on phone: Not on file     Gets together: Not on file     Attends Christian service: Not on file     Active member of club or organization: Not on file     Attends meetings of clubs or organizations: Not on file     Relationship status: Not on file    Intimate partner violence     Fear of current or ex partner: Not on file     Emotionally abused: Not on file     Physically abused: Not on file     Forced sexual activity: Not on file   Other Topics Concern    Not on file   Social History Narrative    Not on file     Family History   Problem Relation Age of Onset    Hypertension Father     Heart Disease Father Review of Systems   Constitutional: Negative for chills, fatigue and fever. HENT: Positive for congestion, rhinorrhea and sore throat. Negative for ear pain, postnasal drip and sinus pressure. Eyes: Negative for pain and redness. Respiratory: Negative for cough, shortness of breath and wheezing. Cardiovascular: Negative for chest pain, palpitations and leg swelling. Gastrointestinal: Negative for abdominal pain, diarrhea, nausea and vomiting. Endocrine: Negative for cold intolerance, heat intolerance, polydipsia and polyuria. Genitourinary: Negative for difficulty urinating. Musculoskeletal: Negative for arthralgias. Skin: Negative for rash. Allergic/Immunologic: Negative for food allergies. Neurological: Positive for tremors (With hypoglycemia). Negative for dizziness and headaches. Peripheral neuropathy   Hematological: Negative for adenopathy. Psychiatric/Behavioral: Negative for agitation. Objective:   Physical Exam  Constitutional:       Appearance: He is well-developed. HENT:      Head: Normocephalic and atraumatic. Nose: No congestion. Mouth/Throat:      Mouth: Mucous membranes are moist.   Eyes:      Conjunctiva/sclera: Conjunctivae normal.   Neck:      Musculoskeletal: Normal range of motion and neck supple. Thyroid: No thyromegaly. Cardiovascular:      Rate and Rhythm: Normal rate and regular rhythm. Heart sounds: Normal heart sounds. No murmur. Pulmonary:      Effort: Pulmonary effort is normal. No respiratory distress. Breath sounds: Normal breath sounds. Abdominal:      General: Bowel sounds are normal. There is no distension. Palpations: Abdomen is soft. Tenderness: There is no abdominal tenderness. Musculoskeletal: Normal range of motion. General: No tenderness. Lymphadenopathy:      Cervical: No cervical adenopathy. Skin:     General: Skin is warm and dry.    Neurological:      Mental Status: He is alert and oriented to person, place, and time.    Psychiatric:         Mood and Affect: Mood normal.

## 2020-12-23 ASSESSMENT — ENCOUNTER SYMPTOMS
CHEST TIGHTNESS: 0
ABDOMINAL PAIN: 0
SWOLLEN GLANDS: 0
VOMITING: 0
RHINORRHEA: 1
PHOTOPHOBIA: 0
SHORTNESS OF BREATH: 0
SINUS PAIN: 1
COUGH: 1
DIARRHEA: 0

## 2021-01-24 PROBLEM — G63 POLYNEUROPATHY ASSOCIATED WITH UNDERLYING DISEASE (HCC): Status: ACTIVE | Noted: 2021-01-24

## 2021-01-24 PROBLEM — R20.0 NUMBNESS: Status: ACTIVE | Noted: 2021-01-24

## 2021-01-24 PROBLEM — R20.2 PARESTHESIAS: Status: ACTIVE | Noted: 2021-01-24

## 2021-01-24 ASSESSMENT — ENCOUNTER SYMPTOMS
PHOTOPHOBIA: 0
CHOKING: 0
BACK PAIN: 0
TROUBLE SWALLOWING: 0
VOMITING: 0
NAUSEA: 0
COLOR CHANGE: 0
SHORTNESS OF BREATH: 0

## 2021-03-16 LAB
ALBUMIN SERPL-MCNC: 4.2 G/DL (ref 3.5–4.6)
ALP BLD-CCNC: 80 U/L (ref 35–104)
ALT SERPL-CCNC: 18 U/L (ref 0–41)
ANION GAP SERPL CALCULATED.3IONS-SCNC: 12 MEQ/L (ref 9–15)
AST SERPL-CCNC: 28 U/L (ref 0–40)
BILIRUB SERPL-MCNC: 0.3 MG/DL (ref 0.2–0.7)
BUN BLDV-MCNC: 18 MG/DL (ref 6–20)
CALCIUM SERPL-MCNC: 9.1 MG/DL (ref 8.5–9.9)
CHLORIDE BLD-SCNC: 106 MEQ/L (ref 95–107)
CO2: 21 MEQ/L (ref 20–31)
CREAT SERPL-MCNC: 1.3 MG/DL (ref 0.7–1.2)
GFR AFRICAN AMERICAN: >60
GFR NON-AFRICAN AMERICAN: 57.5
GLOBULIN: 3 G/DL (ref 2.3–3.5)
GLUCOSE BLD-MCNC: 174 MG/DL (ref 70–99)
HBA1C MFR BLD: 7.9 % (ref 4.8–5.9)
POTASSIUM SERPL-SCNC: 4.3 MEQ/L (ref 3.4–4.9)
SODIUM BLD-SCNC: 139 MEQ/L (ref 135–144)
TOTAL PROTEIN: 7.2 G/DL (ref 6.3–8)

## 2021-03-19 RX ORDER — BLOOD-GLUCOSE METER
KIT MISCELLANEOUS
Qty: 100 STRIP | Refills: 6 | Status: SHIPPED | OUTPATIENT
Start: 2021-03-19 | End: 2022-03-14

## 2021-03-20 RX ORDER — GABAPENTIN 300 MG/1
CAPSULE ORAL
Qty: 60 CAPSULE | Refills: 1 | Status: SHIPPED | OUTPATIENT
Start: 2021-03-20 | End: 2021-03-26 | Stop reason: SDUPTHER

## 2021-03-23 ENCOUNTER — OFFICE VISIT (OUTPATIENT)
Dept: ENDOCRINOLOGY | Age: 56
End: 2021-03-23
Payer: COMMERCIAL

## 2021-03-23 VITALS
HEIGHT: 67 IN | BODY MASS INDEX: 27.15 KG/M2 | SYSTOLIC BLOOD PRESSURE: 101 MMHG | HEART RATE: 92 BPM | WEIGHT: 173 LBS | DIASTOLIC BLOOD PRESSURE: 72 MMHG

## 2021-03-23 LAB
CHP ED QC CHECK: NORMAL
GLUCOSE BLD-MCNC: 164 MG/DL

## 2021-03-23 PROCEDURE — G8419 CALC BMI OUT NRM PARAM NOF/U: HCPCS | Performed by: PHYSICIAN ASSISTANT

## 2021-03-23 PROCEDURE — G8484 FLU IMMUNIZE NO ADMIN: HCPCS | Performed by: PHYSICIAN ASSISTANT

## 2021-03-23 PROCEDURE — G8427 DOCREV CUR MEDS BY ELIG CLIN: HCPCS | Performed by: PHYSICIAN ASSISTANT

## 2021-03-23 PROCEDURE — 3051F HG A1C>EQUAL 7.0%<8.0%: CPT | Performed by: PHYSICIAN ASSISTANT

## 2021-03-23 PROCEDURE — 1036F TOBACCO NON-USER: CPT | Performed by: PHYSICIAN ASSISTANT

## 2021-03-23 PROCEDURE — 3017F COLORECTAL CA SCREEN DOC REV: CPT | Performed by: PHYSICIAN ASSISTANT

## 2021-03-23 PROCEDURE — 82962 GLUCOSE BLOOD TEST: CPT | Performed by: PHYSICIAN ASSISTANT

## 2021-03-23 PROCEDURE — 2022F DILAT RTA XM EVC RTNOPTHY: CPT | Performed by: PHYSICIAN ASSISTANT

## 2021-03-23 PROCEDURE — 99213 OFFICE O/P EST LOW 20 MIN: CPT | Performed by: PHYSICIAN ASSISTANT

## 2021-03-23 RX ORDER — OMEGA-3-ACID ETHYL ESTERS 1 G/1
CAPSULE, LIQUID FILLED ORAL
Qty: 120 CAPSULE | Refills: 3 | Status: SHIPPED | OUTPATIENT
Start: 2021-03-23 | End: 2021-09-17 | Stop reason: SDUPTHER

## 2021-03-23 ASSESSMENT — ENCOUNTER SYMPTOMS
RHINORRHEA: 1
NAUSEA: 0
EYE PAIN: 0
COUGH: 0
EYE REDNESS: 0
SORE THROAT: 1
ABDOMINAL PAIN: 0
WHEEZING: 0
SINUS PRESSURE: 0
DIARRHEA: 0
SHORTNESS OF BREATH: 0
VOMITING: 0

## 2021-03-23 NOTE — PATIENT INSTRUCTIONS
Endocrinology-diabetes    1. Check your blood sugars 4 times a day, before meals and at night  2. Document these numbers and a blood glucose log and bring them with you to your follow-up appointment. 3. Call our office if you have blood sugars less than 80 or greater then 200 on two or more occasions  4. Call our office if you have any questions regarding your blood sugars or insulin dosing regiment  5. Signs of low blood sugar include sweating , heart racing, dizziness and weakness. Check your blood sugar if you have any of these symptoms. Medications-  1. Insulin only if glucose > 150 (only needed twice in last 2 weeks)   2. Continue Actos 15 mg by mouth daily  3. Continue Jardiance 10 mg daily   4. Continue Trulicity 1.5 mg weekly  5. Continue Metformon 500 mg twice a day    6. Monitor glycemic control closely, worsening glycemic control   7. Sildenafil 100 mg p.o. daily as needed  8.  Repeat labs and follow up in 3 months
car

## 2021-03-23 NOTE — PROGRESS NOTES
Assessment:       Diagnosis Orders   1. Uncontrolled type 2 diabetes mellitus with complication, with long-term current use of insulin ([facility])  omega-3 acid ethyl esters (LOVAZA) 1 g capsule    POCT Glucose    Hemoglobin A1C    Comprehensive Metabolic Panel    HM DIABETES FOOT EXAM    Testosterone Free and Total Male   Average blood glucose 100-150 without significant hyper or hypoglycemic events  PLAN:     1. Insulin only if glucose > 150 (only needed twice in last 2 weeks)   2. Continue Actos 15 mg by mouth daily  3. Continue Jardiance 10 mg daily   4. Continue Trulicity 1.5 mg weekly  5. Continue Metformon 500 mg twice a day    6. Monitor glycemic control closely, worsening glycemic control   7. Sildenafil 100 mg p.o. daily as needed  8. Repeat labs and follow up in 3 months   Orders Placed This Encounter   Procedures    Hemoglobin A1C     Standing Status:   Future     Standing Expiration Date:   3/23/2022    Comprehensive Metabolic Panel     Standing Status:   Future     Standing Expiration Date:   3/23/2022    Testosterone Free and Total Male     Standing Status:   Future     Standing Expiration Date:   3/23/2022    POCT Glucose     DIABETES FOOT EXAM     Orders Placed This Encounter   Medications    omega-3 acid ethyl esters (LOVAZA) 1 g capsule     Sig: take 2 capsules by mouth twice a day     Dispense:  120 capsule     Refill:  3       Return in about 3 months (around 6/23/2021) for Diabetes.     Subjective:     Chief Complaint   Patient presents with    Diabetes     Vitals:    03/23/21 1057   BP: 101/72   Site: Left Upper Arm   Position: Sitting   Cuff Size: Large Adult   Pulse: 92   Weight: 173 lb (78.5 kg)   Height: 5' 7\" (1.702 m)     Wt Readings from Last 3 Encounters:   03/23/21 173 lb (78.5 kg)   12/22/20 175 lb (79.4 kg)   12/03/20 175 lb (79.4 kg)     BP Readings from Last 3 Encounters:   03/23/21 101/72   12/22/20 117/79   12/03/20 (!) 160/102             Diabetes  He presents for his follow-up diabetic visit. He has type 2 diabetes mellitus. The initial diagnosis of diabetes was made 5 years ago. His disease course has been stable. Hypoglycemia symptoms include sweats and tremors (With hypoglycemia). Pertinent negatives for hypoglycemia include no dizziness or headaches. There are no diabetic associated symptoms. Pertinent negatives for diabetes include no chest pain, no fatigue, no polydipsia and no polyuria. There are no hypoglycemic complications. There are no diabetic complications. Risk factors for coronary artery disease include dyslipidemia, male sex and hypertension. Current diabetic treatment includes insulin injections and oral agent (dual therapy). He is compliant with treatment all of the time. He is following a generally healthy diet. Meal planning includes avoidance of concentrated sweets and carbohydrate counting. He rarely participates in exercise. His home blood glucose trend is decreasing steadily. His overall blood glucose range is 140-180 mg/dl. An ACE inhibitor/angiotensin II receptor blocker is not being taken. He does not see a podiatrist.Eye exam is current.        Past Medical History:   Diagnosis Date    Acid reflux disease with ulcer 03/19/2018    Bilateral carpal tunnel syndrome 9/24/2020    Diabetes mellitus ([address]     BUCKLEY (dyspnea on exertion) 2/2/2018    Hypertension     Mixed hyperlipidemia 2/2/2018       Current Outpatient Medications:     omega-3 acid ethyl esters (LOVAZA) 1 g capsule, take 2 capsules by mouth twice a day, Disp: 120 capsule, Rfl: 3    gabapentin (NEURONTIN) 300 MG capsule, One qhs for 2 weeks, then  2  qhs, Disp: 60 capsule, Rfl: 1    blood glucose test strips (FREESTYLE LITE) strip, TEST three times a day, Disp: 100 strip, Rfl: 6    pioglitazone (ACTOS) 15 MG tablet, Take 1 tablet by mouth daily, Disp: 30 tablet, Rfl: 3    metFORMIN (GLUCOPHAGE) 500 MG tablet, Take 1 tablet by mouth 2 times daily (with meals), Disp: 60 tablet, Rfl: 3    Dulaglutide (TRULICITY) 1.5 GW/0.3RR SOPN, Inject 1.5 mg into the skin once a week, Disp: 4 pen, Rfl: 3    empagliflozin (JARDIANCE) 10 MG tablet, Take 1 tablet by mouth daily, Disp: 30 tablet, Rfl: 3    fenofibrate (TRICOR) 145 MG tablet, Take 1 tablet by mouth daily, Disp: 30 tablet, Rfl: 3    lisinopril (PRINIVIL;ZESTRIL) 5 MG tablet, take 1 tablet by mouth once daily, Disp: 30 tablet, Rfl: 0    FreeStyle Lancets MISC, 1 each by Does not apply route 3 times daily, Disp: 100 each, Rfl: 5    atorvastatin (LIPITOR) 10 MG tablet, take 1 tablet by mouth once daily, Disp: 90 tablet, Rfl: 0    carvedilol (COREG) 6.25 MG tablet, take 1 tablet by mouth twice a day with food, Disp: 180 tablet, Rfl: 0    montelukast (SINGULAIR) 10 MG tablet, take 1 tablet by mouth once daily, Disp: 30 tablet, Rfl: 3    insulin regular (HUMULIN R) 100 UNIT/ML injection, INJECT 12-15 UNITS AT [facility] MEAL, Disp: 30 mL, Rfl: 3    sildenafil (VIAGRA) 100 MG tablet, Take 1 tablet by mouth as needed for Erectile Dysfunction, Disp: 30 tablet, Rfl: 3    BD INSULIN SYRINGE U/F 31G X 5/16\" 0.5 ML MISC, use four times a day, Disp: 200 each, Rfl: 3    Fluticasone Furoate-Vilanterol (BREO ELLIPTA) 200-25 MCG/INH AEPB, inhalation 1 puff by mouth once daily, Disp: 1 each, Rfl: 1    omeprazole (PRILOSEC) 40 MG delayed release capsule, Take 1 capsule by mouth daily, Disp: 30 capsule, Rfl: 11    aspirin 81 MG tablet, Take 81 mg by mouth daily, Disp: , Rfl:     albuterol sulfate HFA (PROAIR HFA) 108 (90 Base) MCG/ACT inhaler, Inhale 2 puffs into the lungs every 6 hours as needed for Wheezing, Disp: 1 Inhaler, Rfl: 3    Insulin Syringe-Needle U-100 31G X 5/16\" 1 ML MISC, 1 each by Does not apply route 4 times daily, Disp: 150 each, Rfl: 3  Lab Results   Component Value Date     03/16/2021    K 4.3 03/16/2021     03/16/2021    CO2 21 03/16/2021    BUN 18 03/16/2021    CREATININE 1.30 (H) 03/16/2021    GLUCOSE 164 03/23/2021 CALCIUM 9.1 03/16/2021    PROT 7.2 03/16/2021    LABALBU 4.2 03/16/2021    BILITOT 0.3 03/16/2021    ALKPHOS 80 03/16/2021    AST 28 03/16/2021    ALT 18 03/16/2021    LABGLOM 57.5 (L) 03/16/2021    GFRAA >60.0 03/16/2021    GLOB 3.0 03/16/2021     Lab Results   Component Value Date    WBC 7.5 10/21/2020    HGB 15.3 10/21/2020    HCT 45.7 10/21/2020    MCV 90.3 10/21/2020     10/21/2020     Lab Results   Component Value Date    LABA1C 7.9 (H) 03/16/2021    LABA1C 7.6 12/22/2020    LABA1C 8.0 09/24/2020     Lab Results   Component Value Date    CHOL 193 10/21/2020    CHOL 145 12/16/2019    CHOL 187 03/25/2019      Lab Results   Component Value Date    TRIG 261 (H) 10/21/2020    TRIG 218 (H) 12/16/2019    TRIG 432 (H) 03/25/2019     Lab Results   Component Value Date    HDL 50 10/21/2020    HDL 52 12/16/2019    HDL 31 (L) 03/25/2019     Lab Results   Component Value Date    LDLCALC 91 10/21/2020    LDLCALC 49 12/16/2019    LDLCALC see below 03/25/2019     No results found for: LABVLDL, VLDL  No results found for: CHOLHDLRATIO  No results found for: TESTOSTERONE  No results found for: TSH, A3XWIHV, Y3EOWFV, THYROIDAB        No Known Allergies    Past Surgical History:   Procedure Laterality Date    CYSTOSCOPY Right 12/13/2017    CYSTOSCOPY RIGHT  RETROGRADE PYELOGRAM AND REMOVAL OF RIGHT DJ STENT (LABS DONE 11/27) performed by Mariana Thompson MD at [address] [place] [address] / [facility] Right 11/8/2017    CYSTOSCOPY RIGHT DOUBLE J  STENT INSERTION performed by Mariana Thompson MD at [address] [facility] COLONOSCOPY FLX DX W/[address]ACY [address] 1977 PFRMD N/A 3/19/2018    COLONOSCOPY performed by Bola Nicolas MD at [facility] ESOPHAGOGASTRODUODENOSCOPY TRANSORAL DIAGNOSTIC N/A 3/19/2018    EGD ESOPHAGOGASTRODUODENOSCOPY performed by Bola Nicolas MD at [address] [place] [address] Marital status:      Spouse name: Not on file    Number of children: Not on file    Years of education: Not on file    Highest education level: Not on file   Occupational History    Not on file   Social Needs    Financial resource strain: Not on file    Food insecurity     Worry: Not on file     Inability: Not on file    Transportation needs     Medical: Not on file     Non-medical: Not on file   Tobacco Use    Smoking status: Never Smoker    Smokeless tobacco: Never Used   Substance and Sexual Activity    Alcohol use: Yes     Comment: occasional    Drug use: No    Sexual activity: Not on file   Lifestyle    Physical activity     Days per week: Not on file     Minutes per session: Not on file    Stress: Not on file   Relationships    Social connections     Talks on phone: Not on file     Gets together: Not on file     Attends Quaker service: Not on file     Active member of club or organization: Not on file     Attends meetings of clubs or organizations: Not on file     Relationship status: Not on file    Intimate partner violence     Fear of current or ex partner: Not on file     Emotionally abused: Not on file     Physically abused: Not on file     Forced sexual activity: Not on file   Other Topics Concern    Not on file   Social History Narrative    Not on file     Family History   Problem Relation Age of Onset    Hypertension Father     Heart Disease Father          Review of Systems   Constitutional: Negative for chills, fatigue and fever. HENT: Positive for congestion, rhinorrhea and sore throat. Negative for ear pain, postnasal drip and sinus pressure. Eyes: Negative for pain and redness. Respiratory: Negative for cough, shortness of breath and wheezing. Cardiovascular: Negative for chest pain, palpitations and leg swelling. Gastrointestinal: Negative for abdominal pain, diarrhea, nausea and vomiting. Endocrine: Negative for cold intolerance, heat intolerance, polydipsia and polyuria.

## 2021-03-26 RX ORDER — GABAPENTIN 300 MG/1
CAPSULE ORAL
Qty: 60 CAPSULE | Refills: 1 | Status: SHIPPED | OUTPATIENT
Start: 2021-03-26 | End: 2021-04-21 | Stop reason: SDUPTHER

## 2021-03-31 ENCOUNTER — HOSPITAL ENCOUNTER (OUTPATIENT)
Dept: NEUROLOGY | Age: 56
Discharge: HOME | End: 2021-03-31
Payer: COMMERCIAL

## 2021-03-31 DIAGNOSIS — G63 POLYNEUROPATHY ASSOCIATED WITH UNDERLYING DISEASE (HCC): ICD-10-CM

## 2021-03-31 PROCEDURE — 95912 NRV CNDJ TEST 11-12 STUDIES: CPT

## 2021-03-31 PROCEDURE — 95886 MUSC TEST DONE W/N TEST COMP: CPT

## 2021-03-31 PROCEDURE — 95886 MUSC TEST DONE W/N TEST COMP: CPT | Performed by: PSYCHIATRY & NEUROLOGY

## 2021-03-31 PROCEDURE — 95912 NRV CNDJ TEST 11-12 STUDIES: CPT | Performed by: PSYCHIATRY & NEUROLOGY

## 2021-03-31 NOTE — PROCEDURES
[address]                      [address], [address] [facility]                             ELECTROMYOGRAM REPORT    PATIENT NAME: Claudia Guardado                  :        1965  MED REC NO:   [id number]                            ROOM:  ACCOUNT NO:   [de-identified]                           ADMIT DATE: 2021  PROVIDER:     Leonela Merchant MD    DATE OF EM2021    Neurophysiological studies are requested for the patient's underlying  numbness in the medial part of his feet with pain. This is only  occurring at night. Motor nerve conduction study of the right common peroneal nerve shows  normal peak latencies, normal amplitudes, and decreased conduction  velocity. Motor nerve conduction study of the left common peroneal  nerve shows prolonged latencies, low amplitudes, and decreased  conduction velocity. Right tibial motor nerve conduction study shows  normal amplitudes, latencies, and decreased conduction velocity. The  left tibial motor nerve conduction study shows normal amplitudes,  latencies, and decreased conduction velocity. The right sural nerve  conduction study is not recordable. The left sural nerve conduction  study shows prolonged latencies, low amplitudes, and decreased  conduction velocity. The right superficial peroneal not recordable. Left superficial peroneal nerve shows very low amplitudes, peak  latencies, though this does not appear to be reliable and likely not  recordable. The saphenous nerves are not recordable. Needle electrode  examination of the above-sampled muscles shows discrete units in the  left extensor digitorum brevis muscle with some minor changes in the  extensor digitorum brevis on the right and quadriceps. There is no  suggestion of active or chronic radiculopathies. IMPRESSION:  Mild early peripheral neuropathy, likely to be mostly  sensory. There is no suggestion of a large fiber neuropathy.   There is  no suggestion of any active denervation. Multiple sensory nerve  conduction studies are evaluated to rule out an artifact or temperature  differences. This test is personally performed and interpreted by me.         Ángel Baugh MD    D: 03/31/2021 10:37:36       T: 03/31/2021 10:44:32     [email or web address]  Job#: [id number]     Doc#: [id number]    CC:

## 2021-04-21 ENCOUNTER — OFFICE VISIT (OUTPATIENT)
Dept: NEUROLOGY | Age: 56
End: 2021-04-21
Payer: COMMERCIAL

## 2021-04-21 VITALS
BODY MASS INDEX: 27.11 KG/M2 | WEIGHT: 173.1 LBS | OXYGEN SATURATION: 97 % | DIASTOLIC BLOOD PRESSURE: 82 MMHG | HEART RATE: 95 BPM | SYSTOLIC BLOOD PRESSURE: 150 MMHG

## 2021-04-21 DIAGNOSIS — G59 MONONEUROPATHY DUE TO UNDERLYING DISEASE: Primary | ICD-10-CM

## 2021-04-21 DIAGNOSIS — G63 POLYNEUROPATHY ASSOCIATED WITH UNDERLYING DISEASE (HCC): ICD-10-CM

## 2021-04-21 DIAGNOSIS — R20.2 PARESTHESIAS: ICD-10-CM

## 2021-04-21 DIAGNOSIS — R20.0 NUMBNESS: ICD-10-CM

## 2021-04-21 PROCEDURE — G8419 CALC BMI OUT NRM PARAM NOF/U: HCPCS | Performed by: PSYCHIATRY & NEUROLOGY

## 2021-04-21 PROCEDURE — 1036F TOBACCO NON-USER: CPT | Performed by: PSYCHIATRY & NEUROLOGY

## 2021-04-21 PROCEDURE — 99214 OFFICE O/P EST MOD 30 MIN: CPT | Performed by: PSYCHIATRY & NEUROLOGY

## 2021-04-21 PROCEDURE — 3017F COLORECTAL CA SCREEN DOC REV: CPT | Performed by: PSYCHIATRY & NEUROLOGY

## 2021-04-21 PROCEDURE — G8427 DOCREV CUR MEDS BY ELIG CLIN: HCPCS | Performed by: PSYCHIATRY & NEUROLOGY

## 2021-04-21 RX ORDER — CEPHALEXIN 500 MG/1
CAPSULE ORAL
COMMUNITY
Start: 2021-04-08 | End: 2021-09-17

## 2021-04-21 RX ORDER — GABAPENTIN 300 MG/1
600 CAPSULE ORAL 2 TIMES DAILY
Qty: 120 CAPSULE | Refills: 1 | Status: SHIPPED | OUTPATIENT
Start: 2021-04-21 | End: 2022-09-16

## 2021-04-21 ASSESSMENT — ENCOUNTER SYMPTOMS
COLOR CHANGE: 0
PHOTOPHOBIA: 0
BACK PAIN: 0
SHORTNESS OF BREATH: 0
NAUSEA: 0
TROUBLE SWALLOWING: 0
CHOKING: 0
VOMITING: 0

## 2021-04-21 NOTE — PROGRESS NOTES
Subjective:      Patient ID: Alejandro Pleitez is a 48 y.o. male who presents today for:  Chief Complaint   Patient presents with    Follow-up     Pt states that his left foot is still getting sharp pain but other than that hes fine. He states he has no other concerns today. HPI 56-year right-handed gentleman with a history of only neuropathy. Patient has numbness in his hands and legs. We recommended an EMG has been done. It shows early peripheral neuropathy mostly small fiber. Also recommended evaluation with interventional radiology for venous distention. She does respond to gabapentin at night though now is having some pain in the mornings this is only in 1 foot in the arch. This may not be the just peripheral neuropathy.   Patient has not developed any weakness    Past Medical History:   Diagnosis Date    Acid reflux disease with ulcer 03/19/2018    Bilateral carpal tunnel syndrome 9/24/2020    Diabetes mellitus ([address]     BUCKLEY (dyspnea on exertion) 2/2/2018    Hypertension     Mixed hyperlipidemia 2/2/2018     Past Surgical History:   Procedure Laterality Date    CYSTOSCOPY Right 12/13/2017    CYSTOSCOPY RIGHT  RETROGRADE PYELOGRAM AND REMOVAL OF RIGHT DJ STENT (LABS DONE 11/27) performed by Kristen Krishnan MD at [facility] Right 11/8/2017    CYSTOSCOPY RIGHT DOUBLE J  STENT INSERTION performed by Kristen Krishnan MD at [address] [facility] FLX DX W/COLLJOANA [place] 1977 PFRMD N/A 3/19/2018    [facility] ESOPHAGOGASTRODUODENOSCOPY TRANSORAL DIAGNOSTIC N/A 3/19/2018    EGD ESOPHAGOGASTRODUODENOSCOPY performed by Rodrigo Stinson MD at [address] [place] [address] Marital status:      Spouse name: Not on file    Number of children: Not on file    Years of education: Not on file    Highest education level: Not tablet Take 1 tablet by mouth daily 30 tablet 3    fenofibrate (TRICOR) 145 MG tablet Take 1 tablet by mouth daily 30 tablet 3    lisinopril (PRINIVIL;ZESTRIL) 5 MG tablet take 1 tablet by mouth once daily 30 tablet 0    FreeStyle Lancets MISC 1 each by Does not apply route 3 times daily 100 each 5    atorvastatin (LIPITOR) 10 MG tablet take 1 tablet by mouth once daily 90 tablet 0    carvedilol (COREG) 6.25 MG tablet take 1 tablet by mouth twice a day with food 180 tablet 0    montelukast (SINGULAIR) 10 MG tablet take 1 tablet by mouth once daily 30 tablet 3    insulin regular (HUMULIN R) 100 UNIT/ML injection INJECT 12-15 UNITS AT [facility] MEAL 30 mL 3    BD INSULIN SYRINGE U/F 31G X 5/16\" 0.5 ML MISC use four times a day 200 each 3    Fluticasone Furoate-Vilanterol (BREO ELLIPTA) 200-25 MCG/INH AEPB inhalation 1 puff by mouth once daily 1 each 1    omeprazole (PRILOSEC) 40 MG delayed release capsule Take 1 capsule by mouth daily 30 capsule 11    aspirin 81 MG tablet Take 81 mg by mouth daily      albuterol sulfate HFA (PROAIR HFA) 108 (90 Base) MCG/ACT inhaler Inhale 2 puffs into the lungs every 6 hours as needed for Wheezing 1 Inhaler 3    Insulin Syringe-Needle U-100 31G X 5/16\" 1 ML MISC 1 each by Does not apply route 4 times daily 150 each 3    omega-3 acid ethyl esters (LOVAZA) 1 g capsule take 2 capsules by mouth twice a day (Patient not taking: Reported on 4/21/2021) 120 capsule 3    sildenafil (VIAGRA) 100 MG tablet Take 1 tablet by mouth as needed for Erectile Dysfunction (Patient not taking: Reported on 4/21/2021) 30 tablet 3     No current facility-administered medications for this visit. Review of Systems   Constitutional: Negative for fever. HENT: Negative for ear pain, tinnitus and trouble swallowing. Eyes: Negative for photophobia and visual disturbance. Respiratory: Negative for choking and shortness of breath. Cardiovascular: Negative for chest pain and palpitations. Gastrointestinal: Negative for nausea and vomiting. Musculoskeletal: Negative for back pain, gait problem, joint swelling, myalgias, neck pain and neck stiffness. Skin: Negative for color change. Allergic/Immunologic: Negative for food allergies. Neurological: Negative for dizziness, tremors, seizures, syncope, facial asymmetry, speech difficulty, weakness, light-headedness, numbness and headaches. Psychiatric/Behavioral: Negative for behavioral problems, confusion, hallucinations and sleep disturbance. Objective:   BP (!) 150/82 (Site: Left Upper Arm, Position: Sitting, Cuff Size: Medium Adult)   Pulse 95   Wt 173 lb 1.6 oz (78.5 kg)   SpO2 97%   BMI 27.11 kg/m²     Physical Exam  Vitals signs reviewed. Eyes:      Pupils: Pupils are equal, round, and reactive to light. Neck:      Musculoskeletal: Normal range of motion. Cardiovascular:      Rate and Rhythm: Normal rate and regular rhythm. Heart sounds: No murmur. Pulmonary:      Effort: Pulmonary effort is normal.      Breath sounds: Normal breath sounds. Abdominal:      General: Bowel sounds are normal.   Musculoskeletal: Normal range of motion. Skin:     General: Skin is warm. Neurological:      Mental Status: He is alert and oriented to person, place, and time. Cranial Nerves: No cranial nerve deficit. Sensory: No sensory deficit. Motor: No abnormal muscle tone. Coordination: Coordination normal.      Deep Tendon Reflexes: Reflexes are normal and symmetric. Babinski sign absent on the right side. Babinski sign absent on the left side. Psychiatric:         Mood and Affect: Mood normal.     Patient is areflexic in the lower extremity    No results found.     Lab Results   Component Value Date    WBC 7.5 10/21/2020    RBC 5.06 10/21/2020    HGB 15.3 10/21/2020    HCT 45.7 10/21/2020    MCV 90.3 10/21/2020    MCH 30.3 10/21/2020    MCHC 33.5 10/21/2020    RDW 13.4 10/21/2020     10/21/2020     Lab Results   Component Value Date     03/16/2021    K 4.3 03/16/2021     03/16/2021    CO2 21 03/16/2021    BUN 18 03/16/2021    CREATININE 1.30 03/16/2021    GFRAA >60.0 03/16/2021    LABGLOM 57.5 03/16/2021    GLUCOSE 164 03/23/2021    PROT 7.2 03/16/2021    LABALBU 4.2 03/16/2021    CALCIUM 9.1 03/16/2021    BILITOT 0.3 03/16/2021    ALKPHOS 80 03/16/2021    AST 28 03/16/2021    ALT 18 03/16/2021     No results found for: PROTIME, INR  No results found for: TSH, JOWVJRSP02, FOLATE, FERRITIN, IRON, TIBC, PTRFSAT, TSH, FREET4  Lab Results   Component Value Date    TRIG 261 10/21/2020    HDL 50 10/21/2020    LDLCALC 91 10/21/2020     No results found for: Kerri Ankit, LABBENZ, CANNAB, COCAINESCRN, LABMETH, OPIATESCREENURINE, PHENCYCLIDINESCREENURINE, PPXUR, ETOH  No results found for: LITHIUM, DILFRTOT, VALPROATE    Assessment:       Diagnosis Orders   1. Mononeuropathy due to underlying disease  Amb External Referral To Podiatry   2. Polyneuropathy associated with underlying disease ([address]     3. Paresthesias     4. Numbness       For neuropathy of small fiber type. His large fiber or oral intake evaluation was obtained with an EMG nerve conduction study which shows near normal findings. Patient may have some venous distention as well. 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 he has focal pain in the arch on the left which may not related to neuropathy. This may be an arch issue. Recommended that we increase his gabapentin during the day and refer him to podiatry.    Plan:      Orders Placed This Encounter   Procedures    Amb External Referral To Podiatry     Referral Priority:   Routine     Referral Type:   Eval and Treat     Referral Reason:   Specialty Services Required     Referred to Provider:   Dee Dee Perze DPM     Requested Specialty:   Podiatry     Number of Visits Requested:   1     Orders Placed This Encounter   Medications    gabapentin (NEURONTIN) 300 MG capsule     Sig: Take 2 capsules by mouth 2 times daily for 30 days. Dispense:  120 capsule     Refill:  1       No follow-ups on file.       Tami Clifford MD

## 2021-04-26 NOTE — TELEPHONE ENCOUNTER
Pharmacy called with a generic request for Test Strips, Meters and Lancets for Eloina Merle.       LOV 3/23/2021

## 2021-04-27 RX ORDER — BLOOD-GLUCOSE METER
1 EACH MISCELLANEOUS DAILY
Qty: 1 KIT | Refills: 0 | Status: SHIPPED | OUTPATIENT
Start: 2021-04-27 | End: 2022-09-16

## 2021-07-06 RX ORDER — PIOGLITAZONEHYDROCHLORIDE 15 MG/1
TABLET ORAL
Qty: 30 TABLET | Refills: 3 | Status: SHIPPED | OUTPATIENT
Start: 2021-07-06 | End: 2021-11-08

## 2021-07-06 RX ORDER — EMPAGLIFLOZIN 10 MG/1
TABLET, FILM COATED ORAL
Qty: 30 TABLET | Refills: 3 | Status: SHIPPED | OUTPATIENT
Start: 2021-07-06 | End: 2021-09-17 | Stop reason: SDUPTHER

## 2021-07-06 RX ORDER — FENOFIBRATE 145 MG/1
TABLET, COATED ORAL
Qty: 30 TABLET | Refills: 3 | Status: SHIPPED | OUTPATIENT
Start: 2021-07-06 | End: 2021-09-17

## 2021-08-31 PROBLEM — G58.9 MONONEUROPATHY, UNSPECIFIED: Status: ACTIVE | Noted: 2021-08-31

## 2021-08-31 PROBLEM — G59 MONONEUROPATHY DUE TO UNDERLYING DISEASE: Status: ACTIVE | Noted: 2021-08-31

## 2021-09-17 ENCOUNTER — OFFICE VISIT (OUTPATIENT)
Dept: ENDOCRINOLOGY | Age: 56
End: 2021-09-17
Payer: COMMERCIAL

## 2021-09-17 VITALS
HEIGHT: 67 IN | WEIGHT: 173 LBS | HEART RATE: 98 BPM | DIASTOLIC BLOOD PRESSURE: 88 MMHG | SYSTOLIC BLOOD PRESSURE: 124 MMHG | BODY MASS INDEX: 27.15 KG/M2 | OXYGEN SATURATION: 96 %

## 2021-09-17 DIAGNOSIS — N52.8 OTHER MALE ERECTILE DYSFUNCTION: ICD-10-CM

## 2021-09-17 DIAGNOSIS — E78.2 MIXED HYPERLIPIDEMIA: ICD-10-CM

## 2021-09-17 LAB
CHP ED QC CHECK: NORMAL
GLUCOSE BLD-MCNC: 250 MG/DL
HBA1C MFR BLD: 7.9 %

## 2021-09-17 PROCEDURE — 2022F DILAT RTA XM EVC RTNOPTHY: CPT | Performed by: PHYSICIAN ASSISTANT

## 2021-09-17 PROCEDURE — 1036F TOBACCO NON-USER: CPT | Performed by: PHYSICIAN ASSISTANT

## 2021-09-17 PROCEDURE — 3051F HG A1C>EQUAL 7.0%<8.0%: CPT | Performed by: PHYSICIAN ASSISTANT

## 2021-09-17 PROCEDURE — 3017F COLORECTAL CA SCREEN DOC REV: CPT | Performed by: PHYSICIAN ASSISTANT

## 2021-09-17 PROCEDURE — 83036 HEMOGLOBIN GLYCOSYLATED A1C: CPT | Performed by: PHYSICIAN ASSISTANT

## 2021-09-17 PROCEDURE — G8427 DOCREV CUR MEDS BY ELIG CLIN: HCPCS | Performed by: PHYSICIAN ASSISTANT

## 2021-09-17 PROCEDURE — 99213 OFFICE O/P EST LOW 20 MIN: CPT | Performed by: PHYSICIAN ASSISTANT

## 2021-09-17 PROCEDURE — 82962 GLUCOSE BLOOD TEST: CPT | Performed by: PHYSICIAN ASSISTANT

## 2021-09-17 PROCEDURE — G8419 CALC BMI OUT NRM PARAM NOF/U: HCPCS | Performed by: PHYSICIAN ASSISTANT

## 2021-09-17 RX ORDER — ATORVASTATIN CALCIUM 20 MG/1
20 TABLET, FILM COATED ORAL DAILY
Qty: 90 TABLET | Refills: 3 | Status: SHIPPED | OUTPATIENT
Start: 2021-09-17 | End: 2022-09-16

## 2021-09-17 RX ORDER — DULAGLUTIDE 3 MG/.5ML
3 INJECTION, SOLUTION SUBCUTANEOUS WEEKLY
Qty: 4 PEN | Refills: 3 | Status: SHIPPED | OUTPATIENT
Start: 2021-09-17 | End: 2022-01-28

## 2021-09-17 RX ORDER — TADALAFIL 20 MG/1
20 TABLET ORAL DAILY PRN
Qty: 30 TABLET | Refills: 1 | Status: SHIPPED | OUTPATIENT
Start: 2021-09-17 | End: 2022-09-16

## 2021-09-17 RX ORDER — SILDENAFIL 100 MG/1
100 TABLET, FILM COATED ORAL PRN
Qty: 30 TABLET | Refills: 3 | Status: CANCELLED | OUTPATIENT
Start: 2021-09-17

## 2021-09-17 RX ORDER — EMPAGLIFLOZIN 10 MG/1
TABLET, FILM COATED ORAL
Qty: 30 TABLET | Refills: 3 | Status: SHIPPED | OUTPATIENT
Start: 2021-09-17 | End: 2022-03-14 | Stop reason: SDUPTHER

## 2021-09-17 RX ORDER — OMEGA-3-ACID ETHYL ESTERS 1 G/1
CAPSULE, LIQUID FILLED ORAL
Qty: 120 CAPSULE | Refills: 3 | Status: SHIPPED | OUTPATIENT
Start: 2021-09-17 | End: 2022-03-14 | Stop reason: SDUPTHER

## 2021-09-17 RX ORDER — ATORVASTATIN CALCIUM 10 MG/1
TABLET, FILM COATED ORAL
Qty: 90 TABLET | Refills: 0 | Status: CANCELLED | OUTPATIENT
Start: 2021-09-17

## 2021-09-17 ASSESSMENT — ENCOUNTER SYMPTOMS
NAUSEA: 0
SORE THROAT: 0
RHINORRHEA: 0
EYE PAIN: 0
SINUS PRESSURE: 0
VOMITING: 0
ABDOMINAL PAIN: 0
EYE REDNESS: 0
DIARRHEA: 0
COUGH: 0
WHEEZING: 0
SHORTNESS OF BREATH: 0

## 2021-09-17 NOTE — PROGRESS NOTES
Assessment:       Diagnosis Orders   1. Uncontrolled type 2 diabetes mellitus with complication, with long-term current use of insulin ([facility])  POCT Glucose    Comprehensive Metabolic Panel    Hemoglobin A1C    POCT glycosylated hemoglobin (Hb A1C)    Testosterone Free and Total Male    Prolactin    omega-3 acid ethyl esters (LOVAZA) 1 g capsule   2. Mixed hyperlipidemia     3. Other male erectile dysfunction  Testosterone Free and Total Male    Prolactin     Average blood glucose 100-150 without significant hyper or hypoglycemic events    PLAN:     1. Insulin only if glucose > 150 (only needed twice in last 2 weeks)   2. Continue Actos 15 mg by mouth daily  3. Continue Jardiance 10 mg daily   4. Increase Trulicity 3.0 mg weekly  5. Continue Metformin 500 mg twice a day    6. Sildenafil 100 mg p.o. daily as needed  7. Atorvastatin 20 mg nightly   8. Lovaza 4 capsules daily   9. Repeat labs and follow up in 3 months   10.    Orders Placed This Encounter   Procedures    Comprehensive Metabolic Panel     Standing Status:   Future     Standing Expiration Date:   9/17/2022    Hemoglobin A1C     Standing Status:   Future     Standing Expiration Date:   9/17/2022    Testosterone Free and Total Male     Standing Status:   Future     Standing Expiration Date:   9/17/2022    Prolactin     Standing Status:   Future     Standing Expiration Date:   9/17/2022    POCT Glucose    POCT glycosylated hemoglobin (Hb A1C)     Orders Placed This Encounter   Medications    empagliflozin (JARDIANCE) 10 MG tablet     Sig: take 1 tablet by mouth once daily     Dispense:  30 tablet     Refill:  3    Dulaglutide (TRULICITY) 3 KS/8.7HW SOPN     Sig: Inject 3 mg into the skin once a week     Dispense:  4 pen     Refill:  3    tadalafil (CIALIS) 20 MG tablet     Sig: Take 1 tablet by mouth daily as needed for Erectile Dysfunction     Dispense:  30 tablet     Refill:  1    atorvastatin (LIPITOR) 20 MG tablet     Sig: Take 1 tablet by mouth daily     Dispense:  90 tablet     Refill:  3    omega-3 acid ethyl esters (LOVAZA) 1 g capsule     Sig: take 2 capsules by mouth twice a day     Dispense:  120 capsule     Refill:  3       Return in about 3 months (around 12/17/2021) for Diabetes. Subjective:     Chief Complaint   Patient presents with    Diabetes     Vitals:    09/17/21 1416 09/17/21 1426   BP: (!) 145/89 124/88   Site: Right Upper Arm    Pulse: 98    SpO2: 96%    Weight: 173 lb (78.5 kg)    Height: 5' 7\" (1.702 m)      Wt Readings from Last 3 Encounters:   09/17/21 173 lb (78.5 kg)   04/21/21 173 lb 1.6 oz (78.5 kg)   03/23/21 173 lb (78.5 kg)     BP Readings from Last 3 Encounters:   09/17/21 124/88   04/21/21 (!) 150/82   03/23/21 101/72             Diabetes  He presents for his follow-up diabetic visit. He has type 2 diabetes mellitus. The initial diagnosis of diabetes was made 5 years ago. His disease course has been stable. Hypoglycemia symptoms include sweats and tremors (With hypoglycemia). Pertinent negatives for hypoglycemia include no dizziness or headaches. There are no diabetic associated symptoms. Pertinent negatives for diabetes include no chest pain, no fatigue, no polydipsia and no polyuria. There are no hypoglycemic complications. There are no diabetic complications. Risk factors for coronary artery disease include dyslipidemia, male sex and hypertension. Current diabetic treatment includes insulin injections and oral agent (dual therapy). He is compliant with treatment all of the time. He is following a generally healthy diet. Meal planning includes avoidance of concentrated sweets and carbohydrate counting. He rarely participates in exercise. His home blood glucose trend is decreasing steadily. His overall blood glucose range is 140-180 mg/dl. An ACE inhibitor/angiotensin II receptor blocker is not being taken. He does not see a podiatrist.Eye exam is current.        Past Medical History:   Diagnosis Date    Acid reflux (90 Base) MCG/ACT inhaler, Inhale 2 puffs into the lungs every 6 hours as needed for Wheezing, Disp: 1 Inhaler, Rfl: 3    Insulin Syringe-Needle U-100 31G X 5/16\" 1 ML MISC, 1 each by Does not apply route 4 times daily, Disp: 150 each, Rfl: 3    carvedilol (COREG) 6.25 MG tablet, take 1 tablet by mouth twice a day with food (Patient not taking: Reported on 9/17/2021), Disp: 180 tablet, Rfl: 0    sildenafil (VIAGRA) 100 MG tablet, Take 1 tablet by mouth as needed for Erectile Dysfunction (Patient not taking: Reported on 9/17/2021), Disp: 30 tablet, Rfl: 3    omeprazole (PRILOSEC) 40 MG delayed release capsule, Take 1 capsule by mouth daily (Patient not taking: Reported on 9/17/2021), Disp: 30 capsule, Rfl: 11  Lab Results   Component Value Date     03/16/2021    K 4.3 03/16/2021     03/16/2021    CO2 21 03/16/2021    BUN 18 03/16/2021    CREATININE 1.30 (H) 03/16/2021    GLUCOSE 250 09/17/2021    CALCIUM 9.1 03/16/2021    PROT 7.2 03/16/2021    LABALBU 4.2 03/16/2021    BILITOT 0.3 03/16/2021    ALKPHOS 80 03/16/2021    AST 28 03/16/2021    ALT 18 03/16/2021    LABGLOM 57.5 (L) 03/16/2021    GFRAA >60.0 03/16/2021    GLOB 3.0 03/16/2021     Lab Results   Component Value Date    WBC 7.5 10/21/2020    HGB 15.3 10/21/2020    HCT 45.7 10/21/2020    MCV 90.3 10/21/2020     10/21/2020     Lab Results   Component Value Date    LABA1C 7.9 09/17/2021    LABA1C 7.9 (H) 03/16/2021    LABA1C 7.6 12/22/2020     Lab Results   Component Value Date    CHOL 193 10/21/2020    CHOL 145 12/16/2019    CHOL 187 03/25/2019      Lab Results   Component Value Date    TRIG 261 (H) 10/21/2020    TRIG 218 (H) 12/16/2019    TRIG 432 (H) 03/25/2019     Lab Results   Component Value Date    HDL 50 10/21/2020    HDL 52 12/16/2019    HDL 31 (L) 03/25/2019     Lab Results   Component Value Date    LDLCALC 91 10/21/2020    LDLCALC 49 12/16/2019    LDLCALC see below 03/25/2019     No results found for: LABVLDL, VLDL  No results found for: CHOLHDLRATIO  No results found for: TESTOSTERONE  No results found for: TSH, K6LNPDT, N0USTNH, THYROIDAB        No Known Allergies    Past Surgical History:   Procedure Laterality Date    CYSTOSCOPY Right 12/13/2017    CYSTOSCOPY RIGHT  RETROGRADE PYELOGRAM AND REMOVAL OF RIGHT DJ STENT (LABS DONE 11/27) performed by Pablo Carlos MD at [address] / [place] / [place] [place] Right 11/8/2017    CYSTOSCOPY RIGHT DOUBLE J  STENT INSERTION performed by Pablo Carlos MD at [address] [facility] [place] FLX DX W/COLLJ [address] 1977 PFRMD N/A 3/19/2018    COLONOSCOPY performed by Leatha Cabrales MD at [facility] ESOPHAGOGASTRODUODENOSCOPY TRANSORAL DIAGNOSTIC N/A 3/19/2018    EGD ESOPHAGOGASTRODUODENOSCOPY performed by Leatha Cabrales MD at [address] [place] [address] Marital status:      Spouse name: Not on file    Number of children: Not on file    Years of education: Not on file    Highest education level: Not on file   Occupational History    Not on file   Tobacco Use    Smoking status: Never Smoker    Smokeless tobacco: Never Used   Substance and Sexual Activity    Alcohol use: Yes     Comment: occasional    Drug use: No    Sexual activity: Not on file   Other Topics Concern    Not on file   Social History Narrative    Not on file     Social Determinants of Health     Financial Resource Strain:     Difficulty of Paying Living Expenses:    Food Insecurity:     Worried About Running Out of Food in the Last Year:     [address] in the Last Year:    Transportation Needs:     Lack of Transportation (Medical):      Lack of Transportation (Non-Medical):    Physical Activity:     Days of Exercise per Week:     Minutes of Exercise per Session:    Stress:     Feeling of Stress :    Social Connections:     Frequency of Communication with Friends and Family:     Frequency of Social Gatherings with Friends and Family:     Attends Mu-ism Services:     Active Member of Clubs or Organizations:     Attends Club or Organization Meetings:     Marital Status:    Intimate Partner Violence:     Fear of Current or Ex-Partner:     Emotionally Abused:     Physically Abused:     Sexually Abused:      Family History   Problem Relation Age of Onset    Hypertension Father     Heart Disease Father          Review of Systems   Constitutional: Negative for chills, fatigue and fever. HENT: Negative for congestion, ear pain, postnasal drip, rhinorrhea, sinus pressure and sore throat. Eyes: Negative for pain and redness. Respiratory: Negative for cough, shortness of breath and wheezing. Cardiovascular: Negative for chest pain, palpitations and leg swelling. Gastrointestinal: Negative for abdominal pain, diarrhea, nausea and vomiting. Endocrine: Negative for cold intolerance, heat intolerance, polydipsia and polyuria. Genitourinary: Negative for difficulty urinating. Musculoskeletal: Negative for arthralgias. Skin: Negative for rash. Allergic/Immunologic: Negative for food allergies. Neurological: Positive for tremors (With hypoglycemia). Negative for dizziness and headaches. Peripheral neuropathy   Hematological: Negative for adenopathy. Psychiatric/Behavioral: Negative for agitation. Objective:   Physical Exam  Constitutional:       Appearance: He is well-developed. HENT:      Head: Normocephalic and atraumatic. Nose: No rhinorrhea. Mouth/Throat:      Mouth: Mucous membranes are moist.   Eyes:      Conjunctiva/sclera: Conjunctivae normal.   Neck:      Thyroid: No thyromegaly. Cardiovascular:      Rate and Rhythm: Normal rate and regular rhythm. Heart sounds: Normal heart sounds. No murmur heard. Pulmonary:      Effort: Pulmonary effort is normal.      Breath sounds: Normal breath sounds. No wheezing.    Abdominal:      General: Bowel sounds are normal. There is no distension. Palpations: Abdomen is soft. Tenderness: There is no abdominal tenderness. Musculoskeletal:         General: No swelling or tenderness. Normal range of motion. Cervical back: Normal range of motion and neck supple. Lymphadenopathy:      Cervical: No cervical adenopathy. Skin:     General: Skin is warm and dry. Neurological:      Mental Status: He is alert and oriented to person, place, and time.    Psychiatric:         Mood and Affect: Mood normal.

## 2021-09-17 NOTE — PATIENT INSTRUCTIONS
Endocrinology-diabetes    1. Check your blood sugars 4 times a day, before meals and at night  2. Document these numbers and a blood glucose log and bring them with you to your follow-up appointment. 3. Call our office if you have blood sugars less than 80 or greater then 200 on two or more occasions  4. Call our office if you have any questions regarding your blood sugars or insulin dosing regiment  5. Signs of low blood sugar include sweating , heart racing, dizziness and weakness. Check your blood sugar if you have any of these symptoms. 6. Schedule Eye exam this year     Medications-  1. Insulin only if glucose > 150 (only needed twice in last 2 weeks)   2. Continue Actos 15 mg by mouth daily  3. Continue Jardiance 10 mg daily   4. Increase Trulicity 3.0 mg weekly  5. Continue Metformin 500 mg twice a day    6. Sildenafil 100 mg p.o. daily as needed  7. Atorvastatin 20 mg nightly   8. Lovaza 4 capsules daily   9.  Repeat labs 2 weeks before your follow up and follow up in 3 months

## 2021-11-08 RX ORDER — FENOFIBRATE 145 MG/1
TABLET, COATED ORAL
Qty: 120 TABLET | Refills: 3 | Status: SHIPPED | OUTPATIENT
Start: 2021-11-08 | End: 2022-09-16

## 2021-11-08 RX ORDER — PIOGLITAZONEHYDROCHLORIDE 15 MG/1
TABLET ORAL
Qty: 120 TABLET | Refills: 3 | Status: SHIPPED | OUTPATIENT
Start: 2021-11-08 | End: 2022-03-14 | Stop reason: ALTCHOICE

## 2021-11-08 NOTE — TELEPHONE ENCOUNTER
Pharmacy  requesting medication refill.  Please approve or deny this request.    Rx requested:  Requested Prescriptions     Pending Prescriptions Disp Refills    pioglitazone (ACTOS) 15 MG tablet [Pharmacy Med Name: PIOGLITAZONE HCL 15 MG TABLET] 120 tablet      Sig: take 1 tablet by mouth once daily    metFORMIN (GLUCOPHAGE) 500 MG tablet [Pharmacy Med Name: METFORMIN  MG TABLET] 240 tablet      Sig: take 1 tablet by mouth twice a day with food    fenofibrate (TRICOR) 145 MG tablet [Pharmacy Med Name: FENOFIBRATE 145 MG TABLET] 120 tablet      Sig: take 1 tablet by mouth once daily         Last Office Visit:   9/17/2021      Next Visit Date:  Future Appointments   Date Time Provider Lo Campbell   12/17/2021  2:00 PM Kathy Casanova, [address]

## 2021-12-06 NOTE — PROGRESS NOTES
Subjective:      Patient ID: King Lion is a 47 y.o. male. Three-month follow-up  Diabetes   He presents for his follow-up diabetic visit. Associated symptoms include visual change. Diabetic complications include nephropathy and retinopathy. Risk factors for coronary artery disease include diabetes mellitus and male sex. Current diabetic treatment includes insulin injections (Novolin N and R). He is currently taking insulin pre-breakfast, pre-lunch, pre-dinner and at bedtime. Frequency home blood tests: 3 times a day. His overall blood glucose range is >200 mg/dl. (Lab Results       Component                Value               Date                       LABA1C                   9.5                 12/07/2018              ) Eye exam is current ( [facility] ). reviewed labs     Results for Zoya Manriquez (MRN [id number]) as of 12/16/2018 11:32   Ref.  Range 9/7/2018 10:26   Sodium Latest Ref Range: 132 - 144 mEq/L 138   Potassium Latest Ref Range: 3.5 - 5.1 mEq/L 5.4 (H)   Chloride Latest Ref Range: 98 - 107 mEq/L 100   CO2 Latest Ref Range: 22 - 29 mEq/L 26   BUN Latest Ref Range: 6 - 20 mg/dL 14   Creatinine Latest Ref Range: 0.70 - 1.20 mg/dL 1.10   Anion Gap Latest Ref Range: 7 - 13 mEq/L 12   GFR Non- Latest Ref Range: >60  >60.0   GFR African American Latest Ref Range: >60  >60.0   Glucose Latest Ref Range: 74 - 109 mg/dL 197 (H)   Calcium Latest Ref Range: 8.6 - 10.2 mg/dL 9.4         Patient Active Problem List   Diagnosis    Acute pyelonephritis    Sepsis ([facility]    Uncontrolled type 2 diabetes mellitus with complication, with long-term current use of insulin ([facility])    MSSA (methicillin susceptible Staphylococcus aureus) infection    Hydronephrosis with urinary obstruction due to ureteral calculus    Staphylococcus aureus bacteremia with sepsis [facility]    Osteomyelitis of cervical spine ([facility])    Discitis of cervical region    Pyelonephritis    Urethral DASH Diet

## 2022-01-28 RX ORDER — DULAGLUTIDE 3 MG/.5ML
INJECTION, SOLUTION SUBCUTANEOUS
Qty: 2 ML | Refills: 3 | Status: SHIPPED | OUTPATIENT
Start: 2022-01-28 | End: 2022-03-14 | Stop reason: ALTCHOICE

## 2022-03-14 ENCOUNTER — OFFICE VISIT (OUTPATIENT)
Dept: ENDOCRINOLOGY | Age: 57
End: 2022-03-14
Payer: COMMERCIAL

## 2022-03-14 VITALS
DIASTOLIC BLOOD PRESSURE: 91 MMHG | WEIGHT: 170 LBS | SYSTOLIC BLOOD PRESSURE: 119 MMHG | HEIGHT: 67 IN | HEART RATE: 94 BPM | BODY MASS INDEX: 26.68 KG/M2

## 2022-03-14 LAB
CHP ED QC CHECK: NORMAL
GLUCOSE BLD-MCNC: 149 MG/DL
HBA1C MFR BLD: 7.8 %

## 2022-03-14 PROCEDURE — 99213 OFFICE O/P EST LOW 20 MIN: CPT | Performed by: PHYSICIAN ASSISTANT

## 2022-03-14 PROCEDURE — 83036 HEMOGLOBIN GLYCOSYLATED A1C: CPT | Performed by: PHYSICIAN ASSISTANT

## 2022-03-14 PROCEDURE — 3051F HG A1C>EQUAL 7.0%<8.0%: CPT | Performed by: PHYSICIAN ASSISTANT

## 2022-03-14 PROCEDURE — 2022F DILAT RTA XM EVC RTNOPTHY: CPT | Performed by: PHYSICIAN ASSISTANT

## 2022-03-14 PROCEDURE — G8428 CUR MEDS NOT DOCUMENT: HCPCS | Performed by: PHYSICIAN ASSISTANT

## 2022-03-14 PROCEDURE — G8484 FLU IMMUNIZE NO ADMIN: HCPCS | Performed by: PHYSICIAN ASSISTANT

## 2022-03-14 PROCEDURE — 3017F COLORECTAL CA SCREEN DOC REV: CPT | Performed by: PHYSICIAN ASSISTANT

## 2022-03-14 PROCEDURE — 1036F TOBACCO NON-USER: CPT | Performed by: PHYSICIAN ASSISTANT

## 2022-03-14 PROCEDURE — G8419 CALC BMI OUT NRM PARAM NOF/U: HCPCS | Performed by: PHYSICIAN ASSISTANT

## 2022-03-14 PROCEDURE — 82962 GLUCOSE BLOOD TEST: CPT | Performed by: PHYSICIAN ASSISTANT

## 2022-03-14 RX ORDER — EMPAGLIFLOZIN 10 MG/1
TABLET, FILM COATED ORAL
Qty: 30 TABLET | Refills: 3 | Status: SHIPPED | OUTPATIENT
Start: 2022-03-14 | End: 2022-07-20

## 2022-03-14 RX ORDER — OMEGA-3-ACID ETHYL ESTERS 1 G/1
CAPSULE, LIQUID FILLED ORAL
Qty: 120 CAPSULE | Refills: 3 | Status: SHIPPED | OUTPATIENT
Start: 2022-03-14

## 2022-03-14 RX ORDER — PIOGLITAZONEHYDROCHLORIDE 30 MG/1
30 TABLET ORAL DAILY
Qty: 30 TABLET | Refills: 3 | Status: SHIPPED | OUTPATIENT
Start: 2022-03-14 | End: 2022-07-20

## 2022-03-14 RX ORDER — DULAGLUTIDE 4.5 MG/.5ML
4.5 INJECTION, SOLUTION SUBCUTANEOUS WEEKLY
Qty: 4 PEN | Refills: 3 | Status: SHIPPED | OUTPATIENT
Start: 2022-03-14 | End: 2022-08-08

## 2022-03-14 ASSESSMENT — ENCOUNTER SYMPTOMS
SINUS PRESSURE: 0
EYE REDNESS: 0
COUGH: 0
EYE PAIN: 0
RHINORRHEA: 0
SHORTNESS OF BREATH: 0
NAUSEA: 0
VOMITING: 0
WHEEZING: 0
SORE THROAT: 0
ABDOMINAL PAIN: 0
DIARRHEA: 0

## 2022-03-14 NOTE — PROGRESS NOTES
Assessment:       Diagnosis Orders   1. Uncontrolled type 2 diabetes mellitus with complication, with long-term current use of insulin (HCC)  Comprehensive Metabolic Panel    Hemoglobin A1C    POCT Glucose    POCT glycosylated hemoglobin (Hb A1C)    Lipid Panel    omega-3 acid ethyl esters (LOVAZA) 1 g capsule    blood glucose test strips (ASCENSIA AUTODISC VI;ONE TOUCH ULTRA TEST VI) strip    Comprehensive Metabolic Panel    Microalbumin / Creatinine Urine Ratio     Average blood glucose 100-150 without significant hyper or hypoglycemic events    PLAN:     1. Insulin only if glucose > 150 (only needed twice in last 2 weeks)   2. Increase pioglitizone/ Actos 30 mg by mouth daily  3. Continue Jardiance 10 mg daily   4. Increase Trulicity 4.5 mg weekly  5. Continue Metformin 500 mg twice a day    6. Sildenafil 100 mg p.o. daily as needed  7. Atorvastatin 20 mg nightly   8. Lovaza 4 capsules daily   9. Labs today   10.  Repeat labs and follow up in 3 months       Orders Placed This Encounter   Procedures    Comprehensive Metabolic Panel     Standing Status:   Future     Standing Expiration Date:   3/14/2023    Hemoglobin A1C     Standing Status:   Future     Standing Expiration Date:   3/14/2023    Lipid Panel     Standing Status:   Future     Standing Expiration Date:   3/14/2023     Order Specific Question:   Is Patient Fasting?/# of Hours     Answer:   10-12    Comprehensive Metabolic Panel     Standing Status:   Future     Standing Expiration Date:   3/14/2023    Microalbumin / Creatinine Urine Ratio     Standing Status:   Future     Standing Expiration Date:   3/14/2023    POCT Glucose    POCT glycosylated hemoglobin (Hb A1C)     Orders Placed This Encounter   Medications    Dulaglutide (TRULICITY) 4.5 VH/6.9YL SOPN     Sig: Inject 4.5 mg into the skin once a week     Dispense:  4 pen     Refill:  3    pioglitazone (ACTOS) 30 MG tablet     Sig: Take 1 tablet by mouth daily     Dispense:  30 tablet Refill:  3    empagliflozin (JARDIANCE) 10 MG tablet     Sig: take 1 tablet by mouth once daily     Dispense:  30 tablet     Refill:  3    omega-3 acid ethyl esters (LOVAZA) 1 g capsule     Sig: take 2 capsules by mouth twice a day     Dispense:  120 capsule     Refill:  3    blood glucose test strips (ASCENSIA AUTODISC VI;ONE TOUCH ULTRA TEST VI) strip     Sig: Patient test 3x a day. Dispense:  100 each     Refill:  3       Return in about 3 months (around 6/14/2022) for Diabetes. Subjective:     Chief Complaint   Patient presents with    Follow-up    Diabetes     Vitals:    03/14/22 1427 03/14/22 1441   BP: (!) 148/111 (!) 119/91   Site: Right Upper Arm    Pulse: 94    Weight: 170 lb (77.1 kg)    Height: 5' 7\" (1.702 m)      Wt Readings from Last 3 Encounters:   03/14/22 170 lb (77.1 kg)   09/17/21 173 lb (78.5 kg)   04/21/21 173 lb 1.6 oz (78.5 kg)     BP Readings from Last 3 Encounters:   03/14/22 (!) 119/91   09/17/21 124/88   04/21/21 (!) 150/82             Diabetes  He presents for his follow-up diabetic visit. He has type 2 diabetes mellitus. The initial diagnosis of diabetes was made 5 years ago. His disease course has been stable. Hypoglycemia symptoms include sweats and tremors (With hypoglycemia). Pertinent negatives for hypoglycemia include no dizziness or headaches. There are no diabetic associated symptoms. Pertinent negatives for diabetes include no chest pain, no fatigue, no polydipsia and no polyuria. There are no hypoglycemic complications. There are no diabetic complications. Risk factors for coronary artery disease include dyslipidemia, male sex and hypertension. Current diabetic treatment includes insulin injections and oral agent (dual therapy). He is compliant with treatment all of the time. He is following a generally healthy diet. Meal planning includes avoidance of concentrated sweets and carbohydrate counting. He rarely participates in exercise.  His home blood glucose trend is decreasing steadily. His overall blood glucose range is 140-180 mg/dl. An ACE inhibitor/angiotensin II receptor blocker is not being taken. He does not see a podiatrist.Eye exam is current. Past Medical History:   Diagnosis Date    Acid reflux disease with ulcer 03/19/2018    Bilateral carpal tunnel syndrome 9/24/2020    Diabetes mellitus ([facility] [address]     BUCKLEY (dyspnea on exertion) 2/2/2018    Hypertension     Mixed hyperlipidemia 2/2/2018       Current Outpatient Medications:     Dulaglutide (TRULICITY) 4.5 IP/7.2JL SOPN, Inject 4.5 mg into the skin once a week, Disp: 4 pen, Rfl: 3    pioglitazone (ACTOS) 30 MG tablet, Take 1 tablet by mouth daily, Disp: 30 tablet, Rfl: 3    empagliflozin (JARDIANCE) 10 MG tablet, take 1 tablet by mouth once daily, Disp: 30 tablet, Rfl: 3    omega-3 acid ethyl esters (LOVAZA) 1 g capsule, take 2 capsules by mouth twice a day, Disp: 120 capsule, Rfl: 3    blood glucose test strips (ASCENSIA AUTODISC VI;ONE TOUCH ULTRA TEST VI) strip, Patient test 3x a day., Disp: 100 each, Rfl: 3    metFORMIN (GLUCOPHAGE) 500 MG tablet, take 1 tablet by mouth twice a day with food, Disp: 240 tablet, Rfl: 3    fenofibrate (TRICOR) 145 MG tablet, take 1 tablet by mouth once daily, Disp: 120 tablet, Rfl: 3    tadalafil (CIALIS) 20 MG tablet, Take 1 tablet by mouth daily as needed for Erectile Dysfunction, Disp: 30 tablet, Rfl: 1    atorvastatin (LIPITOR) 20 MG tablet, Take 1 tablet by mouth daily, Disp: 90 tablet, Rfl: 3    Blood Glucose Monitoring Suppl (ONE TOUCH ULTRA 2) w/Device KIT, 1 kit by Does not apply route daily, Disp: 1 kit, Rfl: 0    gabapentin (NEURONTIN) 300 MG capsule, Take 2 capsules by mouth 2 times daily for 30 days. , Disp: 120 capsule, Rfl: 1    insulin regular (HUMULIN R) 100 UNIT/ML injection, INJECT 12-15 UNITS AT [facility] MEAL, Disp: 30 mL, Rfl: 3    BD INSULIN SYRINGE U/F 31G X 5/16\" 0.5 ML MISC, use four times a day, Disp: 200 each, Rfl: 3    Fluticasone Furoate-Vilanterol (BREO ELLIPTA) 200-25 MCG/INH AEPB, inhalation 1 puff by mouth once daily, Disp: 1 each, Rfl: 1    omeprazole (PRILOSEC) 40 MG delayed release capsule, Take 1 capsule by mouth daily, Disp: 30 capsule, Rfl: 11    albuterol sulfate HFA (PROAIR HFA) 108 (90 Base) MCG/ACT inhaler, Inhale 2 puffs into the lungs every 6 hours as needed for Wheezing, Disp: 1 Inhaler, Rfl: 3    Insulin Syringe-Needle U-100 31G X 5/16\" 1 ML MISC, 1 each by Does not apply route 4 times daily, Disp: 150 each, Rfl: 3    carvedilol (COREG) 6.25 MG tablet, take 1 tablet by mouth twice a day with food (Patient not taking: Reported on 9/17/2021), Disp: 180 tablet, Rfl: 0  Lab Results   Component Value Date     03/16/2021    K 4.3 03/16/2021     03/16/2021    CO2 21 03/16/2021    BUN 18 03/16/2021    CREATININE 1.30 (H) 03/16/2021    GLUCOSE 250 09/17/2021    CALCIUM 9.1 03/16/2021    PROT 7.2 03/16/2021    LABALBU 4.2 03/16/2021    BILITOT 0.3 03/16/2021    ALKPHOS 80 03/16/2021    AST 28 03/16/2021    ALT 18 03/16/2021    LABGLOM 57.5 (L) 03/16/2021    GFRAA >60.0 03/16/2021    GLOB 3.0 03/16/2021     Lab Results   Component Value Date    WBC 7.5 10/21/2020    HGB 15.3 10/21/2020    HCT 45.7 10/21/2020    MCV 90.3 10/21/2020     10/21/2020     Lab Results   Component Value Date    LABA1C 7.9 09/17/2021    LABA1C 7.9 (H) 03/16/2021    LABA1C 7.6 12/22/2020     Lab Results   Component Value Date    CHOL 193 10/21/2020    CHOL 145 12/16/2019    CHOL 187 03/25/2019      Lab Results   Component Value Date    TRIG 261 (H) 10/21/2020    TRIG 218 (H) 12/16/2019    TRIG 432 (H) 03/25/2019     Lab Results   Component Value Date    HDL 50 10/21/2020    HDL 52 12/16/2019    HDL 31 (L) 03/25/2019     Lab Results   Component Value Date    LDLCALC 91 10/21/2020    LDLCALC 49 12/16/2019    [address] see below 03/25/2019     No results found for: LABVLDL, VLDL  No results found for: CHOLHDLRATIO  No results found for: TESTOSTERONE  No results found for: TSH, S8QEDIN, S8RFUAT, THYROIDAB        No Known Allergies    Past Surgical History:   Procedure Laterality Date    CYSTOSCOPY Right 12/13/2017    CYSTOSCOPY RIGHT  RETROGRADE PYELOGRAM AND REMOVAL OF RIGHT DJ STENT (LABS DONE 11/27) performed by Rachel Kramer MD at [facility] / Nicole Palmer / Sita Bhakta Right 11/8/2017    CYSTOSCOPY RIGHT DOUBLE J  STENT INSERTION performed by Rachel Kramer MD at [facility] [place] FLX DX W/COLLJ [address] [place] [address] WHEN PFRMD N/A 3/19/2018    COLONOSCOPY performed by Minna Hawkins MD at [address] ESOPHAGOGASTRODUODENOSCOPY TRANSORAL DIAGNOSTIC N/A 3/19/2018    EGD ESOPHAGOGASTRODUODENOSCOPY performed by Minna Hawkins MD at [address] [place] Marital status:      Spouse name: Not on file    Number of children: Not on file    Years of education: Not on file    Highest education level: Not on file   Occupational History    Not on file   Tobacco Use    Smoking status: Never Smoker    Smokeless tobacco: Never Used   Substance and Sexual Activity    Alcohol use: Yes     Comment: occasional    Drug use: No    Sexual activity: Not on file   Other Topics Concern    Not on file   Social History Narrative    Not on file     Social Determinants of Health     Financial Resource Strain:     Difficulty of Paying Living Expenses: Not on file   Food Insecurity:     Worried About Running Out of Food in the Last Year: Not on file    Greta of Food in the Last Year: Not on file   Transportation Needs:     Lack of Transportation (Medical): Not on file    Lack of Transportation (Non-Medical):  Not on file   Physical Activity:     Days of Exercise per Week: Not on file    Minutes of Exercise per Session: Not on file   Stress:     Feeling of Stress : Not on file   Social Connections:     Frequency of Communication with Friends and Family: Not on file    Frequency of Social Gatherings with Friends and Family: Not on file    Attends Restoration Services: Not on file    Active Member of Clubs or Organizations: Not on file    Attends Club or Organization Meetings: Not on file    Marital Status: Not on file   Intimate Partner Violence:     Fear of Current or Ex-Partner: Not on file    Emotionally Abused: Not on file    Physically Abused: Not on file    Sexually Abused: Not on file   Housing Stability:     Unable to Pay for Housing in the Last Year: Not on file    Number of [place] in the Last Year: Not on file    Unstable Housing in the Last Year: Not on file     Family History   Problem Relation Age of Onset    Hypertension Father     Heart Disease Father          Review of Systems   Constitutional: Negative for chills, fatigue and fever. HENT: Negative for congestion, ear pain, postnasal drip, rhinorrhea, sinus pressure and sore throat. Eyes: Negative for pain and redness. Respiratory: Negative for cough, shortness of breath and wheezing. Cardiovascular: Negative for chest pain, palpitations and leg swelling. Gastrointestinal: Negative for abdominal pain, diarrhea, nausea and vomiting. Endocrine: Negative for cold intolerance, heat intolerance, polydipsia and polyuria. Genitourinary: Negative for difficulty urinating. Musculoskeletal: Negative for arthralgias. Skin: Negative for rash. Allergic/Immunologic: Negative for food allergies. Neurological: Positive for tremors (With hypoglycemia). Negative for dizziness and headaches. Peripheral neuropathy   Hematological: Negative for adenopathy. Psychiatric/Behavioral: Negative for agitation. Objective:   Physical Exam  Constitutional:       Appearance: He is well-developed. HENT:      Head: Normocephalic and atraumatic. Nose: No congestion.       Mouth/Throat:      Mouth: Mucous membranes are moist.   Eyes: Conjunctiva/sclera: Conjunctivae normal.   Neck:      Thyroid: No thyromegaly. Comments: No thyromegaly or palpable nodules   Cardiovascular:      Rate and Rhythm: Normal rate and regular rhythm. Heart sounds: Normal heart sounds. No murmur heard. Pulmonary:      Effort: Pulmonary effort is normal.      Breath sounds: Normal breath sounds. No wheezing. Abdominal:      General: Bowel sounds are normal. There is no distension. Palpations: Abdomen is soft. Tenderness: There is no abdominal tenderness. Musculoskeletal:         General: No swelling. Normal range of motion. Cervical back: Normal range of motion and neck supple. Lymphadenopathy:      Cervical: No cervical adenopathy. Skin:     General: Skin is warm and dry. Neurological:      Mental Status: He is alert and oriented to person, place, and time.    Psychiatric:         Mood and Affect: Mood normal.         Behavior: Behavior normal.

## 2022-03-14 NOTE — PATIENT INSTRUCTIONS
Endocrinology-diabetes    1. Check your blood sugars 4 times a day, before meals and at night  2. Document these numbers and a blood glucose log and bring them with you to your follow-up appointment. 3. If you are prescribed insulin, Do not take your mealtime insulin if your blood sugars less than 120   4. Call our office if you have blood sugars less than 80 or greater then 300 on two or more occasions  5. Call our office if you have any questions regarding your blood sugars or insulin dosing regiment  6. Signs of low blood sugar include sweating , heart racing, dizziness and weakness. Check your blood sugar if you have any of these symptoms. Medications-  1. Insulin only if glucose > 150 (only needed twice in last 2 weeks)   2. Increase pioglitizone/ Actos 30 mg by mouth daily  3. Continue Jardiance 10 mg daily   4. Increase Trulicity 4.5 mg weekly  5. Continue Metformin 500 mg twice a day    6. Sildenafil 100 mg p.o. daily as needed  7. Atorvastatin 20 mg nightly   8. Lovaza 4 capsules daily   9. Labs today   10.  Repeat labs and follow up in 3 months

## 2022-05-26 NOTE — TELEPHONE ENCOUNTER
Patient's wife is asking for a refill on insulin, the daytime and nighttime. I think they are confused as to what he is taking. Can you please call the patient to review what insulin he is supposed to be taking? Thanks!

## 2022-05-27 NOTE — TELEPHONE ENCOUNTER
I called the patient, left a voicemail to return our phone calls. According to my last office note he is not taking insulin unless his blood glucose levels are high. He has a follow-up appointment on 6/13 and we can discuss at that time.

## 2022-07-20 RX ORDER — EMPAGLIFLOZIN 10 MG/1
TABLET, FILM COATED ORAL
Qty: 30 TABLET | Refills: 3 | Status: SHIPPED | OUTPATIENT
Start: 2022-07-20

## 2022-07-20 RX ORDER — PIOGLITAZONEHYDROCHLORIDE 30 MG/1
TABLET ORAL
Qty: 30 TABLET | Refills: 3 | Status: SHIPPED | OUTPATIENT
Start: 2022-07-20

## 2022-08-08 RX ORDER — DULAGLUTIDE 4.5 MG/.5ML
INJECTION, SOLUTION SUBCUTANEOUS
Qty: 2 ML | Refills: 1 | Status: SHIPPED | OUTPATIENT
Start: 2022-08-08 | End: 2022-10-11

## 2022-08-16 NOTE — PROGRESS NOTES
Hospitalist Progress Note      PCP: No primary care provider on file. Date of Admission: 2017    Subjective:  No complaints voiced today    Medications:  Reviewed    Infusion Medications    sodium chloride 75 mL/hr at 17 2131    dextrose      dextrose 5 % and 0.45 % NaCl 150 mL/hr at 17 3559     Scheduled Medications    insulin lispro  0-6 Units Subcutaneous TID WC    insulin lispro  0-3 Units Subcutaneous Nightly    nafcillin  2 g Intravenous Q4H    insulin regular  0.1 Units/kg Intravenous Once    tamsulosin  0.4 mg Oral Daily    influenza virus vaccine  0.5 mL Intramuscular Once    insulin glargine  50 Units Subcutaneous Nightly    insulin lispro  15 Units Subcutaneous TID WC     PRN Meds: acetaminophen, calcium carbonate, glucose, glucagon (rDNA), dextrose, dextrose, dextrose 5 % and 0.45 % NaCl      Intake/Output Summary (Last 24 hours) at 17 1010  Last data filed at 17 0754   Gross per 24 hour   Intake              120 ml   Output              700 ml   Net             -580 ml       Exam:  BP (!) 144/79   Pulse 106   Temp 98.2 °F (36.8 °C) (Oral)   Resp 20   Ht 5' 6\" (1.676 m)   Wt 165 lb 5.5 oz (75 kg)   SpO2 100%   BMI 26.69 kg/m²     Average, Min, and Max for last 24 hours Vitals:  TEMPERATURE:  Temp  Av.6 °F (37 °C)  Min: 98 °F (36.7 °C)  Max: 99.7 °F (37.6 °C)    RESPIRATIONS RANGE: Resp  Av.2  Min: 16  Max: 26    PULSE RANGE: Pulse  Av.8  Min: 102  Max: 110    BLOOD PRESSURE RANGE:  Systolic (59RWQ), KTB:068 , Min:94 , DAJA:170   ; Diastolic (14HAP), THC:76, Min:64, Max:79      PULSE OXIMETRY RANGE: SpO2  Av.6 %  Min: 96 %  Max: 100 %    I/O last 3 completed shifts: In: 240 [P.O.:240]  Out: 700 [Urine:700]    General appearance: No apparent distress, appears stated age and cooperative. HEENT: Pupils equal, round, and reactive to light. Conjunctivae/corneas clear. Neck: Supple, with full range of motion. No jugular venous distention. Trachea midline. Respiratory:  Normal respiratory effort. Clear to auscultation, bilaterally without Rales/Wheezes/Rhonchi. Cardiovascular: Regular rate and rhythm with normal S1/S2 without murmurs, rubs or gallops. Abdomen: Soft, non-tender, non-distended with normal bowel sounds. Musculoskeletal: No clubbing, cyanosis or edema bilaterally. Full range of motion without deformity. Skin: Skin color, texture, turgor normal.  No rashes or lesions. Neurologic:  grossly non-focal.  Ext: no c/e/e    Labs:     Recent Results (from the past 24 hour(s))   POCT Glucose    Collection Time: 11/06/17 11:23 AM   Result Value Ref Range    POC Glucose 255 (H) 60 - 115 mg/dl    Performed on ACCU-CHEK    POCT Glucose    Collection Time: 11/06/17  5:08 PM   Result Value Ref Range    POC Glucose 195 (H) 60 - 115 mg/dl    Performed on ACCU-CHEK    POCT Glucose    Collection Time: 11/06/17  9:22 PM   Result Value Ref Range    POC Glucose 308 (H) 60 - 115 mg/dl    Performed on ACCU-CHEK    CBC Auto Differential    Collection Time: 11/07/17  5:12 AM   Result Value Ref Range    WBC 14.9 (H) 4.8 - 10.8 K/uL    RBC 3.65 (L) 4.70 - 6.10 M/uL    Hemoglobin 10.2 (L) 14.0 - 18.0 g/dL    Hematocrit 30.6 (L) 42.0 - 52.0 %    MCV 83.8 80.0 - 100.0 fL    MCH 27.9 27.0 - 31.3 pg    MCHC 33.3 33.0 - 37.0 %    RDW 12.8 11.5 - 14.5 %    Platelets 575 364 - 458 K/uL    Neutrophils % 86.8 %    Lymphocytes % 6.7 %    Monocytes % 5.9 %    Eosinophils % 0.4 %    Basophils % 0.2 %    Neutrophils # 12.9 (H) 1.4 - 6.5 K/uL    Lymphocytes # 1.0 1.0 - 4.8 K/uL    Monocytes # 0.9 (H) 0.2 - 0.8 K/uL    Eosinophils # 0.1 0.0 - 0.7 K/uL    Basophils # 0.0 0.0 - 0.2 K/uL   SEDIMENTATION RATE    Collection Time: 11/07/17  5:12 AM   Result Value Ref Range    Sed Rate 101 (H) 0 - 20 mm   POCT Glucose    Collection Time: 11/07/17  6:11 AM   Result Value Ref Range    POC Glucose 249 (H) 60 - 115 mg/dl    Performed on ACCU-CHEK            Assessment/Plan:    1.  Sepsis secondary to pyelonephritis with obstructive uropathy/ MSSA endocarditis:nafcillin noted per   Urine culture cultures and blood cultures. Infectious disease and Urology following. 2. Continue with Flomax 0.4 mg daily    3. DKA Resolved-Sliding scale insulin, and I could six, endocrinology following. Hemoglobin A1c 11.9.    4. Lactic acidosis: Likely secondary to diabetes along with developing DKA and pyelonephritis.       DVT Prophylaxis: via lovenox  Diet: DIET CARB CONTROL; Carb Control: 5 carbs/meal (approximate 2000 kcals/day)  Code Status: Full Code          Electronically signed by Ashley Christiansen MD on 11/7/2017 at 10:10 AM Rhofade Counseling: Rhofade is a topical medication which can decrease superficial blood flow where applied. Side effects are uncommon and include stinging, redness and allergic reactions.

## 2022-09-10 NOTE — PROGRESS NOTES
ED Attending Physician Note      Patient : Marcelina Yu Age: 22 year old Sex: female   MRN: [id number] Encounter Date: 9/10/2022      History         Chief Complaint   Patient presents with   • Sore Throat         HPI: 22 year old female presents with 1 day of sore throat cough itching in the throat and bilateral ear pain.  Denies any fevers chills denies any nausea or vomiting denies any loss of smell or taste denies any chest pain shortness of breath.  Is COVID vaccinated and boosted.      No Known Allergies  NONE    No past medical history on file.  ASD    No past surgical history on file.  ASD repair    No family history on file.      Social History     Tobacco Use   • Smoking status: Never Smoker   • Smokeless tobacco: Never Used   Substance Use Topics   • Alcohol use: Never   • Drug use: Never       Review of Systems   All other systems reviewed and are negative.      Physical Exam     ED Triage Vitals [09/09/22 2106]   ED Triage Vitals Group      Temp 98.1 °F (36.7 °C)      Heart Rate (!) 103      Resp 18      /77      SpO2 97 %      EtCO2 mmHg       Height       Weight       Weight Scale Used       BMI (Calculated)       IBW/kg (Calculated)        Physical Exam  Vitals reviewed.   HENT:      Head: Normocephalic and atraumatic.      Comments: Mild nasal congestion and pharyngeal erythema.     Neck: Neck supple.   Eyes:      Extraocular Movements: Extraocular movements intact.   Cardiovascular:      Rate and Rhythm: Normal rate and regular rhythm.      Pulses: Normal pulses.   Pulmonary:      Effort: Pulmonary effort is normal. No respiratory distress.      Breath sounds: Normal breath sounds.   Abdominal:      General: Abdomen is flat. There is no distension.      Palpations: Abdomen is soft.      Tenderness: There is no abdominal tenderness. There is no guarding or rebound.   Musculoskeletal:         General: Normal range of motion.   Skin:     General: Skin is warm.   Neurological:      General: No  MSSA endocarditis  MSSA pyelonephritis     Had R ureteral J stent placed Hydronephrosis with urinary obstruction due to ureteral calculus   Objective improvement in pain     CT scan showed right perinephric stranding no hydronephrosis     2-D echo of the heart does not show any vegetation     FILI done 11/9/17 No Vegetations   Blood cultures remain positive           Review of Systems   Constitutional: Positive for chills, diaphoresis, fever and malaise/fatigue. Negative for weight loss. Genitourinary: Positive for dysuria, flank pain, frequency, hematuria and urgency. Musculoskeletal: Complains of some neck pain today  Skin: Negative. Neurological: Positive for weakness. Negative for dizziness, tingling, sensory change, seizures and headaches.         Family medical historyDiabetes hypertension   No Known Allergies     BP (!) 168/78   Pulse 109   Temp 99.7 °F (37.6 °C) (Oral)   Resp 16   Ht 5' 6\" (1.676 m)   Wt 167 lb 8.8 oz (76 kg)   SpO2 100%   BMI 27.04 kg/m²     Head: Normocephalic. Eyes: Pupils are equal, round, and reactive to light. Neck: No JVD present. No tracheal deviation present. No thyromegaly present. Cardiovascular: Normal rate, normal heart sounds and intact distal pulses. Exam reveals no gallop and no friction rub. No murmur heard. Pulmonary/Chest: Effort normal and breath sounds normal. No respiratory distress. He has no wheezes. He has no rales. He exhibits no tenderness. Abdominal: Soft. Bowel sounds are normal. He exhibits no distension and no mass. There is no tenderness. There is no rebound and no guarding. Musculoskeletal: Normal range of motion. He exhibits no edema or tenderness. Lymphadenopathy:     He has no cervical adenopathy. Neurological: He is alert and oriented to person, place, and time. No cranial nerve deficit. Skin: Skin is warm and dry. No rash noted. No erythema.  No pallor.            EXAMINATION: MRI CERVICAL SPINE W WO CONTRAST       DATE focal deficit present.      Mental Status: She is alert. Mental status is at baseline.   Psychiatric:         Mood and Affect: Mood normal.         ED Course     Procedures: NONE    EKG:  NONE      Critical Care: NONE    Lab Results     Results for orders placed or performed during the hospital encounter of 09/10/22   COVID/Flu/RSV panel   Result Value Ref Range    Rapid SARS-COV-2 by PCR Detected (A) Not Detected / Detected / Presumptive Positive / Inhibitors present    Influenza A by PCR Not Detected Not Detected    Influenza B by PCR Not Detected Not Detected    RSV BY PCR Not Detected Not Detected    Procedural Comment      SARS-CoV-2 Ct Value 19.2    Streptococcus group A PCR    Specimen: Throat; Swab   Result Value Ref Range    STREPTOCOCCUS GROUP A PCR Not Detected Not Detected         Radiology Results     Imaging Results    None         Medical Decision Making        ED Course as of 09/10/22 0027   Fri Sep 09, 2022   2219 SARS COV 2 RNA ([facility])(!): Detected [TN]      ED Course User Index  [TN] Henry Allen MD       Patient well-appearing no indications for Paxil of it.  Pulse ox is 99 on room air.  Heart rate is regular.  Was 101 when she first came in and repeat.  But she is otherwise healthy and well-appearing I believe she safe for discharge patient verbalize agreement plan aware of red flag symptoms require return to the ER.      Clinical Impression     ED Diagnosis   1. COVID-19 virus infection         Disposition        Discharge 9/10/2022 12:21 AM  Marcelina Yu discharge to home/self care.        Henry Allen MD   9/10/2022 12:27 AM       Henry Allen MD  09/10/22 0027     kidney      Continue nafcillin  Blood cult still + 11/11/17 + MSSA

## 2022-09-16 ENCOUNTER — OFFICE VISIT (OUTPATIENT)
Dept: FAMILY MEDICINE CLINIC | Age: 57
End: 2022-09-16
Payer: COMMERCIAL

## 2022-09-16 VITALS
DIASTOLIC BLOOD PRESSURE: 84 MMHG | RESPIRATION RATE: 16 BRPM | HEART RATE: 95 BPM | HEIGHT: 67 IN | TEMPERATURE: 97.9 F | BODY MASS INDEX: 27.15 KG/M2 | WEIGHT: 173 LBS | OXYGEN SATURATION: 98 % | SYSTOLIC BLOOD PRESSURE: 120 MMHG

## 2022-09-16 DIAGNOSIS — G63 POLYNEUROPATHY ASSOCIATED WITH UNDERLYING DISEASE (HCC): ICD-10-CM

## 2022-09-16 DIAGNOSIS — R31.29 MICROSCOPIC HEMATURIA: ICD-10-CM

## 2022-09-16 DIAGNOSIS — Z12.5 SPECIAL SCREENING FOR MALIGNANT NEOPLASM OF PROSTATE: ICD-10-CM

## 2022-09-16 DIAGNOSIS — Z00.00 HEALTH MAINTENANCE EXAMINATION: Primary | ICD-10-CM

## 2022-09-16 DIAGNOSIS — M54.42 ACUTE LEFT-SIDED LOW BACK PAIN WITH LEFT-SIDED SCIATICA: ICD-10-CM

## 2022-09-16 DIAGNOSIS — E78.1 HYPERTRIGLYCERIDEMIA: ICD-10-CM

## 2022-09-16 LAB
BILIRUBIN, POC: NORMAL
BLOOD URINE, POC: NORMAL
CLARITY, POC: CLEAR
COLOR, POC: YELLOW
GLUCOSE URINE, POC: 500
KETONES, POC: NORMAL
LEUKOCYTE EST, POC: NORMAL
NITRITE, POC: NORMAL
PH, POC: 5.5
PROTEIN, POC: NORMAL
SPECIFIC GRAVITY, POC: 1.01
UROBILINOGEN, POC: 0.2

## 2022-09-16 PROCEDURE — 81002 URINALYSIS NONAUTO W/O SCOPE: CPT | Performed by: FAMILY MEDICINE

## 2022-09-16 PROCEDURE — 99386 PREV VISIT NEW AGE 40-64: CPT | Performed by: FAMILY MEDICINE

## 2022-09-16 RX ORDER — CELECOXIB 100 MG/1
100 CAPSULE ORAL DAILY
Qty: 30 CAPSULE | Refills: 0 | Status: SHIPPED | OUTPATIENT
Start: 2022-09-16

## 2022-09-16 SDOH — ECONOMIC STABILITY: FOOD INSECURITY: WITHIN THE PAST 12 MONTHS, THE FOOD YOU BOUGHT JUST DIDN'T LAST AND YOU DIDN'T HAVE MONEY TO GET MORE.: NEVER TRUE

## 2022-09-16 SDOH — ECONOMIC STABILITY: FOOD INSECURITY: WITHIN THE PAST 12 MONTHS, YOU WORRIED THAT YOUR FOOD WOULD RUN OUT BEFORE YOU GOT MONEY TO BUY MORE.: NEVER TRUE

## 2022-09-16 ASSESSMENT — SOCIAL DETERMINANTS OF HEALTH (SDOH): HOW HARD IS IT FOR YOU TO PAY FOR THE VERY BASICS LIKE FOOD, HOUSING, MEDICAL CARE, AND HEATING?: NOT HARD AT ALL

## 2022-09-16 ASSESSMENT — PATIENT HEALTH QUESTIONNAIRE - PHQ9
SUM OF ALL RESPONSES TO PHQ9 QUESTIONS 1 & 2: 0
2. FEELING DOWN, DEPRESSED OR HOPELESS: 0
SUM OF ALL RESPONSES TO PHQ QUESTIONS 1-9: 0
1. LITTLE INTEREST OR PLEASURE IN DOING THINGS: 0
SUM OF ALL RESPONSES TO PHQ QUESTIONS 1-9: 0

## 2022-09-16 ASSESSMENT — ENCOUNTER SYMPTOMS
VOMITING: 0
COUGH: 0
DIARRHEA: 0

## 2022-09-16 NOTE — PROGRESS NOTES
Subjective:      Patient ID: Morenita Rouse is a 55 y.o. male    Hyperlipidemia  This is a chronic problem. The current episode started more than 1 year ago. Pertinent negatives include no chest pain. Here for physical.   Does see endocrine for diabetes care. Has has few weeks of lower back and leg pain going down to ankle. Has felt some tingling of leg. No changes with weight. No changes with urine flow. No hematuria. Review of Systems   Constitutional:  Negative for chills and fever. Respiratory:  Negative for cough. Cardiovascular:  Negative for chest pain. Gastrointestinal:  Negative for diarrhea and vomiting. Neurological:  Negative for weakness. Reviewed allergy, medical, social, surgical, family and med list changes and updated   Files     Social History     Socioeconomic History    Marital status:    Tobacco Use    Smoking status: Never    Smokeless tobacco: Never   Substance and Sexual Activity    Alcohol use: Yes     Comment: occasional    Drug use: No     Social Determinants of Health     Financial Resource Strain: Low Risk     Difficulty of Paying Living Expenses: Not hard at all   Food Insecurity: No Food Insecurity    Worried About Running Out of Food in the Last Year: Never true    Ran Out of Food in the Last Year: Never true     Current Outpatient Medications   Medication Sig Dispense Refill    TRULICITY 4.5 PE/5.3LN SOPN inject 4.5 milligrams subcutaneously ONCE A WEEK 2 mL 1    JARDIANCE 10 MG tablet take 1 tablet by mouth once daily 30 tablet 3    pioglitazone (ACTOS) 30 MG tablet take 1 tablet by mouth once daily 30 tablet 3    omega-3 acid ethyl esters (LOVAZA) 1 g capsule take 2 capsules by mouth twice a day 120 capsule 3    insulin regular (HUMULIN R) 100 UNIT/ML injection INJECT 12-15 UNITS AT [facility] MEAL 30 mL 3     No current facility-administered medications for this visit.      Family History   Problem Relation Age of Onset    Hypertension Father     Heart Disease Father      Past Medical History:   Diagnosis Date    Acid reflux disease with ulcer 03/19/2018    Bilateral carpal tunnel syndrome 9/24/2020    Diabetes mellitus (HCC)     BUCKLEY (dyspnea on exertion) 2/2/2018    Hypertension     Mixed hyperlipidemia 2/2/2018   Lower back xry neg   Objective:   /84   Pulse 95   Temp 97.9 °F (36.6 °C)   Resp 16   Ht 5' 7\" (1.702 m)   Wt 173 lb (78.5 kg)   SpO2 98%   BMI 27.10 kg/m²     Physical Exam      PHYSICAL EXAMINATION:  Vital signs as recorded. GENERAL:  Alert, oriented male, well nourished, well developed with no acute distress with normal affect. HEENT:   Pupils equal and reactive to light and accommodation. Nares negative. Pharynx without erythema or exudate. TMs are clear. Sclerae and conjunctiva are also clear. No masses visible. NECK:  supple without masses, no adenopathy or bruits noted. Thyroid without any enlargements or nodularity. HEART:  RRR without murmurs, rubs or gallops. LUNGS:  clear to auscultation with normal breath sounds. No acute distress noted. Negative for rhonchi or wheezes. ABDOMEN:  soft times 4, nontender. Bowel sounds positive times 4. No guarding or rebound or bruits. No hepatosplenomegaly. No masses. GENITAL EXAM:  normal penis with no abnormalities noted. No hernias or testicular masses appreciated. EXTREMITIES:    No edema noted. No joint deformity or swelling noted either. SKIN: so suspicious skin lesions or masses noted. NEURO: No focal deficits noted at all. Lower extremity strength 5/5 and equal RECTAL;   Prostate normal size. No masses . No nodules. PULSES:  Radial 2+ and equal while P.T 1+ and equal.    BACK  Mild tenderness along left lower l-s junction   Assessment:       Diagnosis Orders   1. Health maintenance examination  Lipid Panel    Comprehensive Metabolic Panel    CBC with Auto Differential      2. Polyneuropathy associated with underlying disease ([address]        3.  Uncontrolled type 2 diabetes mellitus with complication, with long-term current use of insulin ([address]        4. Hypertriglyceridemia  Lipid Panel    Comprehensive Metabolic Panel    CBC with Auto Differential      5. Acute left-sided low back pain with left-sided sciatica  XR LUMBAR SPINE (MIN 4 VIEWS)    [facility] - [facility]      6. Special screening for malignant neoplasm of prostate  POCT Urinalysis no Micro    PSA Screening      7. Microscopic hematuria  Culture, Urine              Plan:      Orders Placed This Encounter   Procedures    Culture, Urine     Standing Status:   Future     Standing Expiration Date:   9/16/2023     Order Specific Question:   Specify (ex-cath, midstream, cysto, etc)?      Answer:   mid    XR LUMBAR SPINE (MIN 4 VIEWS)     Standing Status:   Future     Number of Occurrences:   1     Standing Expiration Date:   9/16/2023    Lipid Panel     Standing Status:   Future     Standing Expiration Date:   9/16/2023    Comprehensive Metabolic Panel     Standing Status:   Future     Standing Expiration Date:   9/16/2023    CBC with Auto Differential     Standing Status:   Future     Standing Expiration Date:   1/16/2023    PSA Screening     Standing Status:   Future     Standing Expiration Date:   9/16/2023    [facility]     Referral Priority:   Routine     Referral Type:   Eval and Treat     Referral Reason:   Specialty Services Required     Requested Specialty:   Physical Therapist     Number of Visits Requested:   1    POCT Urinalysis no Micro     Fasting blood work soon -f/u after done

## 2022-09-18 LAB — URINE CULTURE, ROUTINE: NORMAL

## 2022-10-12 RX ORDER — DULAGLUTIDE 4.5 MG/.5ML
INJECTION, SOLUTION SUBCUTANEOUS
Qty: 4 ML | Refills: 0 | Status: SHIPPED | OUTPATIENT
Start: 2022-10-12

## 2022-10-17 ENCOUNTER — TELEPHONE (OUTPATIENT)
Dept: FAMILY MEDICINE CLINIC | Age: 57
End: 2022-10-17

## 2022-11-11 ENCOUNTER — TELEPHONE (OUTPATIENT)
Dept: FAMILY MEDICINE CLINIC | Age: 57
End: 2022-11-11

## 2023-01-05 ENCOUNTER — TELEPHONE (OUTPATIENT)
Dept: FAMILY MEDICINE CLINIC | Age: 58
End: 2023-01-05

## 2023-01-16 DIAGNOSIS — E78.2 MIXED HYPERLIPIDEMIA: ICD-10-CM

## 2023-01-16 RX ORDER — OMEGA-3-ACID ETHYL ESTERS 1 G/1
CAPSULE, LIQUID FILLED ORAL
Qty: 120 CAPSULE | Refills: 1 | Status: SHIPPED | OUTPATIENT
Start: 2023-01-16

## 2023-01-16 RX ORDER — PIOGLITAZONEHYDROCHLORIDE 30 MG/1
TABLET ORAL
Qty: 30 TABLET | Refills: 1 | Status: SHIPPED | OUTPATIENT
Start: 2023-01-16

## 2023-01-16 RX ORDER — DULAGLUTIDE 4.5 MG/.5ML
INJECTION, SOLUTION SUBCUTANEOUS
Qty: 4 ML | Refills: 1 | Status: SHIPPED | OUTPATIENT
Start: 2023-01-16

## 2023-01-20 RX ORDER — DULAGLUTIDE 1.5 MG/.5ML
INJECTION, SOLUTION SUBCUTANEOUS
Qty: 4 ADJUSTABLE DOSE PRE-FILLED PEN SYRINGE | Refills: 3 | Status: SHIPPED | OUTPATIENT
Start: 2023-01-20

## 2023-04-04 ENCOUNTER — OFFICE VISIT (OUTPATIENT)
Dept: ENDOCRINOLOGY | Age: 58
End: 2023-04-04
Payer: COMMERCIAL

## 2023-04-04 VITALS
SYSTOLIC BLOOD PRESSURE: 152 MMHG | DIASTOLIC BLOOD PRESSURE: 95 MMHG | OXYGEN SATURATION: 98 % | HEART RATE: 105 BPM | WEIGHT: 176 LBS | HEIGHT: 67 IN | BODY MASS INDEX: 27.62 KG/M2

## 2023-04-04 DIAGNOSIS — N52.8 OTHER MALE ERECTILE DYSFUNCTION: ICD-10-CM

## 2023-04-04 DIAGNOSIS — E11.65 TYPE 2 DIABETES MELLITUS WITH HYPERGLYCEMIA, UNSPECIFIED WHETHER LONG TERM INSULIN USE (HCC): ICD-10-CM

## 2023-04-04 DIAGNOSIS — E11.65 TYPE 2 DIABETES MELLITUS WITH HYPERGLYCEMIA, UNSPECIFIED WHETHER LONG TERM INSULIN USE (HCC): Primary | ICD-10-CM

## 2023-04-04 LAB
ALBUMIN SERPL-MCNC: 4.3 G/DL (ref 3.5–4.6)
ALP SERPL-CCNC: 122 U/L (ref 35–104)
ALT SERPL-CCNC: 32 U/L (ref 0–41)
ANION GAP SERPL CALCULATED.3IONS-SCNC: 12 MEQ/L (ref 9–15)
AST SERPL-CCNC: 29 U/L (ref 0–40)
BILIRUB SERPL-MCNC: 0.4 MG/DL (ref 0.2–0.7)
BUN SERPL-MCNC: 20 MG/DL (ref 6–20)
CALCIUM SERPL-MCNC: 9.5 MG/DL (ref 8.5–9.9)
CHLORIDE SERPL-SCNC: 106 MEQ/L (ref 95–107)
CHP ED QC CHECK: ABNORMAL
CO2 SERPL-SCNC: 23 MEQ/L (ref 20–31)
CREAT SERPL-MCNC: 1.39 MG/DL (ref 0.7–1.2)
ERYTHROCYTE [DISTWIDTH] IN BLOOD BY AUTOMATED COUNT: 13.8 % (ref 11.5–14.5)
GLOBULIN SER CALC-MCNC: 3 G/DL (ref 2.3–3.5)
GLUCOSE BLD-MCNC: 231 MG/DL
GLUCOSE SERPL-MCNC: 185 MG/DL (ref 70–99)
HBA1C MFR BLD: 8.9 %
HCT VFR BLD AUTO: 47 % (ref 42–52)
HGB BLD-MCNC: 16.1 G/DL (ref 14–18)
MCH RBC QN AUTO: 30.1 PG (ref 27–31.3)
MCHC RBC AUTO-ENTMCNC: 34.3 % (ref 33–37)
MCV RBC AUTO: 87.7 FL (ref 79–92.2)
PLATELET # BLD AUTO: 202 K/UL (ref 130–400)
POTASSIUM SERPL-SCNC: 3.8 MEQ/L (ref 3.4–4.9)
PROT SERPL-MCNC: 7.3 G/DL (ref 6.3–8)
RBC # BLD AUTO: 5.37 M/UL (ref 4.7–6.1)
SODIUM SERPL-SCNC: 141 MEQ/L (ref 135–144)
WBC # BLD AUTO: 8.4 K/UL (ref 4.8–10.8)

## 2023-04-04 PROCEDURE — 82962 GLUCOSE BLOOD TEST: CPT | Performed by: PHYSICIAN ASSISTANT

## 2023-04-04 PROCEDURE — G8419 CALC BMI OUT NRM PARAM NOF/U: HCPCS | Performed by: PHYSICIAN ASSISTANT

## 2023-04-04 PROCEDURE — 1036F TOBACCO NON-USER: CPT | Performed by: PHYSICIAN ASSISTANT

## 2023-04-04 PROCEDURE — 99214 OFFICE O/P EST MOD 30 MIN: CPT | Performed by: PHYSICIAN ASSISTANT

## 2023-04-04 PROCEDURE — 83036 HEMOGLOBIN GLYCOSYLATED A1C: CPT | Performed by: PHYSICIAN ASSISTANT

## 2023-04-04 PROCEDURE — 2022F DILAT RTA XM EVC RTNOPTHY: CPT | Performed by: PHYSICIAN ASSISTANT

## 2023-04-04 PROCEDURE — 3052F HG A1C>EQUAL 8.0%<EQUAL 9.0%: CPT | Performed by: PHYSICIAN ASSISTANT

## 2023-04-04 PROCEDURE — G8427 DOCREV CUR MEDS BY ELIG CLIN: HCPCS | Performed by: PHYSICIAN ASSISTANT

## 2023-04-04 PROCEDURE — 3017F COLORECTAL CA SCREEN DOC REV: CPT | Performed by: PHYSICIAN ASSISTANT

## 2023-04-04 RX ORDER — METFORMIN HYDROCHLORIDE 500 MG/1
500 TABLET, EXTENDED RELEASE ORAL 2 TIMES DAILY
Qty: 60 TABLET | Refills: 5 | Status: SHIPPED | OUTPATIENT
Start: 2023-04-04

## 2023-04-04 RX ORDER — GLUCOSAMINE HCL/CHONDROITIN SU 500-400 MG
1 CAPSULE ORAL
Qty: 150 STRIP | Refills: 3 | Status: SHIPPED | OUTPATIENT
Start: 2023-04-04

## 2023-04-04 RX ORDER — DULAGLUTIDE 4.5 MG/.5ML
INJECTION, SOLUTION SUBCUTANEOUS
Qty: 4 ML | Refills: 1 | Status: SHIPPED | OUTPATIENT
Start: 2023-04-04

## 2023-04-04 RX ORDER — INSULIN LISPRO 100 [IU]/ML
INJECTION, SOLUTION INTRAVENOUS; SUBCUTANEOUS
Qty: 5 ADJUSTABLE DOSE PRE-FILLED PEN SYRINGE | Refills: 3 | Status: SHIPPED | OUTPATIENT
Start: 2023-04-04

## 2023-04-04 RX ORDER — PIOGLITAZONEHYDROCHLORIDE 30 MG/1
TABLET ORAL
Qty: 30 TABLET | Refills: 1 | Status: SHIPPED | OUTPATIENT
Start: 2023-04-04

## 2023-04-04 RX ORDER — BLOOD-GLUCOSE SENSOR
1 EACH MISCELLANEOUS CONTINUOUS
Qty: 3 EACH | Refills: 10 | Status: SHIPPED | OUTPATIENT
Start: 2023-04-04

## 2023-04-04 ASSESSMENT — ENCOUNTER SYMPTOMS
SINUS PRESSURE: 0
RHINORRHEA: 0
VOMITING: 0
EYE PAIN: 0
SHORTNESS OF BREATH: 0
DIARRHEA: 0
NAUSEA: 0
ABDOMINAL PAIN: 0
WHEEZING: 0
EYE REDNESS: 0
COUGH: 0
SORE THROAT: 0

## 2023-04-04 NOTE — PROGRESS NOTES
There are no diabetic complications. Risk factors for coronary artery disease include dyslipidemia, male sex and hypertension. Current diabetic treatment includes insulin injections and oral agent (dual therapy). He is compliant with treatment all of the time. He is following a generally healthy diet. Meal planning includes avoidance of concentrated sweets and carbohydrate counting. He rarely participates in exercise. His home blood glucose trend is decreasing steadily. His overall blood glucose range is 140-180 mg/dl. An ACE inhibitor/angiotensin II receptor blocker is not being taken. He does not see a podiatrist.Eye exam is current.      Past Medical History:   Diagnosis Date    Acid reflux disease with ulcer 03/19/2018    Bilateral carpal tunnel syndrome 9/24/2020    Diabetes mellitus (HCC)     BUCKLEY (dyspnea on exertion) 2/2/2018    Hypertension     Mixed hyperlipidemia 2/2/2018       Current Outpatient Medications:     insulin regular (HUMULIN R) 100 UNIT/ML injection, INJECT 12-15 UNITS AT [facility] MEAL, Disp: 30 mL, Rfl: 3    Dulaglutide (TRULICITY) 4.5 MD/1.1MV SOPN, inject 4.5 milligrams subcutaneously ONCE A WEEK, Disp: 4 mL, Rfl: 1    empagliflozin (JARDIANCE) 10 MG tablet, take 1 tablet by mouth once daily, Disp: 30 tablet, Rfl: 1    pioglitazone (ACTOS) 30 MG tablet, take 1 tablet by mouth once daily, Disp: 30 tablet, Rfl: 1    omega-3 acid ethyl esters (LOVAZA) 1 g capsule, take 2 capsules by mouth twice a day, Disp: 120 capsule, Rfl: 1    celecoxib (CELEBREX) 100 MG capsule, Take 1 capsule by mouth daily, Disp: 30 capsule, Rfl: 0    dulaglutide (TRULICITY) 1.5 XV/6.1IJ SC injection, Inject 1.5 mg once a week (Patient not taking: Reported on 4/4/2023), Disp: 4 Adjustable Dose Pre-filled Pen Syringe, Rfl: 3  Lab Results   Component Value Date     03/16/2021    K 4.3 03/16/2021     03/16/2021    CO2 21 03/16/2021    BUN 18 03/16/2021    CREATININE 1.30 (H) 03/16/2021    GLUCOSE 149 03/14/2022

## 2023-04-04 NOTE — PATIENT INSTRUCTIONS
Endocrinology-    Check your blood sugars 4 times a day, before meals and at night  Document these numbers in a blood glucose log and bring them with you to your follow-up appointment. If you are prescribed insulin, Do not take your mealtime insulin if your blood sugars less than 120   Call our office if you have blood sugars less than 80 or greater then 300 on two or more occasions  Call our office if you have any questions regarding your blood sugars or insulin dosing regiment  Signs of low blood sugar may include tremors, feeling shaky, sweating, dizziness, confusion and weakness. Check your blood sugar immediatly if you have any of these symptoms.      The plan as discussed at your appointment-    Humalog, inject 3 times daily before meals- 150-200 15 units, 201-250 25 units, 250-300 30 units   pioglitizone/ Actos 30 mg by mouth daily  Continue Jardiance 10 mg daily   Increase Trulicity 4.5 mg weekly  Continue Metformin 500 mg twice a day    Sildenafil 100 mg p.o. daily as needed  Atorvastatin 20 mg nightly   Lovaza 4 capsules daily   Labs today   Repeat labs and follow up in 3 months

## 2023-07-03 DIAGNOSIS — E11.65 TYPE 2 DIABETES MELLITUS WITH HYPERGLYCEMIA, UNSPECIFIED WHETHER LONG TERM INSULIN USE (HCC): ICD-10-CM

## 2023-07-03 DIAGNOSIS — N52.8 OTHER MALE ERECTILE DYSFUNCTION: ICD-10-CM

## 2023-07-03 LAB
ALBUMIN SERPL-MCNC: 4.5 G/DL (ref 3.5–4.6)
ALP SERPL-CCNC: 84 U/L (ref 35–104)
ALT SERPL-CCNC: 33 U/L (ref 0–41)
ANION GAP SERPL CALCULATED.3IONS-SCNC: 12 MEQ/L (ref 9–15)
AST SERPL-CCNC: 36 U/L (ref 0–40)
BILIRUB SERPL-MCNC: 0.6 MG/DL (ref 0.2–0.7)
BUN SERPL-MCNC: 19 MG/DL (ref 6–20)
CALCIUM SERPL-MCNC: 9.5 MG/DL (ref 8.5–9.9)
CHLORIDE SERPL-SCNC: 101 MEQ/L (ref 95–107)
CHOLEST SERPL-MCNC: 213 MG/DL (ref 0–199)
CO2 SERPL-SCNC: 26 MEQ/L (ref 20–31)
CREAT SERPL-MCNC: 1.16 MG/DL (ref 0.7–1.2)
ERYTHROCYTE [DISTWIDTH] IN BLOOD BY AUTOMATED COUNT: 14.3 % (ref 11.5–14.5)
GLOBULIN SER CALC-MCNC: 3.2 G/DL (ref 2.3–3.5)
GLUCOSE SERPL-MCNC: 131 MG/DL (ref 70–99)
HCT VFR BLD AUTO: 49.2 % (ref 42–52)
HDLC SERPL-MCNC: 47 MG/DL (ref 40–59)
HGB BLD-MCNC: 16.5 G/DL (ref 14–18)
LDLC SERPL CALC-MCNC: 131 MG/DL (ref 0–129)
MCH RBC QN AUTO: 30.3 PG (ref 27–31.3)
MCHC RBC AUTO-ENTMCNC: 33.5 % (ref 33–37)
MCV RBC AUTO: 90.6 FL (ref 79–92.2)
PLATELET # BLD AUTO: 210 K/UL (ref 130–400)
POTASSIUM SERPL-SCNC: 4.7 MEQ/L (ref 3.4–4.9)
PROT SERPL-MCNC: 7.7 G/DL (ref 6.3–8)
RBC # BLD AUTO: 5.43 M/UL (ref 4.7–6.1)
SODIUM SERPL-SCNC: 139 MEQ/L (ref 135–144)
TRIGL SERPL-MCNC: 177 MG/DL (ref 0–150)
WBC # BLD AUTO: 7 K/UL (ref 4.8–10.8)

## 2023-07-04 LAB
HBA1C MFR BLD: 6.7 % (ref 4.8–5.9)
SHBG SERPL-SCNC: 65 NMOL/L (ref 11–80)
TESTOST FREE SERPL-MCNC: 110.3 PG/ML (ref 47–244)
TESTOST SERPL-MCNC: 777 NG/DL (ref 220–1000)

## 2023-07-20 ENCOUNTER — OFFICE VISIT (OUTPATIENT)
Dept: ENDOCRINOLOGY | Age: 58
End: 2023-07-20

## 2023-07-20 VITALS
OXYGEN SATURATION: 96 % | WEIGHT: 176 LBS | HEART RATE: 93 BPM | SYSTOLIC BLOOD PRESSURE: 121 MMHG | DIASTOLIC BLOOD PRESSURE: 84 MMHG | HEIGHT: 67 IN | BODY MASS INDEX: 27.62 KG/M2

## 2023-07-20 DIAGNOSIS — N52.8 OTHER MALE ERECTILE DYSFUNCTION: ICD-10-CM

## 2023-07-20 DIAGNOSIS — E11.65 TYPE 2 DIABETES MELLITUS WITH HYPERGLYCEMIA, UNSPECIFIED WHETHER LONG TERM INSULIN USE (HCC): Primary | ICD-10-CM

## 2023-07-20 LAB
CHP ED QC CHECK: ABNORMAL
GLUCOSE BLD-MCNC: 116 MG/DL

## 2023-07-20 ASSESSMENT — ENCOUNTER SYMPTOMS
DIARRHEA: 0
SINUS PRESSURE: 0
SHORTNESS OF BREATH: 0
NAUSEA: 0
SORE THROAT: 0
VOMITING: 0
EYE REDNESS: 0
EYE PAIN: 0
WHEEZING: 0
ABDOMINAL PAIN: 0
RHINORRHEA: 0
COUGH: 0

## 2023-07-20 NOTE — PROGRESS NOTES
diabetic complications. Risk factors for coronary artery disease include dyslipidemia, male sex and hypertension. Current diabetic treatment includes insulin injections and oral agent (dual therapy). He is compliant with treatment all of the time. He is following a generally healthy diet. Meal planning includes avoidance of concentrated sweets and carbohydrate counting. He rarely participates in exercise. His home blood glucose trend is decreasing steadily. His overall blood glucose range is 140-180 mg/dl. An ACE inhibitor/angiotensin II receptor blocker is not being taken. He does not see a podiatrist.Eye exam is current. Past Medical History:   Diagnosis Date    Acid reflux disease with ulcer 03/19/2018    Bilateral carpal tunnel syndrome 9/24/2020    Diabetes mellitus (HCC)     BUCKLEY (dyspnea on exertion) 2/2/2018    Hypertension     Mixed hyperlipidemia 2/2/2018       Current Outpatient Medications:     insulin regular (HUMULIN R) 100 UNIT/ML injection, inject 12 to 15 units subcutaneously with meals, Disp: 30 mL, Rfl: 3    empagliflozin (JARDIANCE) 10 MG tablet, take 1 tablet by mouth once daily, Disp: 30 tablet, Rfl: 3    pioglitazone (ACTOS) 30 MG tablet, take 1 tablet by mouth once daily, Disp: 30 tablet, Rfl: 1    metFORMIN (GLUCOPHAGE-XR) 500 MG extended release tablet, Take 1 tablet by mouth 2 times daily, Disp: 60 tablet, Rfl: 5    Continuous Blood Gluc  (DEXCOM G7 ) KYM, Give 1 , Disp: 1 each, Rfl: 0    Dulaglutide (TRULICITY) 4.5 FL/1.8AZ SOPN, inject 4.5 milligrams subcutaneously ONCE A WEEK, Disp: 4 mL, Rfl: 1    blood glucose monitor strips, 1 strip by Other route 4 times daily (before meals and nightly) Pt test 4x daily Dx E11.65.   May substitute for generic or insurance covered product, Disp: 150 strip, Rfl: 3    insulin lispro, 1 Unit Dial, (HUMALOG KWIKPEN) 100 UNIT/ML SOPN, Humalog, inject 3 times daily before meals- 150-200 15 units, 201-250 25 units, 250-300 30

## 2023-10-04 RX ORDER — DULAGLUTIDE 4.5 MG/.5ML
INJECTION, SOLUTION SUBCUTANEOUS
Qty: 8 ML | Refills: 3 | Status: SHIPPED | OUTPATIENT
Start: 2023-10-04

## 2023-11-21 ENCOUNTER — OFFICE VISIT (OUTPATIENT)
Dept: ENDOCRINOLOGY | Age: 58
End: 2023-11-21
Payer: COMMERCIAL

## 2023-11-21 VITALS
HEART RATE: 96 BPM | OXYGEN SATURATION: 96 % | WEIGHT: 177 LBS | HEIGHT: 67 IN | SYSTOLIC BLOOD PRESSURE: 152 MMHG | BODY MASS INDEX: 27.78 KG/M2 | DIASTOLIC BLOOD PRESSURE: 96 MMHG

## 2023-11-21 DIAGNOSIS — E11.65 TYPE 2 DIABETES MELLITUS WITH HYPERGLYCEMIA, UNSPECIFIED WHETHER LONG TERM INSULIN USE (HCC): Primary | ICD-10-CM

## 2023-11-21 LAB
CHP ED QC CHECK: NORMAL
GLUCOSE BLD-MCNC: 100 MG/DL
HBA1C MFR BLD: 6.3 %

## 2023-11-21 PROCEDURE — 99214 OFFICE O/P EST MOD 30 MIN: CPT | Performed by: PHYSICIAN ASSISTANT

## 2023-11-21 PROCEDURE — 3017F COLORECTAL CA SCREEN DOC REV: CPT | Performed by: PHYSICIAN ASSISTANT

## 2023-11-21 PROCEDURE — 1036F TOBACCO NON-USER: CPT | Performed by: PHYSICIAN ASSISTANT

## 2023-11-21 PROCEDURE — G8419 CALC BMI OUT NRM PARAM NOF/U: HCPCS | Performed by: PHYSICIAN ASSISTANT

## 2023-11-21 PROCEDURE — G8427 DOCREV CUR MEDS BY ELIG CLIN: HCPCS | Performed by: PHYSICIAN ASSISTANT

## 2023-11-21 PROCEDURE — 3044F HG A1C LEVEL LT 7.0%: CPT | Performed by: PHYSICIAN ASSISTANT

## 2023-11-21 PROCEDURE — 83036 HEMOGLOBIN GLYCOSYLATED A1C: CPT | Performed by: PHYSICIAN ASSISTANT

## 2023-11-21 PROCEDURE — 2022F DILAT RTA XM EVC RTNOPTHY: CPT | Performed by: PHYSICIAN ASSISTANT

## 2023-11-21 PROCEDURE — G8484 FLU IMMUNIZE NO ADMIN: HCPCS | Performed by: PHYSICIAN ASSISTANT

## 2023-11-21 PROCEDURE — 82962 GLUCOSE BLOOD TEST: CPT | Performed by: PHYSICIAN ASSISTANT

## 2023-11-21 RX ORDER — INSULIN HUMAN 100 [IU]/ML
INJECTION, SOLUTION PARENTERAL
COMMUNITY
Start: 2023-11-13

## 2023-11-21 ASSESSMENT — ENCOUNTER SYMPTOMS
WHEEZING: 0
ABDOMINAL PAIN: 0
VOMITING: 0
DIARRHEA: 0
COUGH: 0
NAUSEA: 0
RHINORRHEA: 0
EYE REDNESS: 0
SHORTNESS OF BREATH: 0
EYE PAIN: 0
SINUS PRESSURE: 0
SORE THROAT: 0

## 2023-11-21 NOTE — PATIENT INSTRUCTIONS
Endocrinology-    Check your blood sugars 4 times a day, before meals and at night  Document these numbers in a blood glucose log and bring them with you to your follow-up appointment. If you are prescribed insulin, Do not take your mealtime insulin if your blood sugars less than 120   Call our office if you have blood sugars less than 80 or greater then 300 on two or more occasions  Call our office if you have any questions regarding your blood sugars or insulin dosing regiment  Signs of low blood sugar may include tremors, feeling shaky, sweating, dizziness, confusion and weakness. Check your blood sugar immediatly if you have any of these symptoms.      The plan as discussed at your appointment-   pioglitizone/ Actos 30 mg by mouth daily  Continue Jardiance 10 mg daily   Trulicity 4.5 mg weekly  Continue Metformin 500 mg twice a day   Dexcom G7 being used  Sildenafil 100 mg p.o. daily as needed  Atorvastatin 20 mg nightly   Lovaza 4 capsules daily   Repeat labs and follow up in 4 months

## 2023-12-12 ENCOUNTER — HOSPITAL ENCOUNTER (EMERGENCY)
Age: 58
Discharge: HOME | End: 2023-12-12
Payer: COMMERCIAL

## 2023-12-12 ENCOUNTER — APPOINTMENT (OUTPATIENT)
Dept: GENERAL RADIOLOGY | Age: 58
End: 2023-12-12
Payer: COMMERCIAL

## 2023-12-12 VITALS
RESPIRATION RATE: 20 BRPM | SYSTOLIC BLOOD PRESSURE: 144 MMHG | TEMPERATURE: 97.8 F | WEIGHT: 177 LBS | HEART RATE: 100 BPM | DIASTOLIC BLOOD PRESSURE: 73 MMHG | HEIGHT: 67 IN | BODY MASS INDEX: 27.78 KG/M2 | OXYGEN SATURATION: 97 %

## 2023-12-12 DIAGNOSIS — U07.1 COVID-19: Primary | ICD-10-CM

## 2023-12-12 LAB
INFLUENZA A BY PCR: NEGATIVE
INFLUENZA B BY PCR: NEGATIVE
SARS-COV-2 RDRP RESP QL NAA+PROBE: DETECTED
STREP GRP A PCR: NEGATIVE

## 2023-12-12 PROCEDURE — 87651 STREP A DNA AMP PROBE: CPT

## 2023-12-12 PROCEDURE — 99284 EMERGENCY DEPT VISIT MOD MDM: CPT

## 2023-12-12 PROCEDURE — 87502 INFLUENZA DNA AMP PROBE: CPT

## 2023-12-12 PROCEDURE — 87635 SARS-COV-2 COVID-19 AMP PRB: CPT

## 2023-12-12 PROCEDURE — 96372 THER/PROPH/DIAG INJ SC/IM: CPT

## 2023-12-12 PROCEDURE — 6360000002 HC RX W HCPCS

## 2023-12-12 PROCEDURE — 71046 X-RAY EXAM CHEST 2 VIEWS: CPT

## 2023-12-12 PROCEDURE — 6370000000 HC RX 637 (ALT 250 FOR IP)

## 2023-12-12 RX ORDER — KETOROLAC TROMETHAMINE 15 MG/ML
30 INJECTION, SOLUTION INTRAMUSCULAR; INTRAVENOUS ONCE
Status: COMPLETED | OUTPATIENT
Start: 2023-12-12 | End: 2023-12-12

## 2023-12-12 RX ORDER — DEXAMETHASONE 6 MG/1
6 TABLET ORAL ONCE
Status: COMPLETED | OUTPATIENT
Start: 2023-12-12 | End: 2023-12-12

## 2023-12-12 RX ORDER — NAPROXEN 500 MG/1
500 TABLET ORAL 2 TIMES DAILY PRN
Qty: 60 TABLET | Refills: 0 | Status: SHIPPED | OUTPATIENT
Start: 2023-12-12

## 2023-12-12 RX ORDER — GUAIFENESIN 600 MG/1
600 TABLET, EXTENDED RELEASE ORAL 2 TIMES DAILY
Qty: 30 TABLET | Refills: 0 | Status: SHIPPED | OUTPATIENT
Start: 2023-12-12 | End: 2023-12-27

## 2023-12-12 RX ADMIN — GUAIFENESIN, DEXTROMETHORPHAN HBR 1 TABLET: 600; 30 TABLET ORAL at 18:51

## 2023-12-12 RX ADMIN — DEXAMETHASONE 6 MG: 6 TABLET ORAL at 18:18

## 2023-12-12 RX ADMIN — KETOROLAC TROMETHAMINE 30 MG: 15 INJECTION, SOLUTION INTRAMUSCULAR; INTRAVENOUS at 18:18

## 2023-12-12 ASSESSMENT — ENCOUNTER SYMPTOMS
VOMITING: 0
ABDOMINAL PAIN: 0
DIARRHEA: 0
COUGH: 1
SINUS PRESSURE: 1
SHORTNESS OF BREATH: 0
SINUS PAIN: 1
PHOTOPHOBIA: 0
NAUSEA: 1

## 2023-12-12 ASSESSMENT — LIFESTYLE VARIABLES
HOW MANY STANDARD DRINKS CONTAINING ALCOHOL DO YOU HAVE ON A TYPICAL DAY: PATIENT DOES NOT DRINK
HOW OFTEN DO YOU HAVE A DRINK CONTAINING ALCOHOL: NEVER

## 2023-12-12 ASSESSMENT — PAIN - FUNCTIONAL ASSESSMENT: PAIN_FUNCTIONAL_ASSESSMENT: 0-10

## 2023-12-12 ASSESSMENT — PAIN SCALES - GENERAL
PAINLEVEL_OUTOF10: 6
PAINLEVEL_OUTOF10: 6

## 2023-12-12 ASSESSMENT — PAIN DESCRIPTION - DESCRIPTORS
DESCRIPTORS: ACHING
DESCRIPTORS: ACHING

## 2023-12-12 ASSESSMENT — PAIN DESCRIPTION - LOCATION: LOCATION: GENERALIZED

## 2023-12-12 NOTE — ED TRIAGE NOTES
Pt presents to ER from home with flu like symptoms for the last week that has not seemed to get any better. Pt has not taken any medication since this morning. Pt is A&Ox4, warm and dry at this time with vitals stable.

## 2023-12-12 NOTE — ED NOTES
Pt states he has had a cough for over 1 week along with congestion, fatigue, and a scratchy throat.       Patt Puentes RN  12/12/23 [id number]

## 2024-01-16 RX ORDER — SYRINGE-NEEDLE,INSULIN,0.5 ML 27GX1/2"
SYRINGE, EMPTY DISPOSABLE MISCELLANEOUS
Qty: 200 EACH | Refills: 3 | OUTPATIENT
Start: 2024-01-16

## 2024-01-16 RX ORDER — METFORMIN HYDROCHLORIDE 500 MG/1
500 TABLET, EXTENDED RELEASE ORAL 2 TIMES DAILY
Qty: 60 TABLET | Refills: 5 | Status: SHIPPED | OUTPATIENT
Start: 2024-01-16

## 2024-01-16 RX ORDER — DULAGLUTIDE 4.5 MG/.5ML
INJECTION, SOLUTION SUBCUTANEOUS
Qty: 8 ML | Refills: 3 | Status: SHIPPED | OUTPATIENT
Start: 2024-01-16

## 2024-01-16 NOTE — TELEPHONE ENCOUNTER
Patient requesting medication refill. Please approve or deny this request.    Rx requested:  Requested Prescriptions     Pending Prescriptions Disp Refills    metFORMIN (GLUCOPHAGE-XR) 500 MG extended release tablet 60 tablet 5     Sig: Take 1 tablet by mouth 2 times daily    Dulaglutide (TRULICITY) 4.5 MG/0.5ML SOPN 8 mL 3     Sig: inject 4.5 milligram subcutaneously ONCE A WEEK    Insulin Pen Needle 32G X 6 MM MISC 100 each 3     Si Device by Does not apply route 3 times daily    empagliflozin (JARDIANCE) 10 MG tablet 90 tablet 3     Sig: take 1 tablet by mouth once daily    Insulin Syringe-Needle U-100 (BD INSULIN SYRINGE U/F) 31G X 5/16\" 0.5 ML MISC 200 each 3     Sig: use four times a day         Last Office Visit:   2023      Next Visit Date:  Future Appointments   Date Time Provider Department Center   2024 10:30 AM Cr, Mendel S, PA [facility]      Pt titrated down to 3L O2 via NC. Tolerating well. Pt updated on POC. Denies needs at this time.       Leona Opitz, RN  08/30/21 [id number]

## 2024-03-21 ASSESSMENT — PATIENT HEALTH QUESTIONNAIRE - PHQ9
SUM OF ALL RESPONSES TO PHQ QUESTIONS 1-9: 0
SUM OF ALL RESPONSES TO PHQ QUESTIONS 1-9: 0
1. LITTLE INTEREST OR PLEASURE IN DOING THINGS: NOT AT ALL
SUM OF ALL RESPONSES TO PHQ QUESTIONS 1-9: 0
SUM OF ALL RESPONSES TO PHQ QUESTIONS 1-9: 0
SUM OF ALL RESPONSES TO PHQ9 QUESTIONS 1 & 2: 0
2. FEELING DOWN, DEPRESSED OR HOPELESS: NOT AT ALL
SUM OF ALL RESPONSES TO PHQ9 QUESTIONS 1 & 2: 0
1. LITTLE INTEREST OR PLEASURE IN DOING THINGS: NOT AT ALL
2. FEELING DOWN, DEPRESSED OR HOPELESS: NOT AT ALL

## 2024-03-22 ENCOUNTER — OFFICE VISIT (OUTPATIENT)
Dept: FAMILY MEDICINE CLINIC | Age: 59
End: 2024-03-22
Payer: COMMERCIAL

## 2024-03-22 VITALS
WEIGHT: 174 LBS | OXYGEN SATURATION: 99 % | HEART RATE: 97 BPM | BODY MASS INDEX: 27.31 KG/M2 | DIASTOLIC BLOOD PRESSURE: 80 MMHG | HEIGHT: 67 IN | SYSTOLIC BLOOD PRESSURE: 120 MMHG | TEMPERATURE: 97.8 F

## 2024-03-22 DIAGNOSIS — K21.9 GASTROESOPHAGEAL REFLUX DISEASE WITHOUT ESOPHAGITIS: Primary | ICD-10-CM

## 2024-03-22 PROCEDURE — 99213 OFFICE O/P EST LOW 20 MIN: CPT | Performed by: FAMILY MEDICINE

## 2024-03-22 PROCEDURE — 3017F COLORECTAL CA SCREEN DOC REV: CPT | Performed by: FAMILY MEDICINE

## 2024-03-22 PROCEDURE — G8427 DOCREV CUR MEDS BY ELIG CLIN: HCPCS | Performed by: FAMILY MEDICINE

## 2024-03-22 PROCEDURE — G8484 FLU IMMUNIZE NO ADMIN: HCPCS | Performed by: FAMILY MEDICINE

## 2024-03-22 PROCEDURE — G8419 CALC BMI OUT NRM PARAM NOF/U: HCPCS | Performed by: FAMILY MEDICINE

## 2024-03-22 PROCEDURE — 1036F TOBACCO NON-USER: CPT | Performed by: FAMILY MEDICINE

## 2024-03-22 RX ORDER — PANTOPRAZOLE SODIUM 40 MG/1
40 TABLET, DELAYED RELEASE ORAL
Qty: 30 TABLET | Refills: 0 | Status: SHIPPED | OUTPATIENT
Start: 2024-03-22

## 2024-03-22 SDOH — ECONOMIC STABILITY: FOOD INSECURITY: WITHIN THE PAST 12 MONTHS, THE FOOD YOU BOUGHT JUST DIDN'T LAST AND YOU DIDN'T HAVE MONEY TO GET MORE.: NEVER TRUE

## 2024-03-22 SDOH — ECONOMIC STABILITY: FOOD INSECURITY: WITHIN THE PAST 12 MONTHS, YOU WORRIED THAT YOUR FOOD WOULD RUN OUT BEFORE YOU GOT MONEY TO BUY MORE.: NEVER TRUE

## 2024-03-22 SDOH — ECONOMIC STABILITY: INCOME INSECURITY: HOW HARD IS IT FOR YOU TO PAY FOR THE VERY BASICS LIKE FOOD, HOUSING, MEDICAL CARE, AND HEATING?: NOT HARD AT ALL

## 2024-03-22 SDOH — ECONOMIC STABILITY: HOUSING INSECURITY
IN THE LAST 12 MONTHS, WAS THERE A TIME WHEN YOU DID NOT HAVE A STEADY PLACE TO SLEEP OR SLEPT IN A SHELTER (INCLUDING NOW)?: NO

## 2024-03-22 ASSESSMENT — ENCOUNTER SYMPTOMS: DIARRHEA: 0

## 2024-03-22 NOTE — PROGRESS NOTES
Subjective:      Patient ID: Adi Navarro is a 58 y.o. male    HPI  Here with 3 weeks of increased reflux symptoms.  Frequently feeling acid taste in mouth.  No emesis.  No swallowing problems.   No weight changes. No changes with trulicity dose recently    Review of Systems   Constitutional:  Negative for fever and unexpected weight change.   Gastrointestinal:  Negative for diarrhea.   Skin:  Negative for rash.   Neurological:  Negative for weakness.     Reviewed allergy, medical, social, surgical, family and med list changes and updated   Files--     Social History     Socioeconomic History    Marital status:    Tobacco Use    Smoking status: Never    Smokeless tobacco: Never   Substance and Sexual Activity    Alcohol use: Yes     Comment: occasional    Drug use: No     Social Determinants of Health     Financial Resource Strain: Low Risk  (3/22/2024)    Overall Financial Resource Strain (CARDIA)     Difficulty of Paying Living Expenses: Not hard at all   Food Insecurity: No Food Insecurity (3/22/2024)    Hunger Vital Sign     Worried About Running Out of Food in the Last Year: Never true     Ran Out of Food in the Last Year: Never true   Transportation Needs: Unknown (3/22/2024)    PRAPARE - Transportation     Lack of Transportation (Non-Medical): No   Housing Stability: Unknown (3/22/2024)    Housing Stability Vital Sign     Unstable Housing in the Last Year: No     Current Outpatient Medications   Medication Sig Dispense Refill    metFORMIN (GLUCOPHAGE-XR) 500 MG extended release tablet Take 1 tablet by mouth 2 times daily 60 tablet 5    Dulaglutide (TRULICITY) 4.5 MG/0.5ML SOPN inject 4.5 milligram subcutaneously ONCE A WEEK 8 mL 3    Insulin Pen Needle 32G X 6 MM MISC 1 Device by Does not apply route 3 times daily 100 each 3    empagliflozin (JARDIANCE) 10 MG tablet take 1 tablet by mouth once daily 90 tablet 3    naproxen (NAPROSYN) 500 MG tablet Take 1 tablet by mouth 2 times daily as needed for

## 2024-04-15 ENCOUNTER — TELEPHONE (OUTPATIENT)
Dept: FAMILY MEDICINE CLINIC | Age: 59
End: 2024-04-15

## 2024-04-15 RX ORDER — DULAGLUTIDE 4.5 MG/.5ML
INJECTION, SOLUTION SUBCUTANEOUS
Qty: 8 ML | Refills: 3 | Status: SHIPPED | OUTPATIENT
Start: 2024-04-15 | End: 2024-04-17 | Stop reason: SDUPTHER

## 2024-04-15 RX ORDER — PANTOPRAZOLE SODIUM 40 MG/1
40 TABLET, DELAYED RELEASE ORAL
Qty: 90 TABLET | Refills: 0 | Status: SHIPPED | OUTPATIENT
Start: 2024-04-15

## 2024-04-15 NOTE — TELEPHONE ENCOUNTER
Future Appointments    Encounter Information   Provider Department Appt Notes   [facility]     Past Visits    Date Provider Specialty Visit Type Primary Dx   03/22/2024 Kd Zheng MD Family Medicine Office Visit Gastroesophageal reflux disease without esophagitis

## 2024-04-15 NOTE — TELEPHONE ENCOUNTER
Called patient per message below, phone # not accepting calls.              Appointment Request From: Adi Navarro     With Provider: Kd Zheng MD [[facility]]     Preferred Date Range: 4/15/2024 - 4/26/2024     Preferred Times: Any Time     Reason for visit: Request an Appointment     Comments:  Re schedule rachelle.

## 2024-04-17 RX ORDER — DULAGLUTIDE 4.5 MG/.5ML
INJECTION, SOLUTION SUBCUTANEOUS
Qty: 8 ML | Refills: 3 | Status: SHIPPED | OUTPATIENT
Start: 2024-04-17

## 2024-04-30 NOTE — CONSULTS
[id number] dr Emy Ryan paged  [phone] 2nd page  [phone] 3rd page  [phone] dr Emy Ryan returned call MEDICATIONS  (STANDING):  aspirin  chewable 81 milliGRAM(s) Oral daily  atorvastatin 80 milliGRAM(s) Oral at bedtime  clopidogrel Tablet 75 milliGRAM(s) Enteral Tube daily  dextrose 10% Bolus 125 milliLiter(s) IV Bolus once  dextrose 5%. 1000 milliLiter(s) (50 mL/Hr) IV Continuous <Continuous>  dextrose 5%. 1000 milliLiter(s) (100 mL/Hr) IV Continuous <Continuous>  dextrose 50% Injectable 25 Gram(s) IV Push once  dextrose 50% Injectable 12.5 Gram(s) IV Push once  enoxaparin Injectable 40 milliGRAM(s) SubCutaneous every 24 hours  ferrous    sulfate 325 milliGRAM(s) Oral daily  folic acid 1 milliGRAM(s) Oral daily  glucagon  Injectable 1 milliGRAM(s) IntraMuscular once  insulin lispro (ADMELOG) corrective regimen sliding scale   SubCutaneous every 6 hours  magnesium oxide 400 milliGRAM(s) Oral daily  midodrine. 2.5 milliGRAM(s) Oral <User Schedule>  polyethylene glycol 3350 17 Gram(s) Oral daily  sodium chloride 0.9%. 1000 milliLiter(s) (50 mL/Hr) IV Continuous <Continuous>  sodium chloride 0.9%. 1000 milliLiter(s) (100 mL/Hr) IV Continuous <Continuous>    MEDICATIONS  (PRN):  dextrose Oral Gel 15 Gram(s) Oral once PRN Blood Glucose LESS THAN 70 milliGRAM(s)/deciliter

## 2024-05-09 RX ORDER — PANTOPRAZOLE SODIUM 40 MG/1
40 TABLET, DELAYED RELEASE ORAL
Qty: 30 TABLET | Refills: 0 | OUTPATIENT
Start: 2024-05-09

## 2024-05-10 RX ORDER — ACYCLOVIR 400 MG/1
TABLET ORAL
Qty: 3 EACH | Refills: 5 | Status: SHIPPED | OUTPATIENT
Start: 2024-05-10

## 2024-05-15 ENCOUNTER — TELEPHONE (OUTPATIENT)
Dept: ENDOCRINOLOGY | Age: 59
End: 2024-05-15

## 2024-05-15 DIAGNOSIS — E11.65 TYPE 2 DIABETES MELLITUS WITH HYPERGLYCEMIA, UNSPECIFIED WHETHER LONG TERM INSULIN USE (HCC): ICD-10-CM

## 2024-05-15 LAB
ALBUMIN SERPL-MCNC: 4.5 G/DL (ref 3.5–4.6)
ALP SERPL-CCNC: 115 U/L (ref 35–104)
ALT SERPL-CCNC: 19 U/L (ref 0–41)
ANION GAP SERPL CALCULATED.3IONS-SCNC: 15 MEQ/L (ref 9–15)
AST SERPL-CCNC: 30 U/L (ref 0–40)
BILIRUB SERPL-MCNC: 0.6 MG/DL (ref 0.2–0.7)
BUN SERPL-MCNC: 21 MG/DL (ref 6–20)
CALCIUM SERPL-MCNC: 9.6 MG/DL (ref 8.5–9.9)
CHLORIDE SERPL-SCNC: 105 MEQ/L (ref 95–107)
CHOLEST SERPL-MCNC: 183 MG/DL (ref 0–199)
CO2 SERPL-SCNC: 22 MEQ/L (ref 20–31)
CREAT SERPL-MCNC: 1.39 MG/DL (ref 0.7–1.2)
GLOBULIN SER CALC-MCNC: 3.2 G/DL (ref 2.3–3.5)
GLUCOSE SERPL-MCNC: 141 MG/DL (ref 70–99)
HDLC SERPL-MCNC: 42 MG/DL (ref 40–59)
LDLC SERPL CALC-MCNC: 101 MG/DL (ref 0–129)
POTASSIUM SERPL-SCNC: 4.6 MEQ/L (ref 3.4–4.9)
PROT SERPL-MCNC: 7.7 G/DL (ref 6.3–8)
SODIUM SERPL-SCNC: 142 MEQ/L (ref 135–144)
TRIGL SERPL-MCNC: 198 MG/DL (ref 0–150)

## 2024-05-15 RX ORDER — GLIMEPIRIDE 4 MG/1
4 TABLET ORAL EVERY MORNING
Qty: 30 TABLET | Refills: 3 | Status: SHIPPED | OUTPATIENT
Start: 2024-05-15

## 2024-05-15 NOTE — PROGRESS NOTES
Patient calls today stating that he is unable to get Trulicity due to the shortage, his glucose levels are running up into the 200s.  I am going to prescribed him glimepiride 4 mg daily until Trulicity becomes available.

## 2024-05-15 NOTE — TELEPHONE ENCOUNTER
Patient's wife called in today to let us know that they have been unable to find the Trulicity 4.5 mg. Patient has already noticed an increase in blood sugars from the 100s to the upper 200s. They have been advised to continue to call their pharmacy to check to see if they have the trulicity in stock.  Please advise if you want him to change any medications  in the meantime.  Patient has an appointment with Mendel on 05/21/2024.    Please advise patient at [phone]

## 2024-05-16 LAB
ESTIMATED AVERAGE GLUCOSE: 154 MG/DL
HBA1C MFR BLD: 7 % (ref 4–6)

## 2024-05-21 ENCOUNTER — OFFICE VISIT (OUTPATIENT)
Dept: ENDOCRINOLOGY | Age: 59
End: 2024-05-21
Payer: COMMERCIAL

## 2024-05-21 VITALS
HEART RATE: 90 BPM | DIASTOLIC BLOOD PRESSURE: 81 MMHG | OXYGEN SATURATION: 97 % | WEIGHT: 166 LBS | HEIGHT: 67 IN | BODY MASS INDEX: 26.06 KG/M2 | SYSTOLIC BLOOD PRESSURE: 126 MMHG

## 2024-05-21 DIAGNOSIS — E11.65 TYPE 2 DIABETES MELLITUS WITH HYPERGLYCEMIA, UNSPECIFIED WHETHER LONG TERM INSULIN USE (HCC): Primary | ICD-10-CM

## 2024-05-21 LAB
CHP ED QC CHECK: NORMAL
GLUCOSE BLD-MCNC: 124 MG/DL

## 2024-05-21 PROCEDURE — 2022F DILAT RTA XM EVC RTNOPTHY: CPT | Performed by: PHYSICIAN ASSISTANT

## 2024-05-21 PROCEDURE — G8427 DOCREV CUR MEDS BY ELIG CLIN: HCPCS | Performed by: PHYSICIAN ASSISTANT

## 2024-05-21 PROCEDURE — 82962 GLUCOSE BLOOD TEST: CPT | Performed by: PHYSICIAN ASSISTANT

## 2024-05-21 PROCEDURE — 3051F HG A1C>EQUAL 7.0%<8.0%: CPT | Performed by: PHYSICIAN ASSISTANT

## 2024-05-21 PROCEDURE — 1036F TOBACCO NON-USER: CPT | Performed by: PHYSICIAN ASSISTANT

## 2024-05-21 PROCEDURE — 95251 CONT GLUC MNTR ANALYSIS I&R: CPT | Performed by: PHYSICIAN ASSISTANT

## 2024-05-21 PROCEDURE — 99214 OFFICE O/P EST MOD 30 MIN: CPT | Performed by: PHYSICIAN ASSISTANT

## 2024-05-21 PROCEDURE — 3017F COLORECTAL CA SCREEN DOC REV: CPT | Performed by: PHYSICIAN ASSISTANT

## 2024-05-21 PROCEDURE — G8419 CALC BMI OUT NRM PARAM NOF/U: HCPCS | Performed by: PHYSICIAN ASSISTANT

## 2024-05-21 RX ORDER — SYRINGE-NEEDLE,INSULIN,0.5 ML 27GX1/2"
1 SYRINGE, EMPTY DISPOSABLE MISCELLANEOUS 3 TIMES DAILY
Qty: 120 EACH | Refills: 3 | Status: SHIPPED | OUTPATIENT
Start: 2024-05-21

## 2024-05-21 RX ORDER — GLIMEPIRIDE 4 MG/1
4 TABLET ORAL EVERY MORNING
Qty: 30 TABLET | Refills: 3 | Status: SHIPPED | OUTPATIENT
Start: 2024-05-21

## 2024-05-21 ASSESSMENT — ENCOUNTER SYMPTOMS
DIARRHEA: 0
ABDOMINAL PAIN: 0
NAUSEA: 0
SORE THROAT: 0
WHEEZING: 0
SHORTNESS OF BREATH: 0
EYE PAIN: 0
EYE REDNESS: 0
COUGH: 0
VOMITING: 0
SINUS PRESSURE: 0
RHINORRHEA: 0

## 2024-05-21 NOTE — PATIENT INSTRUCTIONS
Endocrinology-    Check your blood sugars 4 times a day, before meals and at night  Document these numbers in a blood glucose log and bring them with you to your follow-up appointment.  If you are prescribed insulin, Do not take your mealtime insulin if your blood sugars less than 120   Call our office if you have blood sugars less than 80 or greater then 300 on two or more occasions  Call our office if you have any questions regarding your blood sugars or insulin dosing regiment  Signs of low blood sugar may include tremors, feeling shaky, sweating, dizziness, confusion and weakness. Check your blood sugar immediatly if you have any of these symptoms.     The plan as discussed at your appointment-   pioglitizone/ Actos 30 mg by mouth daily  Continue Jardiance 10 mg daily   Trulicity 4.5 mg weekly  Continue Metformin 500 mg twice a day   Dexcom G7 being used  Sildenafil 100 mg p.o. daily as needed  Atorvastatin 20 mg nightly   Lovaza 4 capsules daily   Repeat labs and follow up in 4 months     You have been prescribed the Dexcom G7 continuous glucose monitor (CGM).  This device reads your glucose levels in the interstitial fluid under your skin. This is not a blood glucose monitor.  The concentration of glucose in your blood is sometimes slightly higher than the concentration of glucose in the fluid under your skin.  This is a normal variation and is usually only a 5-15 difference.  You may notice a greater difference in the numbers between the blood and the device immediately after eating or activity.  This again is a normal variance and the levels will usually equal out over time.  You can use the number on the Dexcom CGM to monitor your glucose and take your medications or insulin.  If, for example, you get a low or high reading on the Dexcom and are asymptomatic, do a fingerstick and check your blood glucose to verify.  If there is a large discrepancy, use the blood glucose number and treat the number

## 2024-05-21 NOTE — PROGRESS NOTES
Assessment:       Diagnosis Orders   1. Type 2 diabetes mellitus with hyperglycemia, unspecified whether long term insulin use (HCC)  Comprehensive Metabolic Panel    Hemoglobin A1C    POCT Glucose    Microalbumin / Creatinine Urine Ratio     DIABETES FOOT EXAM    MO CONTINUOUS GLUCOSE MONITORING ANALYSIS I&R        Average blood glucose 100-140 without significant hyper or hypoglycemic events    PLAN:     pioglitizone/ Actos 30 mg by mouth daily  Continue Jardiance 10 mg daily   Trulicity 4.5 mg weekly  Continue Metformin 500 mg twice a day   Dexcom G7 being used  Sildenafil 100 mg p.o. daily as needed  Atorvastatin 20 mg nightly   Lovaza 4 capsules daily   Repeat labs and follow up in 4 months       Orders Placed This Encounter   Procedures    Comprehensive Metabolic Panel     Standing Status:   Future     Standing Expiration Date:   2025    Hemoglobin A1C     Standing Status:   Future     Standing Expiration Date:   2025    Microalbumin / Creatinine Urine Ratio     Standing Status:   Future     Standing Expiration Date:   2025    POCT Glucose     DIABETES FOOT EXAM    MO CONTINUOUS GLUCOSE MONITORING ANALYSIS I&R     Orders Placed This Encounter   Medications    Insulin Syringe-Needle U-100 31G X 5/16\" 1 ML MISC     Si each by Does not apply route 3 times daily May substitute for generic or insurance covered product     Dispense:  120 each     Refill:  3    glimepiride (AMARYL) 4 MG tablet     Sig: Take 1 tablet by mouth every morning     Dispense:  30 tablet     Refill:  3       Return in about 4 months (around 2024) for Diabetes.    Subjective:     Chief Complaint   Patient presents with    Diabetes     Vitals:    24 1019   BP: 126/81   Pulse: 90   SpO2: 97%   Weight: 75.3 kg (166 lb)   Height: 1.702 m (5' 7\")       Wt Readings from Last 3 Encounters:   24 75.3 kg (166 lb)   24 78.9 kg (174 lb)   23 80.3 kg (177 lb)     BP Readings from Last 3 Encounters:

## 2024-06-07 RX ORDER — DULAGLUTIDE 4.5 MG/.5ML
INJECTION, SOLUTION SUBCUTANEOUS
Qty: 8 ML | Refills: 3 | Status: SHIPPED | OUTPATIENT
Start: 2024-06-07

## 2024-06-24 RX ORDER — DULAGLUTIDE 4.5 MG/.5ML
INJECTION, SOLUTION SUBCUTANEOUS
Qty: 8 ML | Refills: 3 | Status: SHIPPED | OUTPATIENT
Start: 2024-06-24

## 2024-07-09 RX ORDER — DULAGLUTIDE 4.5 MG/.5ML
INJECTION, SOLUTION SUBCUTANEOUS
Qty: 8 ML | Refills: 3 | Status: SHIPPED | OUTPATIENT
Start: 2024-07-09

## 2024-07-26 RX ORDER — PANTOPRAZOLE SODIUM 40 MG/1
40 TABLET, DELAYED RELEASE ORAL
Qty: 90 TABLET | Refills: 0 | Status: SHIPPED | OUTPATIENT
Start: 2024-07-26

## 2024-08-22 RX ORDER — DULAGLUTIDE 4.5 MG/.5ML
INJECTION, SOLUTION SUBCUTANEOUS
Qty: 8 ML | Refills: 3 | Status: SHIPPED | OUTPATIENT
Start: 2024-08-22

## 2024-09-03 RX ORDER — GLIMEPIRIDE 4 MG/1
4 TABLET ORAL EVERY MORNING
Qty: 30 TABLET | Refills: 3 | Status: SHIPPED | OUTPATIENT
Start: 2024-09-03

## 2024-09-24 ENCOUNTER — OFFICE VISIT (OUTPATIENT)
Dept: ENDOCRINOLOGY | Age: 59
End: 2024-09-24

## 2024-09-24 VITALS
HEART RATE: 74 BPM | BODY MASS INDEX: 26.06 KG/M2 | SYSTOLIC BLOOD PRESSURE: 127 MMHG | WEIGHT: 166 LBS | DIASTOLIC BLOOD PRESSURE: 76 MMHG | OXYGEN SATURATION: 96 % | HEIGHT: 67 IN

## 2024-09-24 DIAGNOSIS — E11.65 TYPE 2 DIABETES MELLITUS WITH HYPERGLYCEMIA, UNSPECIFIED WHETHER LONG TERM INSULIN USE (HCC): ICD-10-CM

## 2024-09-24 DIAGNOSIS — E11.65 TYPE 2 DIABETES MELLITUS WITH HYPERGLYCEMIA, UNSPECIFIED WHETHER LONG TERM INSULIN USE (HCC): Primary | ICD-10-CM

## 2024-09-24 LAB
ALBUMIN SERPL-MCNC: 4.4 G/DL (ref 3.5–4.6)
ALP SERPL-CCNC: 114 U/L (ref 35–104)
ALT SERPL-CCNC: 15 U/L (ref 0–41)
ANION GAP SERPL CALCULATED.3IONS-SCNC: 11 MEQ/L (ref 9–15)
AST SERPL-CCNC: 29 U/L (ref 0–40)
BILIRUB SERPL-MCNC: 0.7 MG/DL (ref 0.2–0.7)
BUN SERPL-MCNC: 14 MG/DL (ref 6–20)
CALCIUM SERPL-MCNC: 9.2 MG/DL (ref 8.5–9.9)
CHLORIDE SERPL-SCNC: 100 MEQ/L (ref 95–107)
CHP ED QC CHECK: NORMAL
CO2 SERPL-SCNC: 24 MEQ/L (ref 20–31)
CREAT SERPL-MCNC: 0.99 MG/DL (ref 0.7–1.2)
CREAT UR-MCNC: 211.4 MG/DL
ESTIMATED AVERAGE GLUCOSE: 140 MG/DL
GLOBULIN SER CALC-MCNC: 3.2 G/DL (ref 2.3–3.5)
GLUCOSE BLD-MCNC: 94 MG/DL
GLUCOSE SERPL-MCNC: 103 MG/DL (ref 70–99)
HBA1C MFR BLD: 6.2 %
HBA1C MFR BLD: 6.5 % (ref 4–6)
MICROALBUMIN UR-MCNC: 32 MG/DL
MICROALBUMIN/CREAT UR-RTO: 151.4 MG/G (ref 0–30)
POTASSIUM SERPL-SCNC: 3.9 MEQ/L (ref 3.4–4.9)
PROT SERPL-MCNC: 7.6 G/DL (ref 6.3–8)
SODIUM SERPL-SCNC: 135 MEQ/L (ref 135–144)

## 2024-09-24 ASSESSMENT — ENCOUNTER SYMPTOMS
NAUSEA: 0
RHINORRHEA: 0
SHORTNESS OF BREATH: 0
VOMITING: 0
ABDOMINAL PAIN: 0
SORE THROAT: 0
WHEEZING: 0
EYE PAIN: 0
EYE REDNESS: 0
COUGH: 0
DIARRHEA: 0
SINUS PRESSURE: 0

## 2024-10-14 ENCOUNTER — HOSPITAL ENCOUNTER (EMERGENCY)
Age: 59
Discharge: HOME | End: 2024-10-14
Payer: COMMERCIAL

## 2024-10-14 VITALS
DIASTOLIC BLOOD PRESSURE: 97 MMHG | BODY MASS INDEX: 26.06 KG/M2 | HEART RATE: 96 BPM | RESPIRATION RATE: 13 BRPM | HEIGHT: 67 IN | OXYGEN SATURATION: 95 % | SYSTOLIC BLOOD PRESSURE: 159 MMHG | WEIGHT: 166 LBS | TEMPERATURE: 98.6 F

## 2024-10-14 DIAGNOSIS — E16.2 HYPOGLYCEMIA: Primary | ICD-10-CM

## 2024-10-14 LAB
GLUCOSE BLD-MCNC: 113 MG/DL (ref 70–99)
GLUCOSE BLD-MCNC: 64 MG/DL (ref 70–99)
PERFORMED ON: ABNORMAL
PERFORMED ON: ABNORMAL

## 2024-10-14 PROCEDURE — 99282 EMERGENCY DEPT VISIT SF MDM: CPT

## 2024-10-14 ASSESSMENT — LIFESTYLE VARIABLES
HOW MANY STANDARD DRINKS CONTAINING ALCOHOL DO YOU HAVE ON A TYPICAL DAY: 1 OR 2
HOW OFTEN DO YOU HAVE A DRINK CONTAINING ALCOHOL: MONTHLY OR LESS

## 2024-10-14 ASSESSMENT — PAIN - FUNCTIONAL ASSESSMENT: PAIN_FUNCTIONAL_ASSESSMENT: NONE - DENIES PAIN

## 2024-10-14 NOTE — ED PROVIDER NOTES
5:12 AM EDT  [facility] ED  EMERGENCY DEPARTMENT ENCOUNTER      Pt Name: Adi Navarro  MRN: [id number]  Birthdate 1965  Date of evaluation: 10/14/2024  Provider: So Vivas MD    CHIEF COMPLAINT       Chief Complaint   Patient presents with    Hypoglycemia         HISTORY OF PRESENT ILLNESS   (Location/Symptom, Timing/Onset, Context/Setting, Quality, Duration, Modifying Factors, Severity)  Note limiting factors.       Adi Navarro is a 58 y.o. male who presents to the emergency department for a chief complaint of hypoglycemia.  Patient states that he was woken up by his Dexcom, with a low blood sugar alert.  Patient states his blood sugar was 65.  Patient states he came to the emergency department because after noticing that, patient became sweaty and felt tired.  Patient did eat some peanut butter en route.  Patient states that he had some recent medication adjustments and was put on full dose Jardiance recently, patient states that he has had 1 other episode of low blood sugar in the past.  Patient states that he has an upcoming appointment to get his medication dose adjusted The history is provided by the patient and the spouse.       Nursing Notes were reviewed.    REVIEW OF SYSTEMS    (2-9 systems for level 4, 10 or more for level 5)     Review of Systems    Except as noted above the remainder of the review of systems was reviewed and negative.       PAST MEDICAL HISTORY     Past Medical History:   Diagnosis Date    Acid reflux disease with ulcer 03/19/2018    Bilateral carpal tunnel syndrome 9/24/2020    Diabetes mellitus (HCC)     BUCKLEY (dyspnea on exertion) 2/2/2018    Hypertension     Mixed hyperlipidemia 2/2/2018         SURGICAL HISTORY       Past Surgical History:   Procedure Laterality Date    CYSTOSCOPY Right 12/13/2017    CYSTOSCOPY RIGHT  RETROGRADE PYELOGRAM AND REMOVAL OF RIGHT DJ STENT (LABS DONE 11/27) performed by Julian Sarmiento MD at [facility] [place]    CYSTOSCOPY

## 2024-10-14 NOTE — ED TRIAGE NOTES
Patient to ER with reports of hypoglycemia this morning he woke up at 43. He then ate and it is now 81 according to his machine. He states this happened the same last Monday. He reports he was restarted 2 weeks ago on his Trulicity at his previous full strength dose, and states his doctor had considered starting him on a \"medium\" dose instead since he had been off of it for months. No current symptoms in triage at this time.

## 2024-12-05 RX ORDER — PANTOPRAZOLE SODIUM 40 MG/1
40 TABLET, DELAYED RELEASE ORAL
Qty: 90 TABLET | Refills: 0 | Status: SHIPPED | OUTPATIENT
Start: 2024-12-05

## 2024-12-12 RX ORDER — METFORMIN HYDROCHLORIDE 500 MG/1
500 TABLET, EXTENDED RELEASE ORAL 2 TIMES DAILY
Qty: 60 TABLET | Refills: 5 | Status: SHIPPED | OUTPATIENT
Start: 2024-12-12

## 2024-12-13 NOTE — TELEPHONE ENCOUNTER
Comments: Patient request    Last Office Visit (last PCP visit):   9/24/2024    Next Visit Date:  Future Appointments   Date Time Provider Department Center   1/28/2025  9:30 AM Cr, Mendel S, PA [facility]   3/25/2025 10:45 AM Kd Zheng MD [facility]       If hasn't been seen in over a year OR hasn't followed up according to last diabetes/ADHD visit, make appointment for patient before sending refill to provider.    Rx requested:  Requested Prescriptions     Pending Prescriptions Disp Refills    Dulaglutide (TRULICITY) 4.5 MG/0.5ML SOAJ       Sig: Inject 4.5 mg weekly

## 2024-12-14 RX ORDER — DULAGLUTIDE 4.5 MG/.5ML
INJECTION, SOLUTION SUBCUTANEOUS
Qty: 2 ML | Refills: 5 | Status: SHIPPED | OUTPATIENT
Start: 2024-12-14

## 2025-01-07 RX ORDER — ACYCLOVIR 400 MG/1
TABLET ORAL
Qty: 3 EACH | Refills: 5 | Status: SHIPPED | OUTPATIENT
Start: 2025-01-07

## 2025-01-13 RX ORDER — DULAGLUTIDE 4.5 MG/.5ML
INJECTION, SOLUTION SUBCUTANEOUS
Qty: 2 ML | Refills: 5 | Status: SHIPPED | OUTPATIENT
Start: 2025-01-13

## 2025-01-13 RX ORDER — ACYCLOVIR 400 MG/1
TABLET ORAL
Qty: 3 EACH | Refills: 5 | Status: SHIPPED | OUTPATIENT
Start: 2025-01-13

## 2025-01-14 RX ORDER — PANTOPRAZOLE SODIUM 40 MG/1
40 TABLET, DELAYED RELEASE ORAL
Qty: 90 TABLET | Refills: 0 | Status: SHIPPED | OUTPATIENT
Start: 2025-01-14 | End: 2025-01-22 | Stop reason: SDUPTHER

## 2025-01-14 SDOH — ECONOMIC STABILITY: FOOD INSECURITY: WITHIN THE PAST 12 MONTHS, YOU WORRIED THAT YOUR FOOD WOULD RUN OUT BEFORE YOU GOT MONEY TO BUY MORE.: SOMETIMES TRUE

## 2025-01-14 SDOH — ECONOMIC STABILITY: INCOME INSECURITY: IN THE LAST 12 MONTHS, WAS THERE A TIME WHEN YOU WERE NOT ABLE TO PAY THE MORTGAGE OR RENT ON TIME?: YES

## 2025-01-14 SDOH — ECONOMIC STABILITY: TRANSPORTATION INSECURITY
IN THE PAST 12 MONTHS, HAS LACK OF TRANSPORTATION KEPT YOU FROM MEETINGS, WORK, OR FROM GETTING THINGS NEEDED FOR DAILY LIVING?: NO

## 2025-01-14 SDOH — ECONOMIC STABILITY: FOOD INSECURITY: WITHIN THE PAST 12 MONTHS, THE FOOD YOU BOUGHT JUST DIDN'T LAST AND YOU DIDN'T HAVE MONEY TO GET MORE.: SOMETIMES TRUE

## 2025-01-14 SDOH — ECONOMIC STABILITY: TRANSPORTATION INSECURITY
IN THE PAST 12 MONTHS, HAS THE LACK OF TRANSPORTATION KEPT YOU FROM MEDICAL APPOINTMENTS OR FROM GETTING MEDICATIONS?: YES

## 2025-01-14 ASSESSMENT — PATIENT HEALTH QUESTIONNAIRE - PHQ9
1. LITTLE INTEREST OR PLEASURE IN DOING THINGS: NOT AT ALL
SUM OF ALL RESPONSES TO PHQ QUESTIONS 1-9: 0
2. FEELING DOWN, DEPRESSED OR HOPELESS: NOT AT ALL
SUM OF ALL RESPONSES TO PHQ9 QUESTIONS 1 & 2: 0
SUM OF ALL RESPONSES TO PHQ QUESTIONS 1-9: 0

## 2025-01-20 ASSESSMENT — PATIENT HEALTH QUESTIONNAIRE - PHQ9
1. LITTLE INTEREST OR PLEASURE IN DOING THINGS: NOT AT ALL
2. FEELING DOWN, DEPRESSED OR HOPELESS: NOT AT ALL
SUM OF ALL RESPONSES TO PHQ9 QUESTIONS 1 & 2: 0

## 2025-01-22 ENCOUNTER — OFFICE VISIT (OUTPATIENT)
Age: 60
End: 2025-01-22
Payer: COMMERCIAL

## 2025-01-22 VITALS
SYSTOLIC BLOOD PRESSURE: 120 MMHG | DIASTOLIC BLOOD PRESSURE: 70 MMHG | BODY MASS INDEX: 25.9 KG/M2 | OXYGEN SATURATION: 97 % | HEIGHT: 67 IN | HEART RATE: 74 BPM | WEIGHT: 165 LBS | TEMPERATURE: 97.6 F

## 2025-01-22 DIAGNOSIS — Z12.5 SPECIAL SCREENING FOR MALIGNANT NEOPLASM OF PROSTATE: ICD-10-CM

## 2025-01-22 DIAGNOSIS — E11.65 TYPE 2 DIABETES MELLITUS WITH HYPERGLYCEMIA, UNSPECIFIED WHETHER LONG TERM INSULIN USE (HCC): ICD-10-CM

## 2025-01-22 DIAGNOSIS — Z12.11 COLON CANCER SCREENING: ICD-10-CM

## 2025-01-22 DIAGNOSIS — R21 RASH: ICD-10-CM

## 2025-01-22 DIAGNOSIS — K22.70 BARRETT'S ESOPHAGUS WITHOUT DYSPLASIA: Primary | ICD-10-CM

## 2025-01-22 LAB
ALBUMIN SERPL-MCNC: 4.8 G/DL (ref 3.5–4.6)
ALP SERPL-CCNC: 136 U/L (ref 35–104)
ALT SERPL-CCNC: 20 U/L (ref 0–41)
ANION GAP SERPL CALCULATED.3IONS-SCNC: 10 MEQ/L (ref 9–15)
AST SERPL-CCNC: 29 U/L (ref 0–40)
BILIRUB SERPL-MCNC: 0.6 MG/DL (ref 0.2–0.7)
BILIRUB UR QL STRIP: NEGATIVE
BUN SERPL-MCNC: 19 MG/DL (ref 6–20)
CALCIUM SERPL-MCNC: 10 MG/DL (ref 8.5–9.9)
CHLORIDE SERPL-SCNC: 103 MEQ/L (ref 95–107)
CLARITY UR: CLEAR
CO2 SERPL-SCNC: 29 MEQ/L (ref 20–31)
COLOR UR: YELLOW
CREAT SERPL-MCNC: 1.31 MG/DL (ref 0.7–1.2)
GLOBULIN SER CALC-MCNC: 2.9 G/DL (ref 2.3–3.5)
GLUCOSE SERPL-MCNC: 155 MG/DL (ref 70–99)
GLUCOSE UR STRIP-MCNC: >=1000 MG/DL
HGB UR QL STRIP: NEGATIVE
KETONES UR STRIP-MCNC: ABNORMAL MG/DL
LEUKOCYTE ESTERASE UR QL STRIP: NEGATIVE
NITRITE UR QL STRIP: NEGATIVE
PH UR STRIP: 5.5 [PH] (ref 5–9)
POTASSIUM SERPL-SCNC: 5.4 MEQ/L (ref 3.4–4.9)
PROT SERPL-MCNC: 7.7 G/DL (ref 6.3–8)
PROT UR STRIP-MCNC: NEGATIVE MG/DL
PSA SERPL-MCNC: 0.95 NG/ML (ref 0–4)
SODIUM SERPL-SCNC: 142 MEQ/L (ref 135–144)
SP GR UR STRIP: 1.04 (ref 1–1.03)
UROBILINOGEN UR STRIP-ACNC: 0.2 E.U./DL

## 2025-01-22 PROCEDURE — 1036F TOBACCO NON-USER: CPT | Performed by: FAMILY MEDICINE

## 2025-01-22 PROCEDURE — 99213 OFFICE O/P EST LOW 20 MIN: CPT | Performed by: FAMILY MEDICINE

## 2025-01-22 PROCEDURE — G8427 DOCREV CUR MEDS BY ELIG CLIN: HCPCS | Performed by: FAMILY MEDICINE

## 2025-01-22 PROCEDURE — 3017F COLORECTAL CA SCREEN DOC REV: CPT | Performed by: FAMILY MEDICINE

## 2025-01-22 PROCEDURE — 99214 OFFICE O/P EST MOD 30 MIN: CPT | Performed by: FAMILY MEDICINE

## 2025-01-22 PROCEDURE — G8419 CALC BMI OUT NRM PARAM NOF/U: HCPCS | Performed by: FAMILY MEDICINE

## 2025-01-22 RX ORDER — PANTOPRAZOLE SODIUM 40 MG/1
40 TABLET, DELAYED RELEASE ORAL
Qty: 90 TABLET | Refills: 0 | Status: SHIPPED | OUTPATIENT
Start: 2025-01-22

## 2025-01-22 ASSESSMENT — ENCOUNTER SYMPTOMS
TROUBLE SWALLOWING: 0
COUGH: 0
VOMITING: 0

## 2025-01-22 NOTE — PROGRESS NOTES
Subjective:      Patient ID: Adi Navarro is a 59 y.o. male    Gastroesophageal Reflux  He reports no coughing. The current episode started more than 1 year ago. He has tried a PPI for the symptoms.     Here in follow up for gerd and barretts esophagus and prostate check.  .  Doing well with protonix for control of gerd symptoms.   Weight down about 9 pounds since last time.       No changes with urine flow or hematuria.  No dysuria.  Chronic intermittent rash over the last 6 months-would like to see dermatology for this    Review of Systems   Constitutional:  Negative for chills and fever.   HENT:  Negative for trouble swallowing.    Respiratory:  Negative for cough.    Gastrointestinal:  Negative for vomiting.   Neurological:  Negative for weakness.     Reviewed allergy, medical, social, surgical, family and med list changes and updated   Files     Social History     Socioeconomic History    Marital status:    Tobacco Use    Smoking status: Never    Smokeless tobacco: Never   Substance and Sexual Activity    Alcohol use: Yes     Comment: occasional    Drug use: No     Social Determinants of Health     Financial Resource Strain: Low Risk  (3/22/2024)    Overall Financial Resource Strain (CARDIA)     Difficulty of Paying Living Expenses: Not hard at all   Food Insecurity: Food Insecurity Present (1/14/2025)    Hunger Vital Sign     Worried About Running Out of Food in the Last Year: Sometimes true     Ran Out of Food in the Last Year: Sometimes true   Transportation Needs: Unmet Transportation Needs (1/14/2025)    PRAPARE - Transportation     Lack of Transportation (Medical): Yes     Lack of Transportation (Non-Medical): No   Housing Stability: High Risk (1/14/2025)    Housing Stability Vital Sign     Unable to Pay for Housing in the Last Year: Yes     Number of Times Moved in the Last Year: 0     Homeless in the Last Year: No     Current Outpatient Medications   Medication Sig Dispense Refill

## 2025-01-23 LAB
ESTIMATED AVERAGE GLUCOSE: 134 MG/DL
HBA1C MFR BLD: 6.3 % (ref 4–6)

## 2025-01-28 ENCOUNTER — OFFICE VISIT (OUTPATIENT)
Dept: ENDOCRINOLOGY | Age: 60
End: 2025-01-28
Payer: COMMERCIAL

## 2025-01-28 VITALS
BODY MASS INDEX: 25.27 KG/M2 | HEART RATE: 88 BPM | HEIGHT: 67 IN | OXYGEN SATURATION: 95 % | WEIGHT: 161 LBS | SYSTOLIC BLOOD PRESSURE: 122 MMHG | DIASTOLIC BLOOD PRESSURE: 82 MMHG

## 2025-01-28 DIAGNOSIS — E11.65 TYPE 2 DIABETES MELLITUS WITH HYPERGLYCEMIA, UNSPECIFIED WHETHER LONG TERM INSULIN USE (HCC): Primary | ICD-10-CM

## 2025-01-28 LAB
CHP ED QC CHECK: NORMAL
GLUCOSE BLD-MCNC: 114 MG/DL

## 2025-01-28 PROCEDURE — 3017F COLORECTAL CA SCREEN DOC REV: CPT | Performed by: PHYSICIAN ASSISTANT

## 2025-01-28 PROCEDURE — 3044F HG A1C LEVEL LT 7.0%: CPT | Performed by: PHYSICIAN ASSISTANT

## 2025-01-28 PROCEDURE — 1036F TOBACCO NON-USER: CPT | Performed by: PHYSICIAN ASSISTANT

## 2025-01-28 PROCEDURE — 82962 GLUCOSE BLOOD TEST: CPT | Performed by: PHYSICIAN ASSISTANT

## 2025-01-28 PROCEDURE — 99214 OFFICE O/P EST MOD 30 MIN: CPT | Performed by: PHYSICIAN ASSISTANT

## 2025-01-28 PROCEDURE — G8419 CALC BMI OUT NRM PARAM NOF/U: HCPCS | Performed by: PHYSICIAN ASSISTANT

## 2025-01-28 PROCEDURE — 2022F DILAT RTA XM EVC RTNOPTHY: CPT | Performed by: PHYSICIAN ASSISTANT

## 2025-01-28 PROCEDURE — G8427 DOCREV CUR MEDS BY ELIG CLIN: HCPCS | Performed by: PHYSICIAN ASSISTANT

## 2025-01-28 PROCEDURE — 95251 CONT GLUC MNTR ANALYSIS I&R: CPT | Performed by: PHYSICIAN ASSISTANT

## 2025-01-28 RX ORDER — ATORVASTATIN CALCIUM 20 MG/1
20 TABLET, FILM COATED ORAL DAILY
Qty: 90 TABLET | Refills: 3 | Status: SHIPPED | OUTPATIENT
Start: 2025-01-28

## 2025-01-28 RX ORDER — OMEGA-3-ACID ETHYL ESTERS 1 G/1
2 CAPSULE, LIQUID FILLED ORAL 2 TIMES DAILY
Qty: 60 CAPSULE | Refills: 3 | Status: SHIPPED | OUTPATIENT
Start: 2025-01-28

## 2025-01-28 ASSESSMENT — ENCOUNTER SYMPTOMS
COUGH: 0
NAUSEA: 0
VOMITING: 0
DIARRHEA: 0
WHEEZING: 0
ABDOMINAL PAIN: 0
EYE REDNESS: 0
SORE THROAT: 0
SHORTNESS OF BREATH: 0
EYE PAIN: 0
RHINORRHEA: 0
SINUS PRESSURE: 0

## 2025-01-28 NOTE — PATIENT INSTRUCTIONS
Continue Jardiance 10 mg daily   Continue Trulicity 4.5 mg weekly  Continue Metformin 500 mg twice a day   Dexcom G7 being used  Sildenafil 100 mg p.o. daily as needed  Restart Atorvastatin 20 mg nightly   Restart Lovaza 4 capsules daily   Repeat labs and follow up in 4 months

## 2025-01-28 NOTE — PROGRESS NOTES
Hematological:  Negative for adenopathy.   Psychiatric/Behavioral:  Negative for agitation.        Objective:   Physical Exam  Constitutional:       Appearance: He is well-developed.   HENT:      Head: Normocephalic and atraumatic.      Nose: No rhinorrhea.      Mouth/Throat:      Mouth: Mucous membranes are moist.   Eyes:      Conjunctiva/sclera: Conjunctivae normal.   Neck:      Thyroid: No thyromegaly.      Comments: No thyromegaly or palpable nodules   Cardiovascular:      Rate and Rhythm: Normal rate and regular rhythm.      Heart sounds: Normal heart sounds. No murmur heard.  Pulmonary:      Effort: Pulmonary effort is normal.      Breath sounds: Normal breath sounds. No wheezing.   Abdominal:      General: Bowel sounds are normal. There is no distension.      Palpations: Abdomen is soft.      Tenderness: There is no abdominal tenderness.   Musculoskeletal:         General: Normal range of motion.      Cervical back: Normal range of motion and neck supple. No tenderness.   Skin:     General: Skin is warm and dry.      Findings: Lesion (dark brown lesions on arms, abdomen, and back) present.   Neurological:      Mental Status: He is alert and oriented to person, place, and time.   Psychiatric:         Mood and Affect: Mood normal.         Behavior: Behavior normal.

## 2025-02-17 RX ORDER — DULAGLUTIDE 4.5 MG/.5ML
INJECTION, SOLUTION SUBCUTANEOUS
Qty: 2 ML | Refills: 5 | Status: SHIPPED | OUTPATIENT
Start: 2025-02-17

## 2025-02-17 RX ORDER — ACYCLOVIR 400 MG/1
TABLET ORAL
Qty: 1 EACH | Refills: 0 | Status: SHIPPED | OUTPATIENT
Start: 2025-02-17

## 2025-02-19 RX ORDER — ACYCLOVIR 400 MG/1
TABLET ORAL
Qty: 3 EACH | Refills: 5 | Status: SHIPPED | OUTPATIENT
Start: 2025-02-19

## 2025-02-25 ENCOUNTER — PREP FOR PROCEDURE (OUTPATIENT)
Dept: GASTROENTEROLOGY | Age: 60
End: 2025-02-25

## 2025-02-25 ENCOUNTER — OFFICE VISIT (OUTPATIENT)
Dept: GASTROENTEROLOGY | Age: 60
End: 2025-02-25
Payer: COMMERCIAL

## 2025-02-25 VITALS — HEART RATE: 98 BPM | BODY MASS INDEX: 25.27 KG/M2 | HEIGHT: 67 IN | OXYGEN SATURATION: 98 % | WEIGHT: 161 LBS

## 2025-02-25 DIAGNOSIS — K22.70 BARRETT'S ESOPHAGUS WITHOUT DYSPLASIA: Primary | ICD-10-CM

## 2025-02-25 DIAGNOSIS — K21.9 GASTROESOPHAGEAL REFLUX DISEASE, UNSPECIFIED WHETHER ESOPHAGITIS PRESENT: ICD-10-CM

## 2025-02-25 DIAGNOSIS — K22.70 BARRETT'S ESOPHAGUS WITHOUT DYSPLASIA: ICD-10-CM

## 2025-02-25 PROCEDURE — 99203 OFFICE O/P NEW LOW 30 MIN: CPT | Performed by: INTERNAL MEDICINE

## 2025-02-25 PROCEDURE — G8427 DOCREV CUR MEDS BY ELIG CLIN: HCPCS | Performed by: INTERNAL MEDICINE

## 2025-02-25 PROCEDURE — 1036F TOBACCO NON-USER: CPT | Performed by: INTERNAL MEDICINE

## 2025-02-25 PROCEDURE — 3017F COLORECTAL CA SCREEN DOC REV: CPT | Performed by: INTERNAL MEDICINE

## 2025-02-25 PROCEDURE — G8419 CALC BMI OUT NRM PARAM NOF/U: HCPCS | Performed by: INTERNAL MEDICINE

## 2025-02-25 RX ORDER — SODIUM CHLORIDE 9 MG/ML
INJECTION, SOLUTION INTRAVENOUS PRN
Status: CANCELLED | OUTPATIENT
Start: 2025-02-25

## 2025-02-25 RX ORDER — SODIUM CHLORIDE 0.9 % (FLUSH) 0.9 %
5-40 SYRINGE (ML) INJECTION PRN
Status: CANCELLED | OUTPATIENT
Start: 2025-02-25

## 2025-02-25 RX ORDER — SODIUM CHLORIDE 0.9 % (FLUSH) 0.9 %
5-40 SYRINGE (ML) INJECTION EVERY 12 HOURS SCHEDULED
Status: CANCELLED | OUTPATIENT
Start: 2025-02-25

## 2025-02-25 RX ORDER — SODIUM CHLORIDE 9 MG/ML
INJECTION, SOLUTION INTRAVENOUS CONTINUOUS
Status: CANCELLED | OUTPATIENT
Start: 2025-02-25

## 2025-02-25 NOTE — PROGRESS NOTES
Gastroenterology Clinic Visit    Adi Navarro  [id number]  Chief Complaint   Patient presents with    New Patient       History of Present Illness  The patient is a 59-year-old male who presents for Pitts's esophagus. He is accompanied by his wife.    He reports no family history of colon cancer and is not experiencing any symptoms such as blood in the stool, constipation, diarrhea, abdominal pain, or weight loss. His bowel movements are regular. He does not smoke. He is here for screening for the next colonoscopy test. Dr. White wants him to have another upper and lower endoscopy. He has not had it done since 2018, so he is overdue. He had a colonoscopy and upper endoscopy on 03/19/2018.    He has been diagnosed with long segment Pitts's esophagus, measuring 6 cm. He experiences acid reflux symptoms when not on medication, which previously caused him to wake up in the middle of the night feeling like he wanted to vomit due to acid reflux getting stuck in his throat. He reports no upper GI symptoms, difficulty swallowing, hematemesis, nausea, vomiting, or weight loss. He is currently on daily acid suppressive medication, which he finds effective. He used to consume a whole bottle of Tums in a week, but now he does not need them with the pills.    He has diabetes, which was bad but is controlled now. He was diagnosed in approximately 2016 or 2017. He reports no issues with gastroparesis. He is on Trulicity once a week, Jardiance, and metformin.    He has high blood pressure and cholesterol, which are controlled.    Supplemental Information  He had a cystoscopy, some stents, and a hernia repair in 1998 when he was 33.    SOCIAL HISTORY  He does not smoke.    FAMILY HISTORY  He reports no family history of colon cancer.    MEDICATIONS  Current: Trulicity, Jardiance, metformin, Protonix, Tums (as needed).      Previous GI work up/Endoscopic investigations:   EGD and colonoscopy: 3/19/2018: Long segment

## 2025-03-18 RX ORDER — ACYCLOVIR 400 MG/1
TABLET ORAL
Qty: 3 EACH | Refills: 5 | Status: SHIPPED | OUTPATIENT
Start: 2025-03-18

## 2025-03-18 RX ORDER — DULAGLUTIDE 4.5 MG/.5ML
INJECTION, SOLUTION SUBCUTANEOUS
Qty: 2 ML | Refills: 5 | Status: SHIPPED | OUTPATIENT
Start: 2025-03-18

## 2025-03-26 ENCOUNTER — ANESTHESIA EVENT (OUTPATIENT)
Dept: ENDOSCOPY | Age: 60
End: 2025-03-26
Payer: COMMERCIAL

## 2025-03-26 NOTE — ANESTHESIA PRE PROCEDURE
Department of Anesthesiology  Preprocedure Note       Name:  Adi Navarro   Age:  59 y.o.  :  1965                                          MRN:  [id number]         Date:  3/26/2025      Surgeon: Surgeon(s):  Evan Jones MD    Procedure: Procedure(s):  ESOPHAGOGASTRODUODENOSCOPY    Medications prior to admission:   Prior to Admission medications    Medication Sig Start Date End Date Taking? Authorizing Provider   Dulaglutide (TRULICITY) 4.5 MG/0.5ML SOAJ Inject 4.5 mg weekly 3/18/25   Mendel Cr PA   Continuous Glucose Sensor (DEXCOM G7 SENSOR) MISC CHANGE SENSOR EVERY 10 DAYS 3/18/25   Mendel Cr PA   Continuous Glucose  (DEXCOM G7 ) KYM Give 1  25   Mendel Cr PA   atorvastatin (LIPITOR) 20 MG tablet Take 1 tablet by mouth daily 25   Mendel Cr PA   omega-3 acid ethyl esters (LOVAZA) 1 g capsule Take 2 capsules by mouth 2 times daily 25   Mendel Cr PA   pantoprazole (PROTONIX) 40 MG tablet Take 1 tablet by mouth every morning (before breakfast) 25   Kd Zheng MD   metFORMIN (GLUCOPHAGE-XR) 500 MG extended release tablet Take 1 tablet by mouth 2 times daily 24   Mendel Cr PA   Insulin Syringe-Needle U-100 31G X 5/16\" 1 ML MISC 1 each by Does not apply route 3 times daily May substitute for generic or insurance covered product 24   Mendel Cr PA   Insulin Pen Needle 32G X 6 MM MISC 1 Device by Does not apply route 3 times daily 24   Mendel Cr PA   empagliflozin (JARDIANCE) 10 MG tablet take 1 tablet by mouth once daily 24   Mendel Cr PA   HUMULIN R 100 UNIT/ML injection  23   ProviderDilma MD   blood glucose monitor strips 1 strip by Other route 4 times daily (before meals and nightly) Pt test 4x daily Dx E11.65.  May substitute for generic or insurance covered product 23   Mendel Cr PA       Current medications:    No current

## 2025-03-27 ENCOUNTER — ANESTHESIA (OUTPATIENT)
Dept: ENDOSCOPY | Age: 60
End: 2025-03-27
Payer: COMMERCIAL

## 2025-03-27 ENCOUNTER — HOSPITAL ENCOUNTER (OUTPATIENT)
Age: 60
Setting detail: OUTPATIENT SURGERY
Discharge: HOME | End: 2025-03-27
Attending: INTERNAL MEDICINE | Admitting: INTERNAL MEDICINE
Payer: COMMERCIAL

## 2025-03-27 VITALS
DIASTOLIC BLOOD PRESSURE: 56 MMHG | RESPIRATION RATE: 20 BRPM | SYSTOLIC BLOOD PRESSURE: 101 MMHG | WEIGHT: 160 LBS | BODY MASS INDEX: 25.11 KG/M2 | OXYGEN SATURATION: 97 % | HEIGHT: 67 IN | HEART RATE: 88 BPM | TEMPERATURE: 96.8 F

## 2025-03-27 DIAGNOSIS — K22.70 BARRETT'S ESOPHAGUS WITHOUT DYSPLASIA: ICD-10-CM

## 2025-03-27 DIAGNOSIS — K21.9 GASTROESOPHAGEAL REFLUX DISEASE, UNSPECIFIED WHETHER ESOPHAGITIS PRESENT: ICD-10-CM

## 2025-03-27 PROCEDURE — 7100000010 HC PHASE II RECOVERY - FIRST 15 MIN: Performed by: INTERNAL MEDICINE

## 2025-03-27 PROCEDURE — 88305 TISSUE EXAM BY PATHOLOGIST: CPT

## 2025-03-27 PROCEDURE — 2709999900 HC NON-CHARGEABLE SUPPLY: Performed by: INTERNAL MEDICINE

## 2025-03-27 PROCEDURE — 6360000002 HC RX W HCPCS: Performed by: NURSE ANESTHETIST, CERTIFIED REGISTERED

## 2025-03-27 PROCEDURE — 3609017100 HC EGD: Performed by: INTERNAL MEDICINE

## 2025-03-27 PROCEDURE — 3700000000 HC ANESTHESIA ATTENDED CARE: Performed by: INTERNAL MEDICINE

## 2025-03-27 PROCEDURE — 7100000011 HC PHASE II RECOVERY - ADDTL 15 MIN: Performed by: INTERNAL MEDICINE

## 2025-03-27 PROCEDURE — 2500000003 HC RX 250 WO HCPCS: Performed by: INTERNAL MEDICINE

## 2025-03-27 PROCEDURE — 3700000001 HC ADD 15 MINUTES (ANESTHESIA): Performed by: INTERNAL MEDICINE

## 2025-03-27 PROCEDURE — 43239 EGD BIOPSY SINGLE/MULTIPLE: CPT | Performed by: INTERNAL MEDICINE

## 2025-03-27 PROCEDURE — 2580000003 HC RX 258: Performed by: INTERNAL MEDICINE

## 2025-03-27 RX ORDER — LIDOCAINE HYDROCHLORIDE 20 MG/ML
INJECTION, SOLUTION EPIDURAL; INFILTRATION; INTRACAUDAL; PERINEURAL
Status: DISCONTINUED | OUTPATIENT
Start: 2025-03-27 | End: 2025-03-27 | Stop reason: SDUPTHER

## 2025-03-27 RX ORDER — SODIUM CHLORIDE 0.9 % (FLUSH) 0.9 %
5-40 SYRINGE (ML) INJECTION PRN
Status: DISCONTINUED | OUTPATIENT
Start: 2025-03-27 | End: 2025-03-27 | Stop reason: HOSPADM

## 2025-03-27 RX ORDER — SODIUM CHLORIDE 9 MG/ML
INJECTION, SOLUTION INTRAVENOUS CONTINUOUS
Status: DISCONTINUED | OUTPATIENT
Start: 2025-03-27 | End: 2025-03-27 | Stop reason: HOSPADM

## 2025-03-27 RX ORDER — PROPOFOL 10 MG/ML
INJECTION, EMULSION INTRAVENOUS
Status: DISCONTINUED | OUTPATIENT
Start: 2025-03-27 | End: 2025-03-27 | Stop reason: SDUPTHER

## 2025-03-27 RX ORDER — SODIUM CHLORIDE 9 MG/ML
INJECTION, SOLUTION INTRAVENOUS PRN
Status: DISCONTINUED | OUTPATIENT
Start: 2025-03-27 | End: 2025-03-27 | Stop reason: HOSPADM

## 2025-03-27 RX ORDER — SODIUM CHLORIDE 0.9 % (FLUSH) 0.9 %
5-40 SYRINGE (ML) INJECTION EVERY 12 HOURS SCHEDULED
Status: DISCONTINUED | OUTPATIENT
Start: 2025-03-27 | End: 2025-03-27 | Stop reason: HOSPADM

## 2025-03-27 RX ADMIN — PROPOFOL 100 MG: 10 INJECTION, EMULSION INTRAVENOUS at 11:47

## 2025-03-27 RX ADMIN — PROPOFOL 100 MG: 10 INJECTION, EMULSION INTRAVENOUS at 11:45

## 2025-03-27 RX ADMIN — PROPOFOL 50 MG: 10 INJECTION, EMULSION INTRAVENOUS at 11:49

## 2025-03-27 RX ADMIN — LIDOCAINE HYDROCHLORIDE 50 MG: 20 INJECTION, SOLUTION EPIDURAL; INFILTRATION; INTRACAUDAL; PERINEURAL at 11:45

## 2025-03-27 RX ADMIN — PROPOFOL 20 MG: 10 INJECTION, EMULSION INTRAVENOUS at 11:52

## 2025-03-27 RX ADMIN — PROPOFOL 20 MG: 10 INJECTION, EMULSION INTRAVENOUS at 11:57

## 2025-03-27 RX ADMIN — SODIUM CHLORIDE: 0.9 INJECTION, SOLUTION INTRAVENOUS at 11:41

## 2025-03-27 RX ADMIN — PROPOFOL 40 MG: 10 INJECTION, EMULSION INTRAVENOUS at 11:50

## 2025-03-27 RX ADMIN — PROPOFOL 20 MG: 10 INJECTION, EMULSION INTRAVENOUS at 11:54

## 2025-03-27 ASSESSMENT — ENCOUNTER SYMPTOMS: SHORTNESS OF BREATH: 1

## 2025-03-27 ASSESSMENT — PAIN - FUNCTIONAL ASSESSMENT: PAIN_FUNCTIONAL_ASSESSMENT: 0-10

## 2025-03-27 NOTE — H&P
Patient Name: Adi Navarro  : 1965  MRN: [id number]  DATE: 25      ENDOSCOPY  History and Physical    Procedure:    [] Diagnostic Colonoscopy       [] Screening Colonoscopy  [] EGD      [] ERCP      [] EUS       [] Other    [x] Previous office notes/History and Physical reviewed from the patients chart. Please see EMR for further details of HPI. I have examined the patient's status immediately prior to the procedure and:      Indications/HPI:    []Abdominal Pain   []Cancer- GI/Lung  []Fhx of colon CA  []History of Polyps   []Pitts’s   []Melena  []Abnormal Imaging   []Dysphagia    []Persistent Pneumonia  []Anemia   []Food Impaction  []History of Polyps  []GI Bleed   []Pulmonary nodule/Mass  []Change in bowel habits  []Heartburn/Reflux  []Rectal Bleed (BRBPR)  []Chest Pain - Non Cardiac  []Heme (+) Stool  []Ulcers  []Constipation   []Hemoptysis   []Varices  []Diarrhea   []Hypoxemia  []Nausea/Vomiting   []Screening   []Crohns/Colitis  []Other:    Anesthesia:   [x] MAC [] Moderate Sedation   [] General   [] None     ROS: 12 pt Review of Symptoms was negative unless mentioned above    Medications:   Prior to Admission medications    Medication Sig Start Date End Date Taking? Authorizing Provider   Dulaglutide (TRULICITY) 4.5 MG/0.5ML SOAJ Inject 4.5 mg weekly 3/18/25  Yes Mendel Cr PA   atorvastatin (LIPITOR) 20 MG tablet Take 1 tablet by mouth daily 25  Yes Mendel Cr PA   omega-3 acid ethyl esters (LOVAZA) 1 g capsule Take 2 capsules by mouth 2 times daily 25  Yes Mendel Cr PA   pantoprazole (PROTONIX) 40 MG tablet Take 1 tablet by mouth every morning (before breakfast) 25  Yes Kd Zheng MD   metFORMIN (GLUCOPHAGE-XR) 500 MG extended release tablet Take 1 tablet by mouth 2 times daily 24  Yes Mendel Cr PA   empagliflozin (JARDIANCE) 10 MG tablet take 1 tablet by mouth once daily 24  Yes Mendel Cr PA   HUMULIN R 100 UNIT/ML  injection  11/13/23  Yes Provider, MD Dilma   Continuous Glucose Sensor (DEXCOM G7 SENSOR) MISC CHANGE SENSOR EVERY 10 DAYS 3/18/25   Mendel Cr PA   Continuous Glucose  (DEXCOM G7 ) KYM Give 1  2/17/25   Mendel Cr PA   Insulin Syringe-Needle U-100 31G X 5/16\" 1 ML MISC 1 each by Does not apply route 3 times daily May substitute for generic or insurance covered product 5/21/24   Mendel Cr PA   Insulin Pen Needle 32G X 6 MM MISC 1 Device by Does not apply route 3 times daily 1/16/24   Mendel Cr PA   blood glucose monitor strips 1 strip by Other route 4 times daily (before meals and nightly) Pt test 4x daily Dx E11.65.  May substitute for generic or insurance covered product 4/4/23   Mendel Cr PA     Allergies: No Known Allergies   History of allergic reaction to anesthesia:  No  Past Medical History:  Past Medical History:   Diagnosis Date    Acid reflux disease with ulcer 03/19/2018    Bilateral carpal tunnel syndrome 9/24/2020    Diabetes mellitus (HCC)     BUCKLEY (dyspnea on exertion) 2/2/2018    Hypertension     Mixed hyperlipidemia 2/2/2018     Past Surgical History:  Past Surgical History:   Procedure Laterality Date    CYSTOSCOPY Right 12/13/2017    CYSTOSCOPY RIGHT  RETROGRADE PYELOGRAM AND REMOVAL OF RIGHT DJ STENT (LABS DONE 11/27) performed by Julian Sarmiento MD at [facility] OR    CYSTOSCOPY INSERTION / REMOVAL STENT / STONE Right 11/8/2017    CYSTOSCOPY RIGHT DOUBLE J  STENT INSERTION performed by Julian Sarmiento MD at [facility] OR    HERNIA REPAIR  1998    RI COLONOSCOPY FLX DX W/COLLJ SPEC WHEN PFRMD N/A 3/19/2018    COLONOSCOPY performed by Tashi Larsen MD at [facility]    RI ESOPHAGOGASTRODUODENOSCOPY TRANSORAL DIAGNOSTIC N/A 3/19/2018    EGD ESOPHAGOGASTRODUODENOSCOPY performed by Tashi Larsen MD at [facility]     Social History:  Social History     Tobacco Use    Smoking status: Never    Smokeless tobacco: Never

## 2025-03-27 NOTE — ANESTHESIA POSTPROCEDURE EVALUATION
Department of Anesthesiology  Postprocedure Note    Patient: Adi Navarro  MRN: [id number]  YOB: 1965  Date of evaluation: 3/27/2025    Procedure Summary       Date: 03/27/25 Room / Location: [facility] OR 02 / [facility]    Anesthesia Start: 1141 Anesthesia Stop: 1206    Procedure: ESOPHAGOGASTRODUODENOSCOPY WITH BIOPSIES Diagnosis:       Pitts's esophagus without dysplasia      Gastroesophageal reflux disease, unspecified whether esophagitis present      (Pitts's esophagus without dysplasia [K22.70])      (Gastroesophageal reflux disease, unspecified whether esophagitis present [K21.9])    Surgeons: Evan Jones MD Responsible Provider: Tiffany Field APRN - CRNA    Anesthesia Type: MAC ASA Status: 3            Anesthesia Type: No value filed.    Chris Phase I: Chris Score: 10    Chris Phase II:      Anesthesia Post Evaluation    Patient location during evaluation: bedside  Patient participation: waiting for patient participation  Level of consciousness: responsive to light touch  Pain score: 0  Airway patency: patent  Nausea & Vomiting: no nausea and no vomiting  Cardiovascular status: blood pressure returned to baseline and hemodynamically stable  Respiratory status: acceptable  Hydration status: euvolemic  Pain management: adequate        No notable events documented.

## 2025-03-31 ENCOUNTER — RESULTS FOLLOW-UP (OUTPATIENT)
Dept: GASTROENTEROLOGY | Age: 60
End: 2025-03-31

## 2025-04-02 RX ORDER — OMEGA-3-ACID ETHYL ESTERS 1 G/1
2 CAPSULE, LIQUID FILLED ORAL 2 TIMES DAILY
Qty: 60 CAPSULE | Refills: 3 | Status: SHIPPED | OUTPATIENT
Start: 2025-04-02

## 2025-04-21 RX ORDER — DULAGLUTIDE 4.5 MG/.5ML
INJECTION, SOLUTION SUBCUTANEOUS
Qty: 2 ML | Refills: 5 | Status: SHIPPED | OUTPATIENT
Start: 2025-04-21

## 2025-04-21 RX ORDER — ATORVASTATIN CALCIUM 20 MG/1
20 TABLET, FILM COATED ORAL DAILY
Qty: 90 TABLET | Refills: 3 | Status: SHIPPED | OUTPATIENT
Start: 2025-04-21

## 2025-04-21 RX ORDER — ACYCLOVIR 400 MG/1
TABLET ORAL
Qty: 3 EACH | Refills: 5 | Status: SHIPPED | OUTPATIENT
Start: 2025-04-21

## 2025-04-22 RX ORDER — ACYCLOVIR 400 MG/1
TABLET ORAL
Qty: 1 EACH | Refills: 0 | Status: SHIPPED | OUTPATIENT
Start: 2025-04-22

## 2025-04-25 RX ORDER — ACYCLOVIR 400 MG/1
TABLET ORAL
Qty: 1 EACH | Refills: 0 | OUTPATIENT
Start: 2025-04-25

## 2025-04-25 RX ORDER — ATORVASTATIN CALCIUM 20 MG/1
20 TABLET, FILM COATED ORAL DAILY
Qty: 90 TABLET | Refills: 3 | OUTPATIENT
Start: 2025-04-25

## 2025-04-29 ENCOUNTER — OFFICE VISIT (OUTPATIENT)
Age: 60
End: 2025-04-29
Payer: COMMERCIAL

## 2025-04-29 VITALS
TEMPERATURE: 98.3 F | SYSTOLIC BLOOD PRESSURE: 124 MMHG | WEIGHT: 158 LBS | DIASTOLIC BLOOD PRESSURE: 70 MMHG | HEART RATE: 77 BPM | BODY MASS INDEX: 24.8 KG/M2 | HEIGHT: 67 IN | OXYGEN SATURATION: 97 %

## 2025-04-29 DIAGNOSIS — Z00.00 HEALTH MAINTENANCE EXAMINATION: Primary | ICD-10-CM

## 2025-04-29 DIAGNOSIS — Z79.4 TYPE 2 DIABETES MELLITUS WITHOUT COMPLICATION, WITH LONG-TERM CURRENT USE OF INSULIN (HCC): ICD-10-CM

## 2025-04-29 DIAGNOSIS — K22.70 BARRETT'S ESOPHAGUS WITHOUT DYSPLASIA: ICD-10-CM

## 2025-04-29 DIAGNOSIS — E78.2 MIXED HYPERLIPIDEMIA: ICD-10-CM

## 2025-04-29 DIAGNOSIS — E11.9 TYPE 2 DIABETES MELLITUS WITHOUT COMPLICATION, WITH LONG-TERM CURRENT USE OF INSULIN (HCC): ICD-10-CM

## 2025-04-29 DIAGNOSIS — Z12.5 SPECIAL SCREENING FOR MALIGNANT NEOPLASM OF PROSTATE: ICD-10-CM

## 2025-04-29 PROCEDURE — 99396 PREV VISIT EST AGE 40-64: CPT | Performed by: FAMILY MEDICINE

## 2025-04-29 ASSESSMENT — ENCOUNTER SYMPTOMS
DIARRHEA: 0
COUGH: 0
VOMITING: 0

## 2025-04-29 NOTE — PROGRESS NOTES
Subjective:      Patient ID: Adi Navarro is a 59 y.o. male    HPI  Here for physical.   Does see endocrine for lipids and diabetes.  Weight down few pounds since last time--occasional left upper leg pain-intermittent and relieved with motrin      Review of Systems   Constitutional:  Negative for chills and fever.   Respiratory:  Negative for cough.    Gastrointestinal:  Negative for diarrhea and vomiting.   Neurological:  Negative for weakness.     Reviewed allergy, medical, social, surgical, family and med list changes and updated   Files     Social History     Socioeconomic History    Marital status:      Spouse name: None    Number of children: None    Years of education: None    Highest education level: None   Tobacco Use    Smoking status: Never    Smokeless tobacco: Never   Vaping Use    Vaping status: Never Used   Substance and Sexual Activity    Alcohol use: Yes     Alcohol/week: 2.0 standard drinks of alcohol     Types: 2 Cans of beer per week     Comment: rare    Drug use: No    Sexual activity: Yes     Partners: Female     Social Drivers of Health     Financial Resource Strain: Low Risk  (3/22/2024)    Overall Financial Resource Strain (CARDIA)     Difficulty of Paying Living Expenses: Not hard at all   Food Insecurity: Food Insecurity Present (1/14/2025)    Hunger Vital Sign     Worried About Running Out of Food in the Last Year: Sometimes true     Ran Out of Food in the Last Year: Sometimes true   Transportation Needs: Unmet Transportation Needs (1/14/2025)    PRAPARE - Transportation     Lack of Transportation (Medical): Yes     Lack of Transportation (Non-Medical): No   Housing Stability: High Risk (1/14/2025)    Housing Stability Vital Sign     Unable to Pay for Housing in the Last Year: Yes     Number of Times Moved in the Last Year: 0     Homeless in the Last Year: No     Current Outpatient Medications   Medication Sig Dispense Refill    empagliflozin (JARDIANCE) 10 MG tablet take 1

## 2025-05-05 RX ORDER — ACYCLOVIR 400 MG/1
TABLET ORAL
Qty: 1 EACH | Refills: 0 | Status: SHIPPED | OUTPATIENT
Start: 2025-05-05

## 2025-05-07 DIAGNOSIS — E11.65 TYPE 2 DIABETES MELLITUS WITH HYPERGLYCEMIA, UNSPECIFIED WHETHER LONG TERM INSULIN USE (HCC): ICD-10-CM

## 2025-05-07 LAB
ALBUMIN SERPL-MCNC: 4.4 G/DL (ref 3.5–4.6)
ALP SERPL-CCNC: 124 U/L (ref 35–104)
ALT SERPL-CCNC: 19 U/L (ref 0–41)
ANION GAP SERPL CALCULATED.3IONS-SCNC: 9 MEQ/L (ref 9–15)
AST SERPL-CCNC: 24 U/L (ref 0–40)
BILIRUB SERPL-MCNC: 0.7 MG/DL (ref 0.2–0.7)
BUN SERPL-MCNC: 18 MG/DL (ref 6–20)
CALCIUM SERPL-MCNC: 9.3 MG/DL (ref 8.5–9.9)
CHLORIDE SERPL-SCNC: 102 MEQ/L (ref 95–107)
CO2 SERPL-SCNC: 24 MEQ/L (ref 20–31)
CREAT SERPL-MCNC: 1.14 MG/DL (ref 0.7–1.2)
ERYTHROCYTE [DISTWIDTH] IN BLOOD BY AUTOMATED COUNT: 12.5 % (ref 11.5–14.5)
GLOBULIN SER CALC-MCNC: 2.9 G/DL (ref 2.3–3.5)
GLUCOSE SERPL-MCNC: 130 MG/DL (ref 70–99)
HCT VFR BLD AUTO: 44.1 % (ref 42–52)
HGB BLD-MCNC: 15.1 G/DL (ref 14–18)
MCH RBC QN AUTO: 29 PG (ref 27–31.3)
MCHC RBC AUTO-ENTMCNC: 34.2 % (ref 33–37)
MCV RBC AUTO: 84.6 FL (ref 79–92.2)
PLATELET # BLD AUTO: 240 K/UL (ref 130–400)
POTASSIUM SERPL-SCNC: 4.7 MEQ/L (ref 3.4–4.9)
PROT SERPL-MCNC: 7.3 G/DL (ref 6.3–8)
RBC # BLD AUTO: 5.21 M/UL (ref 4.7–6.1)
SODIUM SERPL-SCNC: 135 MEQ/L (ref 135–144)
WBC # BLD AUTO: 8 K/UL (ref 4.8–10.8)

## 2025-05-08 LAB
ESTIMATED AVERAGE GLUCOSE: 143 MG/DL
HBA1C MFR BLD: 6.6 % (ref 4–6)

## 2025-05-18 DIAGNOSIS — E11.9 TYPE 2 DIABETES MELLITUS WITHOUT COMPLICATION, WITH LONG-TERM CURRENT USE OF INSULIN (HCC): Primary | ICD-10-CM

## 2025-05-18 DIAGNOSIS — Z79.4 TYPE 2 DIABETES MELLITUS WITHOUT COMPLICATION, WITH LONG-TERM CURRENT USE OF INSULIN (HCC): Primary | ICD-10-CM

## 2025-05-19 RX ORDER — DULAGLUTIDE 4.5 MG/.5ML
INJECTION, SOLUTION SUBCUTANEOUS
Qty: 2 ML | Refills: 5 | Status: SHIPPED | OUTPATIENT
Start: 2025-05-19

## 2025-05-19 RX ORDER — ACYCLOVIR 400 MG/1
TABLET ORAL
Qty: 1 EACH | Refills: 0 | Status: SHIPPED | OUTPATIENT
Start: 2025-05-19

## 2025-06-02 RX ORDER — PANTOPRAZOLE SODIUM 40 MG/1
40 TABLET, DELAYED RELEASE ORAL
Qty: 90 TABLET | Refills: 3 | Status: SHIPPED | OUTPATIENT
Start: 2025-06-02

## 2025-06-04 ENCOUNTER — OFFICE VISIT (OUTPATIENT)
Age: 60
End: 2025-06-04
Payer: COMMERCIAL

## 2025-06-04 VITALS
RESPIRATION RATE: 16 BRPM | HEIGHT: 67 IN | WEIGHT: 156 LBS | HEART RATE: 89 BPM | BODY MASS INDEX: 24.48 KG/M2 | OXYGEN SATURATION: 96 % | DIASTOLIC BLOOD PRESSURE: 73 MMHG | SYSTOLIC BLOOD PRESSURE: 107 MMHG

## 2025-06-04 DIAGNOSIS — E11.65 TYPE 2 DIABETES MELLITUS WITH HYPERGLYCEMIA, UNSPECIFIED WHETHER LONG TERM INSULIN USE (HCC): Primary | ICD-10-CM

## 2025-06-04 LAB
CHP ED QC CHECK: NORMAL
GLUCOSE BLD-MCNC: 195 MG/DL

## 2025-06-04 PROCEDURE — 3017F COLORECTAL CA SCREEN DOC REV: CPT | Performed by: PHYSICIAN ASSISTANT

## 2025-06-04 PROCEDURE — G8428 CUR MEDS NOT DOCUMENT: HCPCS | Performed by: PHYSICIAN ASSISTANT

## 2025-06-04 PROCEDURE — G8420 CALC BMI NORM PARAMETERS: HCPCS | Performed by: PHYSICIAN ASSISTANT

## 2025-06-04 PROCEDURE — 2022F DILAT RTA XM EVC RTNOPTHY: CPT | Performed by: PHYSICIAN ASSISTANT

## 2025-06-04 PROCEDURE — 1036F TOBACCO NON-USER: CPT | Performed by: PHYSICIAN ASSISTANT

## 2025-06-04 PROCEDURE — 99214 OFFICE O/P EST MOD 30 MIN: CPT | Performed by: PHYSICIAN ASSISTANT

## 2025-06-04 PROCEDURE — 99213 OFFICE O/P EST LOW 20 MIN: CPT | Performed by: PHYSICIAN ASSISTANT

## 2025-06-04 PROCEDURE — 3044F HG A1C LEVEL LT 7.0%: CPT | Performed by: PHYSICIAN ASSISTANT

## 2025-06-04 PROCEDURE — PBSHW POCT GLUCOSE: Performed by: PHYSICIAN ASSISTANT

## 2025-06-04 PROCEDURE — 82962 GLUCOSE BLOOD TEST: CPT | Performed by: PHYSICIAN ASSISTANT

## 2025-06-04 ASSESSMENT — ENCOUNTER SYMPTOMS
RHINORRHEA: 0
SHORTNESS OF BREATH: 0
EYE PAIN: 0
COUGH: 0
ABDOMINAL PAIN: 0
SORE THROAT: 0
EYE REDNESS: 0
NAUSEA: 0
VOMITING: 0
DIARRHEA: 0
WHEEZING: 0
SINUS PRESSURE: 0

## 2025-06-04 NOTE — PROGRESS NOTES
Assessment:       Diagnosis Orders   1. Type 2 diabetes mellitus with hyperglycemia, unspecified whether long term insulin use (HCC)  POCT Glucose    Comprehensive Metabolic Panel    Hemoglobin A1C    HM DIABETES FOOT EXAM    Lipid Panel        Average blood glucose 100-140 without significant hyper or hypoglycemic events    PLAN:     Assessment & Plan  1. Type 2 Diabetes Mellitus.  - Average glucose level over the past two weeks is 170, corresponding to a GMI of 7.4%.  - A1c level as of 05/07/2025 was 6.6%.  - Advised to maintain a low-sugar, low-carbohydrate diet.  - Continue Trulicity 4.5 mg weekly, Jardiance 10 mg, and Metformin 500 mg XR twice a day. Use Humulin R pens as a backup if blood sugar spikes significantly. Encouraged to download the Dexcom rachelle on the new phone once obtained.    2. Leg Sores.  - Recurrent sores on legs, with one present for several months.  - No abscess detected; advised to apply Neosporin and cover the sores.  - Gently debride the area with warm soap and water to remove any scabs that may be inhibiting healing.  - Referral to [facility] for further evaluation and management.       Orders Placed This Encounter   Procedures    Comprehensive Metabolic Panel     Standing Status:   Future     Expected Date:   6/4/2025     Expiration Date:   6/4/2026    Hemoglobin A1C     Standing Status:   Future     Expected Date:   6/4/2025     Expiration Date:   6/4/2026    Lipid Panel     Standing Status:   Future     Expected Date:   6/4/2025     Expiration Date:   6/4/2026    POCT Glucose     DIABETES FOOT EXAM     No orders of the defined types were placed in this encounter.      No follow-ups on file.    Subjective:     Chief Complaint   Patient presents with    Diabetes     CGM Dexcom     Vitals:    06/04/25 1108   BP: 107/73   BP Site: Left Upper Arm   Patient Position: Sitting   BP Cuff Size: Medium Adult   Pulse: 89   Resp: 16   SpO2: 96%   Weight: 70.8 kg (156 lb)   Height: 1.702 m

## 2025-06-08 DIAGNOSIS — Z79.4 TYPE 2 DIABETES MELLITUS WITHOUT COMPLICATION, WITH LONG-TERM CURRENT USE OF INSULIN (HCC): ICD-10-CM

## 2025-06-08 DIAGNOSIS — E11.9 TYPE 2 DIABETES MELLITUS WITHOUT COMPLICATION, WITH LONG-TERM CURRENT USE OF INSULIN (HCC): ICD-10-CM

## 2025-06-09 ENCOUNTER — TELEPHONE (OUTPATIENT)
Age: 60
End: 2025-06-09

## 2025-06-09 DIAGNOSIS — L98.9 SKIN SORE: Primary | ICD-10-CM

## 2025-06-09 RX ORDER — OMEGA-3-ACID ETHYL ESTERS 1 G/1
2 CAPSULE, LIQUID FILLED ORAL 2 TIMES DAILY
Qty: 60 CAPSULE | Refills: 3 | Status: SHIPPED | OUTPATIENT
Start: 2025-06-09

## 2025-06-09 NOTE — TELEPHONE ENCOUNTER
Spouse called in (on HIPAA) requesting Dr. Zheng place a referral for the patient to see Dermatology.     They saw Dr. Mendel Cr and noticed that the patient has developed brown sore like spots on his arms and legs. Dr. Cr suggested patient see Dermatology.    Please advise.    AYE 04/29/2025

## 2025-06-10 RX ORDER — METFORMIN HYDROCHLORIDE 500 MG/1
500 TABLET, EXTENDED RELEASE ORAL 2 TIMES DAILY
Qty: 60 TABLET | Refills: 5 | Status: SHIPPED | OUTPATIENT
Start: 2025-06-10

## 2025-06-10 RX ORDER — ACYCLOVIR 400 MG/1
TABLET ORAL
Qty: 1 EACH | Refills: 0 | Status: SHIPPED | OUTPATIENT
Start: 2025-06-10

## 2025-06-16 RX ORDER — DULAGLUTIDE 4.5 MG/.5ML
INJECTION, SOLUTION SUBCUTANEOUS
Qty: 2 ML | Refills: 5 | Status: ACTIVE | OUTPATIENT
Start: 2025-06-16

## 2025-07-17 DIAGNOSIS — E11.9 TYPE 2 DIABETES MELLITUS WITHOUT COMPLICATION, WITH LONG-TERM CURRENT USE OF INSULIN (HCC): Primary | ICD-10-CM

## 2025-07-17 DIAGNOSIS — Z79.4 TYPE 2 DIABETES MELLITUS WITHOUT COMPLICATION, WITH LONG-TERM CURRENT USE OF INSULIN (HCC): Primary | ICD-10-CM

## 2025-07-17 RX ORDER — DULAGLUTIDE 4.5 MG/.5ML
INJECTION, SOLUTION SUBCUTANEOUS
Qty: 2 ML | Refills: 5 | OUTPATIENT
Start: 2025-07-17

## 2025-07-18 RX ORDER — ATORVASTATIN CALCIUM 20 MG/1
20 TABLET, FILM COATED ORAL DAILY
Qty: 90 TABLET | Refills: 3 | Status: SHIPPED | OUTPATIENT
Start: 2025-07-18

## 2025-07-18 RX ORDER — ACYCLOVIR 400 MG/1
TABLET ORAL
Qty: 3 EACH | Refills: 5 | Status: SHIPPED | OUTPATIENT
Start: 2025-07-18

## 2025-08-26 DIAGNOSIS — Z79.4 TYPE 2 DIABETES MELLITUS WITHOUT COMPLICATION, WITH LONG-TERM CURRENT USE OF INSULIN (HCC): ICD-10-CM

## 2025-08-26 DIAGNOSIS — E11.9 TYPE 2 DIABETES MELLITUS WITHOUT COMPLICATION, WITH LONG-TERM CURRENT USE OF INSULIN (HCC): ICD-10-CM

## 2025-08-26 RX ORDER — OMEGA-3-ACID ETHYL ESTERS 1 G/1
2 CAPSULE, LIQUID FILLED ORAL 2 TIMES DAILY
Qty: 60 CAPSULE | Refills: 3 | Status: SHIPPED | OUTPATIENT
Start: 2025-08-26

## 2025-08-26 RX ORDER — GLUCOSAMINE HCL/CHONDROITIN SU 500-400 MG
1 CAPSULE ORAL
Qty: 150 STRIP | Refills: 3 | Status: SHIPPED | OUTPATIENT
Start: 2025-08-26

## 2025-08-26 RX ORDER — ACYCLOVIR 400 MG/1
TABLET ORAL
Qty: 1 EACH | Refills: 0 | Status: SHIPPED | OUTPATIENT
Start: 2025-08-26

## 2025-08-27 RX ORDER — DULAGLUTIDE 4.5 MG/.5ML
INJECTION, SOLUTION SUBCUTANEOUS
Qty: 2 ML | Refills: 5 | Status: ACTIVE | OUTPATIENT
Start: 2025-08-27

## (undated) DEVICE — GOWN,AURORA,NONREINFORCED,LARGE: Brand: MEDLINE

## (undated) DEVICE — Device

## (undated) DEVICE — STERILE LATEX POWDER-FREE SURGICAL GLOVESWITH NITRILE COATING: Brand: PROTEXIS

## (undated) DEVICE — TUBE SET 96 MM 64 MM H2O PERISTALTIC STD AUX CHANNEL

## (undated) DEVICE — DBD-PACK,CYSTOSCOPY,PK VI,AURORA: Brand: MEDLINE

## (undated) DEVICE — EVACUATOR URO BLDR W/ ADPT UROVAC

## (undated) DEVICE — LABEL MED MINI W/ MARKER

## (undated) DEVICE — TRAY PREP DRY W/ PREM GLV 2 APPL 6 SPNG 2 UNDPD 1 OVERWRAP

## (undated) DEVICE — TUOHY-BORST SIDE-ARM ADAPTER: Brand: COOK

## (undated) DEVICE — BAG DRAINAGE URIN LIGEMAN W/ ADPT SUCTION HOSE CYSTO URO

## (undated) DEVICE — CYSTO/BLADDER IRRIGATION SET, REGULATING CLAMP

## (undated) DEVICE — SMARTGOWN BREATHABLE SPECIALTY GOWN: Brand: CONVERTORS

## (undated) DEVICE — BRUSH ENDO CLN L90.5IN SHTH DIA1.7MM BRIST DIA5-7MM 2-6MM

## (undated) DEVICE — HOOKED-PRONG GRASPING FORCEPS: Brand: TRICEP

## (undated) DEVICE — TOWEL,OR,DSP,ST,BLUE,STD,4/PK,20PK/CS: Brand: MEDLINE

## (undated) DEVICE — FOLLOWER URETH COUDE TIP 20 FRX13.35 IN DIL CATH HEYMAN

## (undated) DEVICE — DILATOR SURG URET 16FR

## (undated) DEVICE — SINGLE PORT MANIFOLD: Brand: NEPTUNE 2

## (undated) DEVICE — SINGLE ACTION PUMPING SYSTEM

## (undated) DEVICE — ANGLED TIP URETERAL CATHETER WITH BENTSON PTFE WIRE GUIDE: Brand: ANGLED TIP

## (undated) DEVICE — COVER FT SWCH W15XL17IN GRY ALL OEC SYS

## (undated) DEVICE — CONMED SCOPE SAVER BITE BLOCK, 20X27 MM: Brand: SCOPE SAVER

## (undated) DEVICE — BAG DRNGE 2000ML UROLOGY ANTI REFLX CHMBR SAMP PRT

## (undated) DEVICE — FOLLOWER URETH 16FR 339CM COUDE TIP THRD PLAS ST HEYMAN

## (undated) DEVICE — TUBING IRRIGATION 140/160/180/190 SER GI ENDOSCP SMARTCAP

## (undated) DEVICE — GUIDEWIRE ENDOSCP L150CM DIA0.038IN TIP L3CM STD PTFE ANG

## (undated) DEVICE — CATHETER URETH 16FR BLLN 5CC STD LTX 2 W F TWO OPP DRNGE

## (undated) DEVICE — ENDO CARRY-ON PROCEDURE KIT: Brand: ENDO CARRY-ON PROCEDURE KIT

## (undated) DEVICE — GLOVE ORANGE PI 8   MSG9080

## (undated) DEVICE — SYRINGE MED 10ML LUERLOCK TIP W/O SFTY DISP

## (undated) DEVICE — TRAY CATH CATH OD16FR BG F HYDROCOATED

## (undated) DEVICE — MULTI-WIRE STONE SWEEPING DEVICE: Brand: LESLIE PARACHUTE

## (undated) DEVICE — GOWN,PREVENTION PLUS,XLN/XL,ST,24/CS: Brand: MEDLINE

## (undated) DEVICE — SLEEVE CMPR SM STD CALF SCD ANEMB LF

## (undated) DEVICE — CATHETER ANGIO 18FR L4CM RBP 20ATM HYDR+ ULT HI PRSS SHT

## (undated) DEVICE — SONY PRINTER PAPER

## (undated) DEVICE — TUBING, SUCTION, 1/4" X 10', STRAIGHT: Brand: MEDLINE

## (undated) DEVICE — BALLOON DILATATION CATHETER KIT: Brand: UROMAX ULTRA KIT

## (undated) DEVICE — Z CONVERTED USE 2271043 CONTAINER SPEC COLL 4OZ SCR ON LID PEEL PCH

## (undated) DEVICE — DEVICE TORQ VISE STEER HNDL FOR GWIRE

## (undated) DEVICE — FORCEPS BX L240CM JAW DIA2.8MM L CAP W/ NDL MIC MESH TOOTH

## (undated) DEVICE — DISCONTINUED USE 393278 SYRINGE 10 ML HYPO W/O NDL LL TP PLSTC ST